# Patient Record
Sex: FEMALE | Race: BLACK OR AFRICAN AMERICAN | NOT HISPANIC OR LATINO | Employment: OTHER | URBAN - METROPOLITAN AREA
[De-identification: names, ages, dates, MRNs, and addresses within clinical notes are randomized per-mention and may not be internally consistent; named-entity substitution may affect disease eponyms.]

---

## 2017-01-03 ENCOUNTER — OFFICE VISIT (OUTPATIENT)
Dept: INTERNAL MEDICINE | Facility: CLINIC | Age: 52
End: 2017-01-03
Payer: MEDICARE

## 2017-01-03 VITALS
DIASTOLIC BLOOD PRESSURE: 80 MMHG | HEART RATE: 89 BPM | HEIGHT: 60 IN | SYSTOLIC BLOOD PRESSURE: 138 MMHG | BODY MASS INDEX: 50.21 KG/M2 | OXYGEN SATURATION: 100 % | WEIGHT: 255.75 LBS

## 2017-01-03 DIAGNOSIS — J44.89 CHRONIC OBSTRUCTIVE ASTHMA: ICD-10-CM

## 2017-01-03 DIAGNOSIS — E11.69 HYPERLIPIDEMIA ASSOCIATED WITH TYPE 2 DIABETES MELLITUS: ICD-10-CM

## 2017-01-03 DIAGNOSIS — E66.01 MORBID OBESITY DUE TO EXCESS CALORIES: ICD-10-CM

## 2017-01-03 DIAGNOSIS — I15.2 HYPERTENSION ASSOCIATED WITH DIABETES: ICD-10-CM

## 2017-01-03 DIAGNOSIS — E78.5 HYPERLIPIDEMIA ASSOCIATED WITH TYPE 2 DIABETES MELLITUS: ICD-10-CM

## 2017-01-03 DIAGNOSIS — E11.42 DIABETIC POLYNEUROPATHY ASSOCIATED WITH TYPE 2 DIABETES MELLITUS: ICD-10-CM

## 2017-01-03 DIAGNOSIS — E11.8 TYPE 2 DIABETES MELLITUS WITH COMPLICATION, WITHOUT LONG-TERM CURRENT USE OF INSULIN: ICD-10-CM

## 2017-01-03 DIAGNOSIS — E83.59 OTHER DISORDERS OF CALCIUM METABOLISM: ICD-10-CM

## 2017-01-03 DIAGNOSIS — E11.59 HYPERTENSION ASSOCIATED WITH DIABETES: ICD-10-CM

## 2017-01-03 PROBLEM — E11.9 TYPE 2 DIABETES MELLITUS, WITHOUT LONG-TERM CURRENT USE OF INSULIN: Status: ACTIVE | Noted: 2017-01-03

## 2017-01-03 PROCEDURE — 99499 UNLISTED E&M SERVICE: CPT | Mod: S$GLB,,, | Performed by: INTERNAL MEDICINE

## 2017-01-03 PROCEDURE — 1159F MED LIST DOCD IN RCRD: CPT | Mod: S$GLB,,, | Performed by: INTERNAL MEDICINE

## 2017-01-03 PROCEDURE — 2022F DILAT RTA XM EVC RTNOPTHY: CPT | Mod: S$GLB,,, | Performed by: INTERNAL MEDICINE

## 2017-01-03 PROCEDURE — 3079F DIAST BP 80-89 MM HG: CPT | Mod: S$GLB,,, | Performed by: INTERNAL MEDICINE

## 2017-01-03 PROCEDURE — 99999 PR PBB SHADOW E&M-EST. PATIENT-LVL III: CPT | Mod: PBBFAC,,, | Performed by: INTERNAL MEDICINE

## 2017-01-03 PROCEDURE — 4010F ACE/ARB THERAPY RXD/TAKEN: CPT | Mod: S$GLB,,, | Performed by: INTERNAL MEDICINE

## 2017-01-03 PROCEDURE — 99204 OFFICE O/P NEW MOD 45 MIN: CPT | Mod: S$GLB,,, | Performed by: INTERNAL MEDICINE

## 2017-01-03 PROCEDURE — 3046F HEMOGLOBIN A1C LEVEL >9.0%: CPT | Mod: S$GLB,,, | Performed by: INTERNAL MEDICINE

## 2017-01-03 PROCEDURE — 3075F SYST BP GE 130 - 139MM HG: CPT | Mod: S$GLB,,, | Performed by: INTERNAL MEDICINE

## 2017-01-03 NOTE — PROGRESS NOTES
Primary Care Provider Appointment    Subjective:      Patient ID: Dai Roper is a 51 y.o. female.    Chief Complaint: Annual Exam; Medication Refill; and Diabetes  Seen at [facility] for ED visits and podiatry appointments. Seen in [facility] ED for asthma. Also frequents [facility] and [facility] EDs. Usually evaluated for asthma exacerbations. Previously followed at [facility], but has not been seen in 1 year.  Wants weight loss surgery.    Asthma is intermittently controlled, patient unaware of PFTs. Diabetes is uncontrolled (per patient) due to asthma meds and steroids. She has been dieting for 1 week.      Social History  Components    Tobacco (-)     Alcohol (-) rare    Ililcit drugs (-)    Sex (-) , previous male partner    Employment (-) Disability for diabetes and asthma       Past Surgical History   Procedure Laterality Date    Hernia repair      Carpal tunnel release         Past Medical History   Diagnosis Date    Asthma     Diabetes mellitus     Hypertension        Family History  Diagnosis    Hypertension- first degree relatives    No diabetes       Review of Systems   Constitutional: Positive for fatigue. Negative for activity change and unexpected weight change.   HENT: Negative for ear discharge.    Eyes: Negative for photophobia and discharge.   Respiratory: Negative for cough, choking and shortness of breath.    Cardiovascular: Negative for chest pain, palpitations and leg swelling.   Gastrointestinal: Negative for constipation and diarrhea.   Endocrine: Negative for polydipsia and polyphagia.   Genitourinary: Negative for frequency and menstrual problem.   Musculoskeletal: Negative for back pain and gait problem.   Skin: Negative for color change.   Neurological: Negative for light-headedness.   Psychiatric/Behavioral: Positive for dysphoric mood and sleep disturbance. Negative for confusion.       Objective:     Visit Vitals    /80    Pulse 89    Ht 5' (1.524 m)    Wt 116 kg (255 lb 11.7  oz)    LMP 02/05/2013    SpO2 100%    BMI 49.94 kg/m2       Physical Exam   Constitutional: She is oriented to person, place, and time. She appears well-developed and well-nourished.   Morbidly obese   HENT:   Head: Normocephalic and atraumatic.   mulitple extracted teeth  Dental caries   Eyes: EOM are normal. Pupils are equal, round, and reactive to light. Right eye exhibits no discharge. Left eye exhibits no discharge.   Neck: Normal range of motion.   Cardiovascular: Normal rate and regular rhythm.  Exam reveals no friction rub.    No murmur heard.  Pulmonary/Chest: Effort normal and breath sounds normal. No respiratory distress. She has no wheezes.   Abdominal: Soft. She exhibits no distension. There is no tenderness.   Obese abdomen   Musculoskeletal: Normal range of motion. She exhibits no tenderness.   Lymphadenopathy:     She has no cervical adenopathy.   Neurological: She is alert and oriented to person, place, and time. She exhibits normal muscle tone. Coordination normal.   Skin: Skin is warm. No erythema.   Psychiatric: She has a normal mood and affect. Her behavior is normal.   Poor eye contact   Vitals reviewed.          Lab Results   Component Value Date    WBC 5.81 01/03/2017    HGB 12.2 01/03/2017    HCT 36.6 (L) 01/03/2017     01/03/2017    ALT 12 07/13/2016    AST 15 07/13/2016     07/13/2016    K 4.0 07/13/2016     07/13/2016    CREATININE 0.7 07/13/2016    BUN 8 07/13/2016    CO2 27 07/13/2016    INR 1.0 09/07/2011       Current Outpatient Prescriptions on File Prior to Visit   Medication Sig Dispense Refill    albuterol (ACCUNEB) 1.25 mg/3 mL Nebu Take 1.25 mg by nebulization every 6 (six) hours as needed.      chlorhexidine (PERIDEX) 0.12 % solution       fluticasone (FLOVENT HFA) 220 mcg/actuation inhaler Inhale 1 puff into the lungs 2 (two) times daily.      hydrocodone-acetaminophen 5-325mg (NORCO) 5-325 mg per tablet       lisinopril (PRINIVIL,ZESTRIL) 20 MG  tablet Take 20 mg by mouth once daily.      metformin (GLUCOPHAGE) 500 MG tablet Take 500 mg by mouth 2 (two) times daily with meals.      methylPREDNISolone (MEDROL DOSEPACK) 4 mg tablet Pack as directed 1 Package 0    ondansetron (ZOFRAN-ODT) 8 MG TbDL Take 1 tablet (8 mg total) by mouth every 8 (eight) hours as needed (for nausea or vomiting). 30 tablet 0    penicillin v potassium (VEETID) 500 MG tablet Take 500 mg by mouth 4 (four) times daily.      tramadol (ULTRAM) 50 mg tablet Take 50 mg by mouth every 6 (six) hours as needed for Pain.      econazole nitrate 1 % cream Apply topically 2 (two) times daily. 85 g 1     No current facility-administered medications on file prior to visit.          Assessment:   51 y.o. female with multiple co-morbid illnesses here to establish care with new PCP and continue work-up of chronic issues notably obesity, DM, asthma.     Plan:     Problem List Items Addressed This Visit        Neuro    Diabetic polyneuropathy associated with type 2 diabetes mellitus     Patient with uncontrolled DM, morbid obesity  · Labs  · DM foot exam at next appt         Relevant Orders    Vitamin D       Pulmonary    Chronic obstructive asthma     Patient with no smoking history, unaware of pulm work-up. Questionable compliance with PETERSON, inhaled steroids. Differential includes asthma vs OHS  · PFT ordered  · Will refer to Pulm (Dr Lopes) once PFT results  · Continue PETERSON, inhaled corticosteroids         Relevant Orders    Complete PFT w/ bronchodilator       Cardiac    Hypertension associated with diabetes     Uncontrolled DM, morbid obesity  · Advised to lose weight  · Health  consult         Relevant Orders    CBC auto differential (Completed)    Comprehensive metabolic panel    Hemoglobin A1c    TSH    Microalbumin/creatinine urine ratio       Endocrine    Type 2 diabetes mellitus with complication, without long-term current use of insulin     Taking oral anti-glycemics, poor  control  · Check A1c today  · Advised to cut back on high-glycemic foods  · Health  consult         Relevant Orders    Comprehensive metabolic panel    Hemoglobin A1c    TSH    Microalbumin/creatinine urine ratio       Fluids/Electrolytes/Nutrition/GI    Morbid obesity due to excess calories     BMI 49, patient with DM, HTN. Patient wanting   · advsied to lose weight  · Decrease trans fats, decrease caloric intake  · Referred to health   · Will consider weight surgery in the future         Relevant Orders    CBC auto differential (Completed)    Hepatitis C antibody    Hyperlipidemia associated with type 2 diabetes mellitus     Patient with uncontrolled DM, morbid obesity, HLD  · Check lipid panel, A1c  · Advised to decrease trans fats  · Continue statin         Relevant Orders    Comprehensive metabolic panel    Hemoglobin A1c    TSH    Microalbumin/creatinine urine ratio    Lipid panel      Other Visit Diagnoses     Other disorders of calcium metabolism         Relevant Orders    Vitamin D          Health Maintenance       Date Due Completion Date    Hepatitis C Screening 1965 ---NOW    Lipid Panel 1965 ---NOW    TETANUS VACCINE 6/30/1981 ---    Pap Smear 6/30/1984 ---    Mammogram 6/30/2003 ---    Colonoscopy 6/30/2013 ---    Influenza Vaccine 8/1/2016 --- TODAY          Return in about 2 days (around 1/5/2017) for review of labs, preventive screening follow-up.    Sumi Corrigan MD/MPH  Internal Medicine  [facility]  [phone]

## 2017-01-03 NOTE — ASSESSMENT & PLAN NOTE
BMI 49, patient with DM, HTN. Patient wanting   · advsied to lose weight  · Decrease trans fats, decrease caloric intake  · Referred to health   · Will consider weight surgery in the future

## 2017-01-03 NOTE — ASSESSMENT & PLAN NOTE
Patient with uncontrolled DM, morbid obesity, HLD  · Check lipid panel, A1c  · Advised to decrease trans fats  · Continue statin

## 2017-01-03 NOTE — ASSESSMENT & PLAN NOTE
Patient with no smoking history, unaware of pulm work-up. Questionable compliance with PETERSON, inhaled steroids. Differential includes asthma vs OHS  · PFT ordered  · Will refer to Pulm (Dr Lopes) once PFT results  · Continue PETERSON, inhaled corticosteroids

## 2017-01-03 NOTE — MR AVS SNAPSHOT
[facility] - Internal Medicine  [address]  [place] [address]  Phone: [phone]  Fax: [phone]                  Dai Roper   1/3/2017 9:00 AM   Office Visit    Description:  Female : 1965   Provider:  Sumi Corrigan MD   Department:  [facility] - Internal Medicine           Reason for Visit     Annual Exam     Medication Refill     Diabetes           Diagnoses this Visit        Comments    Morbid obesity due to excess calories         Chronic obstructive asthma         Type 2 diabetes mellitus with complication, without long-term current use of insulin         Hyperlipidemia associated with type 2 diabetes mellitus         Hypertension associated with diabetes         Diabetic polyneuropathy associated with type 2 diabetes mellitus         Other disorders of calcium metabolism                To Do List           Future Appointments        Provider Department Dept Phone    1/3/2017 10:30 AM LAB, APPOINTMENT [facility] [phone]    2017 10:30 AM Sumi Corrigan MD [facility] [phone]      Goals (5 Years of Data)     None      Follow-Up and Disposition     Return in about 2 days (around 2017) for review of labs, preventive screening follow-up.      [facility]     [facility] Care Line -  Assistance  Registered nurses in the [facility] provide clinical advisement, health education, appointment booking, and other advisory services.  Call for this free service at [phone].             Medications           Message regarding Medications     Verify the changes and/or additions to your medication regime listed below are the same as discussed with your clinician today.  If any of these changes or additions are incorrect, please notify your healthcare provider.             Verify that the below list of medications is an accurate representation of the medications you are currently  taking.  If none reported, the list may be blank. If incorrect, please contact your healthcare provider. Carry this list with you in case of emergency.           Current Medications     albuterol (ACCUNEB) 1.25 mg/3 mL Nebu Take 1.25 mg by nebulization every 6 (six) hours as needed.    chlorhexidine (PERIDEX) 0.12 % solution     fluticasone (FLOVENT HFA) 220 mcg/actuation inhaler Inhale 1 puff into the lungs 2 (two) times daily.    hydrocodone-acetaminophen 5-325mg (NORCO) 5-325 mg per tablet     lisinopril (PRINIVIL,ZESTRIL) 20 MG tablet Take 20 mg by mouth once daily.    metformin (GLUCOPHAGE) 500 MG tablet Take 500 mg by mouth 2 (two) times daily with meals.    methylPREDNISolone (MEDROL DOSEPACK) 4 mg tablet Pack as directed    ondansetron (ZOFRAN-ODT) 8 MG TbDL Take 1 tablet (8 mg total) by mouth every 8 (eight) hours as needed (for nausea or vomiting).    penicillin v potassium (VEETID) 500 MG tablet Take 500 mg by mouth 4 (four) times daily.    tramadol (ULTRAM) 50 mg tablet Take 50 mg by mouth every 6 (six) hours as needed for Pain.    econazole nitrate 1 % cream Apply topically 2 (two) times daily.           Clinical Reference Information           Vital Signs - Last Recorded  Most recent update: 1/3/2017  9:47 AM by Waleska Briones MA    BP Pulse Ht Wt LMP SpO2    138/80 89 5' (1.524 m) 116 kg (255 lb 11.7 oz) 02/05/2013 100%    BMI                49.94 kg/m2          Blood Pressure          Most Recent Value    BP  138/80      Allergies as of 1/3/2017     No Known Allergies      Immunizations Administered on Date of Encounter - 1/3/2017     None      Orders Placed During Today's Visit      Normal Orders This Visit    Microalbumin/creatinine urine ratio     Future Labs/Procedures Expected by Expires    CBC auto differential  1/3/2017 3/4/2018    Comprehensive metabolic panel  1/3/2017 3/4/2018    Hemoglobin A1c  1/3/2017 3/4/2018    Hepatitis C antibody  1/3/2017 3/4/2018    Lipid panel  1/3/2017 3/4/2018     TSH  1/3/2017 3/4/2018    Vitamin D  1/3/2017 3/4/2018    Complete PFT w/ bronchodilator  As directed 1/3/2018      [facility] Sign-Up     Activating your [facility] account is as easy as 1-2-3!     1) Visit [email or web address], select Sign Up Now, enter this activation code and your date of birth, then select Next.  [email or web address]  Expires: 2/17/2017 10:21 AM      2) Create a username and password to use when you visit [facility] in the future and select a security question in case you lose your password and select Next.    3) Enter your e-mail address and click Sign Up!    Additional Information  If you have questions, please e-mail [email or web address] or call [phone] to talk to our [facility] staff. Remember, [facility] is NOT to be used for urgent needs. For medical emergencies, dial 911.

## 2017-01-05 ENCOUNTER — OFFICE VISIT (OUTPATIENT)
Dept: INTERNAL MEDICINE | Facility: CLINIC | Age: 52
End: 2017-01-05
Payer: MEDICARE

## 2017-01-05 VITALS
TEMPERATURE: 98 F | SYSTOLIC BLOOD PRESSURE: 120 MMHG | HEIGHT: 62 IN | RESPIRATION RATE: 20 BRPM | DIASTOLIC BLOOD PRESSURE: 88 MMHG | WEIGHT: 252.63 LBS | OXYGEN SATURATION: 99 % | HEART RATE: 90 BPM | BODY MASS INDEX: 46.49 KG/M2

## 2017-01-05 DIAGNOSIS — E11.42 DIABETIC POLYNEUROPATHY ASSOCIATED WITH TYPE 2 DIABETES MELLITUS: ICD-10-CM

## 2017-01-05 DIAGNOSIS — J44.89 CHRONIC OBSTRUCTIVE ASTHMA: ICD-10-CM

## 2017-01-05 DIAGNOSIS — E11.8 TYPE 2 DIABETES MELLITUS WITH COMPLICATION, WITHOUT LONG-TERM CURRENT USE OF INSULIN: ICD-10-CM

## 2017-01-05 DIAGNOSIS — M79.89 PAIN AND SWELLING OF LEFT LOWER LEG: ICD-10-CM

## 2017-01-05 DIAGNOSIS — E66.01 MORBID OBESITY DUE TO EXCESS CALORIES: ICD-10-CM

## 2017-01-05 DIAGNOSIS — M79.662 PAIN AND SWELLING OF LEFT LOWER LEG: ICD-10-CM

## 2017-01-05 PROCEDURE — 3044F HG A1C LEVEL LT 7.0%: CPT | Mod: S$GLB,,, | Performed by: INTERNAL MEDICINE

## 2017-01-05 PROCEDURE — 99214 OFFICE O/P EST MOD 30 MIN: CPT | Mod: 25,S$GLB,, | Performed by: INTERNAL MEDICINE

## 2017-01-05 PROCEDURE — 99999 PR PBB SHADOW E&M-EST. PATIENT-LVL III: CPT | Mod: PBBFAC,,, | Performed by: INTERNAL MEDICINE

## 2017-01-05 PROCEDURE — 90688 IIV4 VACCINE SPLT 0.5 ML IM: CPT | Mod: S$GLB,,, | Performed by: INTERNAL MEDICINE

## 2017-01-05 PROCEDURE — 3079F DIAST BP 80-89 MM HG: CPT | Mod: S$GLB,,, | Performed by: INTERNAL MEDICINE

## 2017-01-05 PROCEDURE — 99499 UNLISTED E&M SERVICE: CPT | Mod: S$GLB,,, | Performed by: INTERNAL MEDICINE

## 2017-01-05 PROCEDURE — 4010F ACE/ARB THERAPY RXD/TAKEN: CPT | Mod: S$GLB,,, | Performed by: INTERNAL MEDICINE

## 2017-01-05 PROCEDURE — 3074F SYST BP LT 130 MM HG: CPT | Mod: S$GLB,,, | Performed by: INTERNAL MEDICINE

## 2017-01-05 PROCEDURE — G0008 ADMIN INFLUENZA VIRUS VAC: HCPCS | Mod: S$GLB,,, | Performed by: INTERNAL MEDICINE

## 2017-01-05 PROCEDURE — 1159F MED LIST DOCD IN RCRD: CPT | Mod: S$GLB,,, | Performed by: INTERNAL MEDICINE

## 2017-01-05 PROCEDURE — 2022F DILAT RTA XM EVC RTNOPTHY: CPT | Mod: S$GLB,,, | Performed by: INTERNAL MEDICINE

## 2017-01-05 RX ORDER — SIMVASTATIN 40 MG/1
40 TABLET, FILM COATED ORAL NIGHTLY
Qty: 90 TABLET | Refills: 3 | Status: SHIPPED | OUTPATIENT
Start: 2017-01-05 | End: 2017-02-22 | Stop reason: SDUPTHER

## 2017-01-05 RX ORDER — SIMVASTATIN 40 MG/1
40 TABLET, FILM COATED ORAL
COMMUNITY
Start: 2016-07-13 | End: 2017-01-05 | Stop reason: SDUPTHER

## 2017-01-05 RX ORDER — LISINOPRIL AND HYDROCHLOROTHIAZIDE 12.5; 2 MG/1; MG/1
1 TABLET ORAL DAILY
Qty: 90 TABLET | Refills: 3 | Status: SHIPPED | OUTPATIENT
Start: 2017-01-05 | End: 2017-02-22 | Stop reason: SDUPTHER

## 2017-01-05 RX ORDER — HYDROCHLOROTHIAZIDE 12.5 MG/1
12.5 CAPSULE ORAL
COMMUNITY
Start: 2016-07-13 | End: 2017-01-05 | Stop reason: SDUPTHER

## 2017-01-05 RX ORDER — ALBUTEROL SULFATE 1.25 MG/3ML
1.25 SOLUTION RESPIRATORY (INHALATION) EVERY 6 HOURS PRN
Qty: 100 VIAL | Refills: 3 | Status: SHIPPED | OUTPATIENT
Start: 2017-01-05 | End: 2018-04-28 | Stop reason: ALTCHOICE

## 2017-01-05 RX ORDER — GABAPENTIN 300 MG/1
300 CAPSULE ORAL
COMMUNITY
Start: 2016-07-13 | End: 2017-01-05 | Stop reason: SDUPTHER

## 2017-01-05 RX ORDER — GABAPENTIN 300 MG/1
300 CAPSULE ORAL 3 TIMES DAILY
Qty: 90 CAPSULE | Refills: 3 | Status: SHIPPED | OUTPATIENT
Start: 2017-01-05 | End: 2017-11-06 | Stop reason: SDUPTHER

## 2017-01-05 RX ORDER — METFORMIN HYDROCHLORIDE 500 MG/1
500 TABLET ORAL
Qty: 90 TABLET | Refills: 3 | Status: SHIPPED | OUTPATIENT
Start: 2017-01-05 | End: 2017-05-11 | Stop reason: DRUGHIGH

## 2017-01-05 NOTE — ASSESSMENT & PLAN NOTE
Taking oral anti-glycemics, poor control. A1c 6.0 on 1/2017  · Advised to cut back on high-glycemic foods  · Health  consult  · Restart metformin 500mg once daily

## 2017-01-05 NOTE — ASSESSMENT & PLAN NOTE
Patient with uncontrolled DM, morbid obesity. 5/10 abnormal sensation in R foot, 7/10 abnormal in L foot  · A1c 6  · DM foot exam today abnormal  · Continue gabapentin  · F/U podiatry  · Nail debridement

## 2017-01-05 NOTE — ASSESSMENT & PLAN NOTE
"Wells score of 0, chronic pain/swelling since 2011 (with neg DV US). Differential: DM neuropathy, chronic DVT, venous insufficiency, venous stasis  · US of LE to eval for DVT  · Start compression stockings (15.5" each calf) if neg US  · Wean off narcotics  · Tramadol, gabapentin sparingly  · Start PT if neg US  "

## 2017-01-05 NOTE — PATIENT INSTRUCTIONS
TODAY:  - PFT scheduled  - Health  consult  - US of legs  - echocardiogram  - can order compression stockings if LE US normal   - retina camera screening  - flu vaccine    NEXT APPT:  - Physical therapy  - mammogram

## 2017-01-05 NOTE — ASSESSMENT & PLAN NOTE
BMI 49, patient with DM, HTN. Patient wanting   · advsied to lose weight  · Decrease trans fats, decrease caloric intake  · Referred to health  today  · Will consider weight surgery in the future

## 2017-01-05 NOTE — PROGRESS NOTES
Primary Care Provider Appointment    Subjective:      Patient ID: Dai Roper is a 51 y.o. female.    Chief Complaint: Follow-up (2 day follow up )  Patient here today for labs follow-up. Previously took metformin 1g BID, but it made her nauseas. She takes it then spends all day in bed. For the past week she has not taken anything. She reports compliance with simvastatin, lisinopril.    Complains of leg pain from calfs to thighs, worse at night. She elevates the legs to relieve the pain, walking does not affect it. She experiences swelling with the L leg at the end of the day. It is debilitating 1-2 times weekly. She takes 1 norco daily, tramadol twice daily, takes aleve twice daily as needed. She has never done PT or worn compression stockings daily.    Patient continues to be morbidly obese with multiple health complications. Has some insight into her condition, but 7 pound weight gain over the last year.      Social History  Components      Tobacco (-)     Alcohol (-) rare    Ililcit drugs (-)    Sex (-) , previous male partner    Employment (-) Disability for diabetes and asthma         Past Surgical History   Procedure Laterality Date    Hernia repair      Carpal tunnel release         Past Medical History   Diagnosis Date    Asthma     Diabetes mellitus     Hypertension        Review of Systems   Constitutional: Positive for fatigue. Negative for activity change and unexpected weight change.   HENT: Negative for ear discharge.    Eyes: Negative for photophobia and discharge.   Respiratory: Negative for cough, choking and shortness of breath.    Cardiovascular: Positive for leg swelling. Negative for chest pain and palpitations.   Gastrointestinal: Negative for constipation and diarrhea.   Endocrine: Negative for polydipsia and polyphagia.   Genitourinary: Negative for frequency and menstrual problem.   Musculoskeletal: Positive for joint swelling. Negative for back pain and gait problem.  "       Leg pain   Skin: Negative for color change.   Neurological: Negative for light-headedness.   Psychiatric/Behavioral: Positive for dysphoric mood and sleep disturbance. Negative for confusion.       Objective:     Visit Vitals    /88 (BP Location: Right arm, Patient Position: Sitting, BP Method: Manual)    Pulse 90    Temp 98 °F (36.7 °C)    Resp 20    Ht 5' 2" (1.575 m)    Wt 114.6 kg (252 lb 10.4 oz)    LMP 02/05/2013    SpO2 99%    BMI 46.21 kg/m2       Physical Exam   Constitutional: She appears well-developed and well-nourished.   HENT:   Head: Normocephalic and atraumatic.   Eyes: Pupils are equal, round, and reactive to light. Right eye exhibits no discharge. Left eye exhibits no discharge.   Neck: Normal range of motion.   Cardiovascular: Normal rate and regular rhythm.    Pulses:       Dorsalis pedis pulses are 1+ on the right side, and 1+ on the left side.        Posterior tibial pulses are 1+ on the right side, and 1+ on the left side.   mulitple varicose veins in LE  1+ pulses in LE   Pulmonary/Chest: No respiratory distress. She has no wheezes.   Abdominal: Soft. She exhibits no distension. There is no tenderness.   Musculoskeletal: She exhibits edema. She exhibits no tenderness.        Right foot: There is decreased range of motion. There is no deformity.        Left foot: There is decreased range of motion.   Nonpitting edema in LE, notable swelling  TTP bilaterally of legs   Feet:   Right Foot:   Protective Sensation: 10 sites tested. 5 sites sensed.   Skin Integrity: Positive for callus. Negative for ulcer or dry skin.   Left Foot:   Protective Sensation: 10 sites tested. 7 sites sensed.   Skin Integrity: Positive for callus. Negative for ulcer or dry skin.   Lymphadenopathy:     She has no cervical adenopathy.   Neurological: No cranial nerve deficit.   Skin: No erythema.   Psychiatric: She has a normal mood and affect. Her behavior is normal.   Vitals reviewed.      Lab " "Results   Component Value Date    WBC 5.81 01/03/2017    HGB 12.2 01/03/2017    HCT 36.6 (L) 01/03/2017     01/03/2017    CHOL 274 (H) 01/03/2017    TRIG 62 01/03/2017    HDL 67 01/03/2017    ALT 13 01/03/2017    AST 19 01/03/2017     01/03/2017    K 4.4 01/03/2017     01/03/2017    CREATININE 0.8 01/03/2017    BUN 11 01/03/2017    CO2 26 01/03/2017    TSH 1.261 01/03/2017    INR 1.0 09/07/2011    HGBA1C 6.0 01/03/2017         Assessment:   51 y.o. female with multiple co-morbid illnesses here to establish care with new PCP and continue work-up of chronic issues notably DM, leg pain, depression.     Plan:     Problem List Items Addressed This Visit        Neuro    Diabetic polyneuropathy associated with type 2 diabetes mellitus     Patient with uncontrolled DM, morbid obesity. 5/10 abnormal sensation in R foot, 7/10 abnormal in L foot  · A1c 6  · DM foot exam today abnormal  · Continue gabapentin  · F/U podiatry  · Nail debridement         Relevant Orders    Diabetic Eye Screening Photo    Pain and swelling of left lower leg     Wells score of 0, chronic pain/swelling since 2011 (with neg DV US). Differential: DM neuropathy, chronic DVT, venous insufficiency, venous stasis  · US of LE to eval for DVT  · Start compression stockings (15.5" each calf) if neg US  · Wean off narcotics  · Tramadol, gabapentin sparingly  · Start PT if neg US         Relevant Orders    US Lower Extremity Veins Bilateral    2D Echo w/ Color Flow Doppler       Pulmonary    Chronic obstructive asthma     Patient with no smoking history, unaware of pulm work-up. Questionable compliance with PETERSON, inhaled steroids. Differential includes asthma vs OHS  · PFT ordered  · Will refer to Pulm (Dr Lopes) once PFT results  · Continue PETERSON, inhaled corticosteroids            Endocrine    Type 2 diabetes mellitus with complication, without long-term current use of insulin     Taking oral anti-glycemics, poor control. A1c 6.0 on " 1/2017  · Advised to cut back on high-glycemic foods  · Health  consult  · Restart metformin 500mg once daily         Relevant Medications    metformin (GLUCOPHAGE) 500 MG tablet    lisinopril-hydrochlorothiazide (PRINZIDE,ZESTORETIC) 20-12.5 mg per tablet    gabapentin (NEURONTIN) 300 MG capsule    simvastatin (ZOCOR) 40 MG tablet    Other Relevant Orders    Diabetic Eye Screening Photo       Fluids/Electrolytes/Nutrition/GI    Morbid obesity due to excess calories     BMI 49, patient with DM, HTN. Patient wanting   · advsied to lose weight  · Decrease trans fats, decrease caloric intake  · Referred to health  today  · Will consider weight surgery in the future               Health Maintenance       Date Due Completion Date    Foot Exam 6/30/1973 --- TODAY    Eye Exam 6/30/1973 ---TODAY    TETANUS VACCINE 6/30/1981 ---    Pneumococcal PPSV23 (Medium Risk) (1) 6/30/1981 ---    Pap Smear 6/30/1984 ---    Mammogram 6/30/2003 ---    Colonoscopy 6/30/2013 ---    Influenza Vaccine 8/1/2016 --- TODAY    Hemoglobin A1c 7/3/2017 1/3/2017    Lipid Panel 1/3/2018 1/3/2017          Return in about 4 weeks (around 2/2/2017) for established patient follow-up. One hour spent with this patient today, half of that in counseling.    Sumi Corrigan MD/MPH  Internal Medicine  [facility]  [phone]

## 2017-01-05 NOTE — MR AVS SNAPSHOT
[facility] - Internal Medicine  [address]  [place] [address]  Phone: [phone]  Fax: [phone]                  Dai Roper   2017 10:30 AM   Office Visit    Description:  Female : 1965   Provider:  Sumi Corrigan MD   Department:  [facility] - Internal Medicine           Reason for Visit     Follow-up           Diagnoses this Visit        Comments    Pain and swelling of left lower leg         Type 2 diabetes mellitus with complication, without long-term current use of insulin         Morbid obesity due to excess calories         Chronic obstructive asthma         Diabetic polyneuropathy associated with type 2 diabetes mellitus                To Do List           Future Appointments        Provider Department Dept Phone    2017 10:30 AM Sumi Corrigan MD [facility] Internal Medicine [phone]      Goals (5 Years of Data)     None      Follow-Up and Disposition     Return in about 4 weeks (around 2017) for established patient follow-up.       These Medications        Disp Refills Start End    albuterol (ACCUNEB) 1.25 mg/3 mL Nebu 100 vial 3 2017     Take 3 mLs (1.25 mg total) by nebulization every 6 (six) hours as needed. - Nebulization    Pharmacy: [place] [id number] Ph #: [phone]       metformin (GLUCOPHAGE) 500 MG tablet 90 tablet 3 2017     Take 1 tablet (500 mg total) by mouth daily with breakfast. - Oral    Pharmacy: [place] [address] Ph #: [phone]       lisinopril-hydrochlorothiazide (PRINZIDE,ZESTORETIC) 20-12.5 mg per tablet 90 tablet 3 2017    Take 1 tablet by mouth once daily. - Oral    Pharmacy: [place] [address] Ph #: [phone]       gabapentin (NEURONTIN) 300 MG capsule 90 capsule 3 2017     Take 1 capsule (300 mg total) by mouth 3 (three) times daily. - Oral    Pharmacy: [place] [address]  - [address] [place] [address] Ph #: [phone]       simvastatin (ZOCOR) 40 MG tablet 90 tablet 3 1/5/2017     Take 1 tablet (40 mg total) by mouth every evening. - Oral    Pharmacy: [facility] [place] [address] Ph #: [phone]         [facility]     [facility] On Call Nurse Care Line - 24/7 Assistance  Registered nurses in the [facility] provide clinical advisement, health education, appointment booking, and other advisory services.  Call for this free service at [phone].             Medications           Message regarding Medications     Verify the changes and/or additions to your medication regime listed below are the same as discussed with your clinician today.  If any of these changes or additions are incorrect, please notify your healthcare provider.        START taking these NEW medications        Refills    lisinopril-hydrochlorothiazide (PRINZIDE,ZESTORETIC) 20-12.5 mg per tablet 3    Sig: Take 1 tablet by mouth once daily.    Class: Normal    Route: Oral    gabapentin (NEURONTIN) 300 MG capsule 3    Sig: Take 1 capsule (300 mg total) by mouth 3 (three) times daily.    Class: Normal    Route: Oral    simvastatin (ZOCOR) 40 MG tablet 3    Sig: Take 1 tablet (40 mg total) by mouth every evening.    Class: Normal    Route: Oral      CHANGE how you are taking these medications     Start Taking Instead of    albuterol (ACCUNEB) 1.25 mg/3 mL Nebu albuterol (ACCUNEB) 1.25 mg/3 mL Nebu    Dosage:  Take 3 mLs (1.25 mg total) by nebulization every 6 (six) hours as needed. Dosage:  Take 1.25 mg by nebulization every 6 (six) hours as needed.    Reason for Change:  Reorder     metformin (GLUCOPHAGE) 500 MG tablet metformin (GLUCOPHAGE) 500 MG tablet    Dosage:  Take 1 tablet (500 mg total) by mouth daily with breakfast. Dosage:  Take 500 mg by mouth 2 (two) times daily with meals.    Reason for Change:  Reorder       STOP taking these medications     penicillin v  "potassium (VEETID) 500 MG tablet Take 500 mg by mouth 4 (four) times daily.    ondansetron (ZOFRAN-ODT) 8 MG TbDL Take 1 tablet (8 mg total) by mouth every 8 (eight) hours as needed (for nausea or vomiting).    methylPREDNISolone (MEDROL DOSEPACK) 4 mg tablet Pack as directed    lisinopril (PRINIVIL,ZESTRIL) 20 MG tablet Take 20 mg by mouth once daily.    hydrochlorothiazide (MICROZIDE) 12.5 mg capsule Take 12.5 mg by mouth.           Verify that the below list of medications is an accurate representation of the medications you are currently taking.  If none reported, the list may be blank. If incorrect, please contact your healthcare provider. Carry this list with you in case of emergency.           Current Medications     albuterol (ACCUNEB) 1.25 mg/3 mL Nebu Take 3 mLs (1.25 mg total) by nebulization every 6 (six) hours as needed.    chlorhexidine (PERIDEX) 0.12 % solution     econazole nitrate 1 % cream Apply topically 2 (two) times daily.    fluticasone (FLOVENT HFA) 220 mcg/actuation inhaler Inhale 1 puff into the lungs 2 (two) times daily.    gabapentin (NEURONTIN) 300 MG capsule Take 1 capsule (300 mg total) by mouth 3 (three) times daily.    hydrocodone-acetaminophen 5-325mg (NORCO) 5-325 mg per tablet     metformin (GLUCOPHAGE) 500 MG tablet Take 1 tablet (500 mg total) by mouth daily with breakfast.    simvastatin (ZOCOR) 40 MG tablet Take 1 tablet (40 mg total) by mouth every evening.    tramadol (ULTRAM) 50 mg tablet Take 50 mg by mouth every 6 (six) hours as needed for Pain.    lisinopril-hydrochlorothiazide (PRINZIDE,ZESTORETIC) 20-12.5 mg per tablet Take 1 tablet by mouth once daily.           Clinical Reference Information           Vital Signs - Last Recorded  Most recent update: 1/5/2017 10:33 AM by Faith Haskins MA    BP Pulse Temp Resp Ht Wt    120/88 (BP Location: Right arm, Patient Position: Sitting, BP Method: Manual) 90 98 °F (36.7 °C) 20 5' 2" (1.575 m) 114.6 kg (252 lb 10.4 oz)    LMP " SpO2 BMI          02/05/2013 99% 46.21 kg/m2        Blood Pressure          Most Recent Value    BP  120/88      Allergies as of 1/5/2017     No Known Allergies      Immunizations Administered on Date of Encounter - 1/5/2017     Name Date Dose VIS Date Route    influenza - Quadrivalent 1/5/2017 0.5 mL 8/7/2015 Intramuscular      Orders Placed During Today's Visit      Normal Orders This Visit    Influenza - Quadrivalent     Future Labs/Procedures Expected by Expires    US Lower Extremity Veins Bilateral  1/5/2017 1/5/2018    2D Echo w/ Color Flow Doppler  As directed 1/5/2018    Diabetic Eye Screening Photo  As directed 1/5/2018      [facility] Sign-Up     Activating your [facility] account is as easy as 1-2-3!     1) Visit [email or web address], select Sign Up Now, enter this activation code and your date of birth, then select Next.  [email or web address]  Expires: 2/17/2017 10:21 AM      2) Create a username and password to use when you visit [facility] in the future and select a security question in case you lose your password and select Next.    3) Enter your e-mail address and click Sign Up!    Additional Information  If you have questions, please e-mail [email or web address] or call [phone] to talk to our [facility] staff. Remember, [facility] is NOT to be used for urgent needs. For medical emergencies, dial 911.         Instructions    TODAY:  - PFT scheduled  - Health  consult  - US of legs  - echocardiogram  - can order compression stockings if LE US normal   - retina camera screening  - flu vaccine    NEXT APPT:  - Physical therapy  - mammogram

## 2017-01-09 ENCOUNTER — TELEPHONE (OUTPATIENT)
Dept: INTERNAL MEDICINE | Facility: CLINIC | Age: 52
End: 2017-01-09

## 2017-01-09 ENCOUNTER — HOSPITAL ENCOUNTER (OUTPATIENT)
Dept: CARDIOLOGY | Facility: CLINIC | Age: 52
Discharge: HOME | End: 2017-01-09
Payer: MEDICARE

## 2017-01-09 ENCOUNTER — CLINICAL SUPPORT (OUTPATIENT)
Dept: OPTOMETRY | Facility: CLINIC | Age: 52
End: 2017-01-09
Payer: MEDICARE

## 2017-01-09 DIAGNOSIS — E11.8 TYPE 2 DIABETES MELLITUS WITH COMPLICATION, WITHOUT LONG-TERM CURRENT USE OF INSULIN: ICD-10-CM

## 2017-01-09 DIAGNOSIS — M79.662 PAIN AND SWELLING OF LEFT LOWER LEG: ICD-10-CM

## 2017-01-09 DIAGNOSIS — M79.89 PAIN AND SWELLING OF LEFT LOWER LEG: ICD-10-CM

## 2017-01-09 DIAGNOSIS — E11.42 DIABETIC POLYNEUROPATHY ASSOCIATED WITH TYPE 2 DIABETES MELLITUS: ICD-10-CM

## 2017-01-09 LAB
DIASTOLIC DYSFUNCTION: NO
ESTIMATED PA SYSTOLIC PRESSURE: 28.09
RETIRED EF AND QEF - SEE NOTES: 60 (ref 55–65)
TRICUSPID VALVE REGURGITATION: NORMAL

## 2017-01-09 PROCEDURE — 92227 IMG RTA DETCJ/MNTR DS STAFF: CPT | Mod: S$GLB,,, | Performed by: OPHTHALMOLOGY

## 2017-01-09 PROCEDURE — 99999 PR PBB SHADOW E&M-EST. PATIENT-LVL II: CPT | Mod: PBBFAC,,,

## 2017-01-09 PROCEDURE — 93306 TTE W/DOPPLER COMPLETE: CPT | Mod: S$GLB,,, | Performed by: INTERNAL MEDICINE

## 2017-01-09 NOTE — PROGRESS NOTES
HPI     Diabetic Eye Exam    Additional comments: diabetic eye photo            Comments   51 y.o. y/o here for screening for Diabetic Renopathy with non-dilated   fundus photos per Sumi Corrigan MD         Last edited by Nieves Ferrer on 1/9/2017  3:37 PM. (History)            Assessment /Plan     For exam results, see Encounter Report.    Type 2 diabetes mellitus with complication, without long-term current use of insulin  -     Diabetic Eye Screening Photo    Diabetic polyneuropathy associated with type 2 diabetes mellitus  -     Diabetic Eye Screening Photo

## 2017-01-09 NOTE — TELEPHONE ENCOUNTER
----- Message from Sumi Corrigan MD sent at 1/5/2017 12:34 PM CST -----  Dionisio Jones,  Please reach out to this patient for health coaching for the following issues:  - morbid obesity- weight loss plan  - diabetes - med compliance and diet modifications for diabetics   - depression with chronic pain- lifestyle changes to improve her health and mood    ThanksELOINA

## 2017-01-13 ENCOUNTER — TELEPHONE (OUTPATIENT)
Dept: INTERNAL MEDICINE | Facility: CLINIC | Age: 52
End: 2017-01-13

## 2017-01-13 NOTE — TELEPHONE ENCOUNTER
Please let Ms Roper know that her heart ultrasound was normal, and she had no diabetic changes in her eyes.    Thanks,  ELOINA

## 2017-01-26 ENCOUNTER — TELEPHONE (OUTPATIENT)
Dept: OPTOMETRY | Facility: CLINIC | Age: 52
End: 2017-01-26

## 2017-01-26 NOTE — TELEPHONE ENCOUNTER
----- Message from Jason Candelaria MD sent at 1/9/2017  4:30 PM CST -----  No BDR  Thank you for using our diabetic eye screening service

## 2017-02-02 ENCOUNTER — TELEPHONE (OUTPATIENT)
Dept: INTERNAL MEDICINE | Facility: CLINIC | Age: 52
End: 2017-02-02

## 2017-02-02 NOTE — TELEPHONE ENCOUNTER
..Patient referred by Dr. Corrigan for Health coaching (weight loss, lifestyle changes).  Called patient and gave an explanation about Health Coaching program and invited participation.   Patient states she would like to participate.  Set appointment for 2/13/17 at 1000.   Gave direct contact number to call if he/ she has any questions [phone].   Karen Alexander RN  Health

## 2017-02-02 NOTE — TELEPHONE ENCOUNTER
Called left message with her sister.   She took HC number and will have Dai call back.   Karen Alexander RN HC

## 2017-02-03 DIAGNOSIS — Z12.31 OTHER SCREENING MAMMOGRAM: ICD-10-CM

## 2017-02-22 ENCOUNTER — TELEPHONE (OUTPATIENT)
Dept: INTERNAL MEDICINE | Facility: CLINIC | Age: 52
End: 2017-02-22

## 2017-02-22 DIAGNOSIS — E11.8 TYPE 2 DIABETES MELLITUS WITH COMPLICATION, WITHOUT LONG-TERM CURRENT USE OF INSULIN: ICD-10-CM

## 2017-02-22 RX ORDER — SIMVASTATIN 40 MG/1
40 TABLET, FILM COATED ORAL NIGHTLY
Qty: 90 TABLET | Refills: 3 | Status: SHIPPED | OUTPATIENT
Start: 2017-02-22 | End: 2017-05-11 | Stop reason: DRUGHIGH

## 2017-02-22 RX ORDER — LISINOPRIL AND HYDROCHLOROTHIAZIDE 12.5; 2 MG/1; MG/1
1 TABLET ORAL DAILY
Qty: 90 TABLET | Refills: 3 | Status: SHIPPED | OUTPATIENT
Start: 2017-02-22 | End: 2017-04-03 | Stop reason: DRUGHIGH

## 2017-02-22 NOTE — TELEPHONE ENCOUNTER
The lisinopril and zocor were both ordered on 1/5/17. I reordered again.     I will not refill the norco. As we discussed at the last appointment, it is not medically appropriate. If she would like to discuss further, please reschedule an appt.    Thanks,  ELOINA

## 2017-02-22 NOTE — TELEPHONE ENCOUNTER
----- Message from Anu Black sent at 2/22/2017  9:11 AM CST -----  Contact: Patient  Refill    lisinopril-hydrochlorothiazide (PRINZIDE,ZESTORETIC) 20-12.5 mg per tablet   Sig - Route: Take 1 tablet by mouth once daily. - Oral    simvastatin (ZOCOR) 40 MG tablet   Sig - Route: Take 1 tablet (40 mg total) by mouth every evening. - Oral    hydrocodone-acetaminophen 5-325mg (NORCO) 5-325 mg per tablet    90 day fills    [facility] [address] [place] [address] [phone] (Phone)  [phone] (Fax)    The patient says that the pharmacy is telling her they have not received these prescriptions.  You can reach the patient at [phone].  She only has two pills left.     Thanks!

## 2017-02-22 NOTE — TELEPHONE ENCOUNTER
Pt has been advised about medication , Pt has also been rescheduled for 02/24/2017 .      Thanks Dr. Corrigan

## 2017-03-28 ENCOUNTER — HOSPITAL ENCOUNTER (EMERGENCY)
Facility: HOSPITAL | Age: 52
Discharge: HOME | End: 2017-03-28
Attending: SURGERY
Payer: MEDICARE

## 2017-03-28 VITALS
WEIGHT: 225 LBS | OXYGEN SATURATION: 100 % | TEMPERATURE: 97 F | DIASTOLIC BLOOD PRESSURE: 85 MMHG | BODY MASS INDEX: 44.17 KG/M2 | HEART RATE: 89 BPM | RESPIRATION RATE: 20 BRPM | HEIGHT: 60 IN | SYSTOLIC BLOOD PRESSURE: 145 MMHG

## 2017-03-28 DIAGNOSIS — J00 ACUTE NASOPHARYNGITIS: Primary | ICD-10-CM

## 2017-03-28 DIAGNOSIS — R04.0 EPISTAXIS: ICD-10-CM

## 2017-03-28 DIAGNOSIS — R51.9 NONINTRACTABLE HEADACHE, UNSPECIFIED CHRONICITY PATTERN, UNSPECIFIED HEADACHE TYPE: ICD-10-CM

## 2017-03-28 LAB
ALBUMIN SERPL BCP-MCNC: 3.7 G/DL
ALP SERPL-CCNC: 60 U/L
ALT SERPL W/O P-5'-P-CCNC: 13 U/L
ANION GAP SERPL CALC-SCNC: 11 MMOL/L
APTT BLDCRRT: 23.4 SEC
AST SERPL-CCNC: 16 U/L
BASOPHILS # BLD AUTO: 0.02 K/UL
BASOPHILS NFR BLD: 0.3 %
BILIRUB SERPL-MCNC: 0.2 MG/DL
BUN SERPL-MCNC: 6 MG/DL
CALCIUM SERPL-MCNC: 10.3 MG/DL
CHLORIDE SERPL-SCNC: 105 MMOL/L
CK MB SERPL-MCNC: 0.7 NG/ML
CK MB SERPL-RTO: 0.7 %
CK SERPL-CCNC: 98 U/L
CK SERPL-CCNC: 98 U/L
CO2 SERPL-SCNC: 26 MMOL/L
CREAT SERPL-MCNC: 0.8 MG/DL
DIFFERENTIAL METHOD: ABNORMAL
EOSINOPHIL # BLD AUTO: 0.1 K/UL
EOSINOPHIL NFR BLD: 1.5 %
ERYTHROCYTE [DISTWIDTH] IN BLOOD BY AUTOMATED COUNT: 14 %
EST. GFR  (AFRICAN AMERICAN): >60 ML/MIN/1.73 M^2
EST. GFR  (NON AFRICAN AMERICAN): >60 ML/MIN/1.73 M^2
GLUCOSE SERPL-MCNC: 79 MG/DL
HCT VFR BLD AUTO: 37.7 %
HGB BLD-MCNC: 12.3 G/DL
INR PPP: 1
LYMPHOCYTES # BLD AUTO: 3.8 K/UL
LYMPHOCYTES NFR BLD: 50.5 %
MCH RBC QN AUTO: 29.3 PG
MCHC RBC AUTO-ENTMCNC: 32.6 %
MCV RBC AUTO: 90 FL
MONOCYTES # BLD AUTO: 0.4 K/UL
MONOCYTES NFR BLD: 5.4 %
NEUTROPHILS # BLD AUTO: 3.2 K/UL
NEUTROPHILS NFR BLD: 42.3 %
PLATELET # BLD AUTO: 220 K/UL
PMV BLD AUTO: 11.9 FL
POTASSIUM SERPL-SCNC: 4 MMOL/L
PROT SERPL-MCNC: 7.5 G/DL
PROTHROMBIN TIME: 9.9 SEC
RBC # BLD AUTO: 4.2 M/UL
SODIUM SERPL-SCNC: 142 MMOL/L
TROPONIN I SERPL DL<=0.01 NG/ML-MCNC: 0.01 NG/ML
WBC # BLD AUTO: 7.58 K/UL

## 2017-03-28 PROCEDURE — 36415 COLL VENOUS BLD VENIPUNCTURE: CPT

## 2017-03-28 PROCEDURE — 99284 EMERGENCY DEPT VISIT MOD MDM: CPT

## 2017-03-28 PROCEDURE — 93005 ELECTROCARDIOGRAM TRACING: CPT

## 2017-03-28 PROCEDURE — 25000003 PHARM REV CODE 250: Performed by: SURGERY

## 2017-03-28 PROCEDURE — 82553 CREATINE MB FRACTION: CPT

## 2017-03-28 PROCEDURE — 84484 ASSAY OF TROPONIN QUANT: CPT

## 2017-03-28 PROCEDURE — 85730 THROMBOPLASTIN TIME PARTIAL: CPT

## 2017-03-28 PROCEDURE — 85025 COMPLETE CBC W/AUTO DIFF WBC: CPT

## 2017-03-28 PROCEDURE — 80053 COMPREHEN METABOLIC PANEL: CPT

## 2017-03-28 PROCEDURE — 93010 ELECTROCARDIOGRAM REPORT: CPT | Mod: ,,, | Performed by: INTERNAL MEDICINE

## 2017-03-28 PROCEDURE — 85610 PROTHROMBIN TIME: CPT

## 2017-03-28 RX ORDER — AZITHROMYCIN 250 MG/1
500 TABLET, FILM COATED ORAL
Status: COMPLETED | OUTPATIENT
Start: 2017-03-28 | End: 2017-03-28

## 2017-03-28 RX ORDER — TRAMADOL HYDROCHLORIDE 50 MG/1
50 TABLET ORAL EVERY 6 HOURS PRN
Qty: 6 TABLET | Refills: 0 | Status: SHIPPED | OUTPATIENT
Start: 2017-03-28 | End: 2017-04-03

## 2017-03-28 RX ORDER — AZITHROMYCIN 250 MG/1
TABLET, FILM COATED ORAL
Qty: 6 TABLET | Refills: 0 | Status: SHIPPED | OUTPATIENT
Start: 2017-03-28 | End: 2017-04-03 | Stop reason: ALTCHOICE

## 2017-03-28 RX ORDER — HYDROCODONE BITARTRATE AND ACETAMINOPHEN 10; 325 MG/1; MG/1
1 TABLET ORAL
Status: COMPLETED | OUTPATIENT
Start: 2017-03-28 | End: 2017-03-28

## 2017-03-28 RX ADMIN — HYDROCODONE BITARTRATE AND ACETAMINOPHEN 1 TABLET: 10; 325 TABLET ORAL at 08:03

## 2017-03-28 RX ADMIN — AZITHROMYCIN 500 MG: 250 TABLET, FILM COATED ORAL at 08:03

## 2017-03-28 NOTE — ED AVS SNAPSHOT
[facility]  [address] [facility] [address]               Dai Roper   3/28/2017  7:34 PM   ED    Description:  Female : 1965   Department:  [facility]           Your Care was Coordinated By:     Provider Role From To    Kirk Morales MD Attending Provider 17 1928 --      Reason for Visit     Headache     Epistaxis           Diagnoses this Visit        Comments    Acute nasopharyngitis    -  Primary     Nonintractable headache, unspecified chronicity pattern, unspecified headache type         Epistaxis           ED Disposition     ED Disposition Condition Comment    Discharge             To Do List            These Medications        Disp Refills Start End    azithromycin (Z-CANELO) 250 MG tablet 6 tablet 0 3/28/2017     Z-PACK AS DIRECTED    Pharmacy: [facility] [address] [place] [address] Ph #: [phone]       tramadol (ULTRAM) 50 mg tablet 6 tablet 0 3/28/2017 2017    Take 1 tablet (50 mg total) by mouth every 6 (six) hours as needed for Pain. - Oral    Pharmacy: [facility] [address] [place] [address] Ph #: [phone]         [facility] On [facility]     [facility] Nurse Care Line -  Assistance  Registered nurses in the [facility] provide clinical advisement, health education, appointment booking, and other advisory services.  Call for this free service at [phone].             Medications           Message regarding Medications     Verify the changes and/or additions to your medication regime listed below are the same as discussed with your clinician today.  If any of these changes or additions are incorrect, please notify your healthcare provider.        START taking these NEW medications        Refills    azithromycin (Z-CANELO) 250 MG tablet 0    Sig: Z-PACK AS DIRECTED    Class: Normal    tramadol (ULTRAM) 50 mg tablet 0    Sig: Take 1 tablet (50 mg total) by mouth every 6  (six) hours as needed for Pain.    Class: Normal    Route: Oral      These medications were administered today        Dose Freq    hydrocodone-acetaminophen 10-325mg per tablet 1 tablet 1 tablet ED 1 Time    Sig: Take 1 tablet by mouth ED 1 Time.    Class: Normal    Route: Oral    azithromycin tablet 500 mg 500 mg ED 1 Time    Sig: Take 2 tablets (500 mg total) by mouth ED 1 Time.    Class: Normal    Route: Oral           Verify that the below list of medications is an accurate representation of the medications you are currently taking.  If none reported, the list may be blank. If incorrect, please contact your healthcare provider. Carry this list with you in case of emergency.           Current Medications     albuterol (ACCUNEB) 1.25 mg/3 mL Nebu Take 3 mLs (1.25 mg total) by nebulization every 6 (six) hours as needed.    azithromycin (Z-CANELO) 250 MG tablet Z-PACK AS DIRECTED    chlorhexidine (PERIDEX) 0.12 % solution     econazole nitrate 1 % cream Apply topically 2 (two) times daily.    fluticasone (FLOVENT HFA) 220 mcg/actuation inhaler Inhale 1 puff into the lungs 2 (two) times daily.    gabapentin (NEURONTIN) 300 MG capsule Take 1 capsule (300 mg total) by mouth 3 (three) times daily.    hydrocodone-acetaminophen 5-325mg (NORCO) 5-325 mg per tablet     lisinopril-hydrochlorothiazide (PRINZIDE,ZESTORETIC) 20-12.5 mg per tablet Take 1 tablet by mouth once daily.    metformin (GLUCOPHAGE) 500 MG tablet Take 1 tablet (500 mg total) by mouth daily with breakfast.    simvastatin (ZOCOR) 40 MG tablet Take 1 tablet (40 mg total) by mouth every evening.    tramadol (ULTRAM) 50 mg tablet Take 1 tablet (50 mg total) by mouth every 6 (six) hours as needed for Pain.           Clinical Reference Information           Your Vitals Were     BP Pulse Temp Resp Height Weight    145/85 (BP Location: Left arm, Patient Position: Sitting) 89 96.9 °F (36.1 °C) (Oral) 20 5' (1.524 m) 102.1 kg (225 lb)    Last Period SpO2 BMI           02/05/2013 100% 43.94 kg/m2        Allergies as of 3/28/2017     No Known Allergies      Immunizations Administered on Date of Encounter - 3/28/2017     None      ED Micro, Lab, POCT     Start Ordered       Status Ordering Provider    03/28/17 1936 03/28/17 1936  CBC auto differential  STAT      Final result     03/28/17 1936 03/28/17 1936  Comprehensive metabolic panel  STAT      Final result     03/28/17 1936 03/28/17 1936  Protime-INR  STAT      Final result     03/28/17 1936 03/28/17 1936  APTT  STAT      Final result     03/28/17 1936 03/28/17 1936  Troponin I  Now then every 6 hours     Start Status   03/28/17 1936 Final result   03/29/17 0136 Scheduled   03/29/17 0736 Scheduled   03/29/17 1336 Scheduled   03/29/17 1936 Scheduled   03/30/17 0136 Scheduled   03/30/17 0736 Scheduled   03/30/17 1336 Scheduled   03/30/17 1936 Scheduled   03/31/17 0136 Scheduled   03/31/17 0736 Scheduled   03/31/17 1336 Scheduled   03/31/17 1936 Scheduled       Acknowledged     03/28/17 1936 03/28/17 1936  CK  STAT      Final result     03/28/17 1936 03/28/17 1936  CK-MB  STAT      Final result       ED Imaging Orders     Start Ordered       Status Ordering Provider    03/28/17 1936 03/28/17 1936  CT Head Without Contrast  1 time imaging      In process         Discharge Instructions         Nosebleed (Adult)    Bleeding from the nose most commonly occurs because of injury or drying and cracking of the inner lining of the nose. Most nosebleeds are because of dry air or nose-picking. They can occur during a common cold or an allergy attack. They can also occur on a very hot day, or from dry air in the winter.  If the bleeding site is found, it may be cauterized. This means it is treated to cause a blood clot to form. This may be done with a chemical, heat, or electricity. If the bleeding continues after the site is cauterized, or if the site cannot be found, packing may be put in your nose. This is to apply pressure and stop the  bleeding. The packing may be made of gauze or sponge. A small balloon catheter is sometimes used. These must be removed by your doctor. Some types of packing dissolve on their own.  Home care  · If packing was put in your nose, unless told otherwise, do not pull on it or try to remove it yourself. You will be given an appointment to have it removed. You may also have been given antibiotics to prevent a sinus infection. If so, finish all of the medicine.  · Do not blow your nose for 12 hours after the bleeding stops. This will allow a strong blood clot to form. Do not pick your nose. This may restart bleeding.  · Avoid drinking alcohol and hot liquids for the next 2 days. Alcohol or hot liquids in your mouth can dilate blood vessels in your nose. This can cause bleeding to start again.  · Do not take ibuprofen, naproxen, or medicines that contain aspirin. These thin the blood and may cause your nose to bleed. You may take acetaminophen for pain, unless another pain medicine was prescribed.  · If the bleeding starts again, sit up and lean forward to prevent swallowing blood. Pinch your nose tightly on both sides, as shown above, for 10 to 15 minutes. Time yourself. Dont release the pressure on your nose until 10 minutes is up. If bleeding does not stop, continue to pinch your nose and call your healthcare provider or return to this facility.  · If you have a cold, allergies, or dry nasal membranes, lubricate the nasal passages. Apply a small amount of petroleum jelly inside the nose with a cotton swab twice a day (morning and night).  · Avoid overheating your home. This can dry the air and make your condition worse.  · Put a humidifier in the room where you sleep. This will add moisture to the air.  · Use a saline nasal spray to keep nasal passages moist.  · Do not pick your nose. Keep fingernails trimmed to decrease risk of bleeds.  · Do not smoke.  Follow-up care  Follow up with your healthcare provider, or as  advised. Nasal packing should be rechecked or removed within 2 to 3 days.  When to seek medical advice  Call your healthcare provider right away if any of these occur.  · You have another nosebleed that you cannot control  · Dizziness, weakness, or fainting  · You become tired or confused  · Fever of 100.4ºF (38ºC) or higher, or as directed by your healthcare provider  · Headache  · Sinus or facial pain  · Shortness of breath or trouble breathing  Date Last Reviewed: 3/22/2015  © 0549-4854 [employer]. [address] [place] [address]. All rights reserved. This information is not intended as a substitute for professional medical care. Always follow your healthcare professional's instructions.          [facility] Sign-Up     Activating your [facility] account is as easy as 1-2-3!     1) Visit [email or web address], select Sign Up Now, enter this activation code and your date of birth, then select Next.  [email or web address]  Expires: 5/12/2017  8:59 PM      2) Create a username and password to use when you visit [facility] in the future and select a security question in case you lose your password and select Next.    3) Enter your e-mail address and click Sign Up!    Additional Information  If you have questions, please e-mail [email or web address] or call [phone] to talk to our [facility] staff. Remember, [facility] is NOT to be used for urgent needs. For medical emergencies, dial 911.          [facility] complies with applicable Federal civil rights laws and does not discriminate on the basis of race, color, national origin, age, disability, or sex.        Language Assistance Services     ATTENTION: Language assistance services are available, free of charge. Please call [phone].      ATENCIÓN: Si habla toby, tiene a kelly disposición servicios gratuitos de asistencia lingüística. Llame al [phone].     CHÚ Ý: N?u b?n nói Ti?ng Vi?t, có các d?ch v? h? tr? ngôn ng? mi?n  phí dành cho b?christin. G?i s? [phone].

## 2017-03-29 ENCOUNTER — TELEPHONE (OUTPATIENT)
Dept: INTERNAL MEDICINE | Facility: CLINIC | Age: 52
End: 2017-03-29

## 2017-03-29 NOTE — DISCHARGE INSTRUCTIONS
Nosebleed (Adult)    Bleeding from the nose most commonly occurs because of injury or drying and cracking of the inner lining of the nose. Most nosebleeds are because of dry air or nose-picking. They can occur during a common cold or an allergy attack. They can also occur on a very hot day, or from dry air in the winter.  If the bleeding site is found, it may be cauterized. This means it is treated to cause a blood clot to form. This may be done with a chemical, heat, or electricity. If the bleeding continues after the site is cauterized, or if the site cannot be found, packing may be put in your nose. This is to apply pressure and stop the bleeding. The packing may be made of gauze or sponge. A small balloon catheter is sometimes used. These must be removed by your doctor. Some types of packing dissolve on their own.  Home care  · If packing was put in your nose, unless told otherwise, do not pull on it or try to remove it yourself. You will be given an appointment to have it removed. You may also have been given antibiotics to prevent a sinus infection. If so, finish all of the medicine.  · Do not blow your nose for 12 hours after the bleeding stops. This will allow a strong blood clot to form. Do not pick your nose. This may restart bleeding.  · Avoid drinking alcohol and hot liquids for the next 2 days. Alcohol or hot liquids in your mouth can dilate blood vessels in your nose. This can cause bleeding to start again.  · Do not take ibuprofen, naproxen, or medicines that contain aspirin. These thin the blood and may cause your nose to bleed. You may take acetaminophen for pain, unless another pain medicine was prescribed.  · If the bleeding starts again, sit up and lean forward to prevent swallowing blood. Pinch your nose tightly on both sides, as shown above, for 10 to 15 minutes. Time yourself. Dont release the pressure on your nose until 10 minutes is up. If bleeding does not stop, continue to pinch your  nose and call your healthcare provider or return to this facility.  · If you have a cold, allergies, or dry nasal membranes, lubricate the nasal passages. Apply a small amount of petroleum jelly inside the nose with a cotton swab twice a day (morning and night).  · Avoid overheating your home. This can dry the air and make your condition worse.  · Put a humidifier in the room where you sleep. This will add moisture to the air.  · Use a saline nasal spray to keep nasal passages moist.  · Do not pick your nose. Keep fingernails trimmed to decrease risk of bleeds.  · Do not smoke.  Follow-up care  Follow up with your healthcare provider, or as advised. Nasal packing should be rechecked or removed within 2 to 3 days.  When to seek medical advice  Call your healthcare provider right away if any of these occur.  · You have another nosebleed that you cannot control  · Dizziness, weakness, or fainting  · You become tired or confused  · Fever of 100.4ºF (38ºC) or higher, or as directed by your healthcare provider  · Headache  · Sinus or facial pain  · Shortness of breath or trouble breathing  Date Last Reviewed: 3/22/2015  © 3212-5007 [employer]. [address] [place] [address], [place] [address]. All rights reserved. This information is not intended as a substitute for professional medical care. Always follow your healthcare professional's instructions.

## 2017-03-29 NOTE — ED TRIAGE NOTES
Patient presents to the ER with c/o headache and nose bleed.  Patient reports that she suffers with high blood pressure and is worried this is related.  Patient reports symptoms started 2 hours ago.

## 2017-03-29 NOTE — TELEPHONE ENCOUNTER
----- Message from Marina Chase MA sent at 3/29/2017  1:19 PM CDT -----  Contact: self - [phone]  Patient would like to reschedule her appt. Please call. Thanks!

## 2017-03-29 NOTE — ED PROVIDER NOTES
[facility]                                        March 28, 2017                   Chief Complaint  51 y.o. female with Headache and Epistaxis    History of Present Illness  Dai Roper presents to the emergency room with a nosebleed today  Patient states she had a headache with onset of mild bleeding this p.m.  Patient states that she has had a sinus infection with sneezing this week  Patient on exam has clear nasal drainage with nasal mucosa erythema  Patient has no active nose bleeding, denies any nasal trauma on ROS  Pt is alert and oriented ×3 with a GCS 15 and normal motor neuro exam  Patient states the headache started after the nosebleed, normotensive     The history is provided by the patient  Medical history: Asthma, HTN, DM  Surgical history: Carpal tunnel release and hernia repair  No Known Allergies     Review of Systems and Physical Exam     Review of Systems  -- Constitution - no fever, denies fatigue, no weakness, no chills  -- Eyes - no tearing or redness, no visual disturbance  -- Ear, Nose - sneezing, nasal congestion and nosebleed  -- Mouth,Throat - no sore throat, no toothache, normal voice, normal swallowing  -- Respiratory - denies cough and congestion, no shortness of breath, no CURRAN  -- Cardiovascular - denies chest pain, no palpitations, denies claudication  -- Gastrointestinal - denies abdominal pain, nausea, vomiting, or diarrhea  -- Musculoskeletal - denies back pain, negative for myalgias and arthralgias   -- Neurological - headache, denies weakness or seizure; no LOC  -- Skin - denies pallor, rash, or changes in skin. no hives or welts noted    Vital Signs  -- Her blood pressure is 145/85 (abnormal) and her pulse is 89.   -- Her respiration is 20 and oxygen saturation is 100%.      Physical Exam  -- Nursing note and vitals reviewed  -- Constitutional: Appears well-developed and well-nourished  -- Head: Atraumatic. Normocephalic. No obvious abnormality  -- Eyes:  Pupils are equal and reactive to light. Normal conjunctiva and lids  -- Nose: nasal mucosa erythema and edema; clear nasal discharge noted   -- Throat: Mucous membranes moist, pharynx normal, normal tonsils. No lesions   -- Ears: External ears and TM normal bilaterally. Normal hearing and no drainage  -- Neck: Normal range of motion. Neck supple. No masses, trachea midline  -- Cardiac: Normal rate, regular rhythm and normal heart sounds  -- Pulmonary: Normal respiratory effort, breath sounds clear to auscultation  -- Abdominal: Soft, no tenderness. Normal bowel sounds. Normal liver edge  -- Musculoskeletal: Normal range of motion, no effusions. Joints stable   -- Neurological: No focal deficits. Showed good interaction with staff  -- Vascular: Posterior tibial, dorsalis pedis and radial pulses 2+ bilaterally      Emergency Room Course     Treatment and Evaluation  -- The CT of the head performed in the ER today was negative for acute pathology   -- The EKG findings today were without concerning findings from baseline   -- The electrolytes drawn in the ER today were within normal limits   -- The CBC drawn in the ER today was within normal limits   -- The PT, PTT, and INR were within normal limits.    -- The cardiac enzymes were within normal limits   -- PO Zithromax given in today in the ER  -- PO 10 mg Norco given in today in the ER    Diagnosis  -- Acute nasopharyngitis  -- Nonintractable headache  -- Epistaxis     Disposition and Plan  -- Disposition: home  -- Condition: stable  -- Follow-up: Patient to follow up with Sumi Corrigan MD in 1-2 days.  -- I advised the patient that we have found no life threatening condition today  -- At this time, I believe the patient is clinically stable for discharge.   -- The patient acknowledges that close follow up with a MD is required   -- Patient agrees to comply with all instruction and direction given in the ER    This note is dictated on Dragon Natural Speaking  word recognition program.  There are word recognition mistakes that are occasionally missed on review.    ]       Kirk Morales MD  03/28/17 2108

## 2017-04-03 ENCOUNTER — OFFICE VISIT (OUTPATIENT)
Dept: INTERNAL MEDICINE | Facility: CLINIC | Age: 52
End: 2017-04-03
Payer: MEDICARE

## 2017-04-03 VITALS
DIASTOLIC BLOOD PRESSURE: 68 MMHG | HEART RATE: 85 BPM | OXYGEN SATURATION: 99 % | SYSTOLIC BLOOD PRESSURE: 112 MMHG | HEIGHT: 61 IN | WEIGHT: 235 LBS | BODY MASS INDEX: 44.37 KG/M2

## 2017-04-03 DIAGNOSIS — I15.2 HYPERTENSION ASSOCIATED WITH DIABETES: ICD-10-CM

## 2017-04-03 DIAGNOSIS — Z12.31 ENCOUNTER FOR SCREENING MAMMOGRAM FOR MALIGNANT NEOPLASM OF BREAST: ICD-10-CM

## 2017-04-03 DIAGNOSIS — E11.8 TYPE 2 DIABETES MELLITUS WITH COMPLICATION, WITHOUT LONG-TERM CURRENT USE OF INSULIN: ICD-10-CM

## 2017-04-03 DIAGNOSIS — E66.01 MORBID OBESITY DUE TO EXCESS CALORIES: ICD-10-CM

## 2017-04-03 DIAGNOSIS — E11.42 DIABETIC POLYNEUROPATHY ASSOCIATED WITH TYPE 2 DIABETES MELLITUS: ICD-10-CM

## 2017-04-03 DIAGNOSIS — E11.69 HYPERLIPIDEMIA ASSOCIATED WITH TYPE 2 DIABETES MELLITUS: ICD-10-CM

## 2017-04-03 DIAGNOSIS — J44.89 CHRONIC OBSTRUCTIVE ASTHMA: ICD-10-CM

## 2017-04-03 DIAGNOSIS — E78.5 HYPERLIPIDEMIA ASSOCIATED WITH TYPE 2 DIABETES MELLITUS: ICD-10-CM

## 2017-04-03 DIAGNOSIS — E11.59 HYPERTENSION ASSOCIATED WITH DIABETES: ICD-10-CM

## 2017-04-03 DIAGNOSIS — E55.9 VITAMIN D DEFICIENCY: ICD-10-CM

## 2017-04-03 PROCEDURE — 99999 PR PBB SHADOW E&M-EST. PATIENT-LVL IV: CPT | Mod: PBBFAC,,, | Performed by: INTERNAL MEDICINE

## 2017-04-03 PROCEDURE — 1160F RVW MEDS BY RX/DR IN RCRD: CPT | Mod: S$GLB,,, | Performed by: INTERNAL MEDICINE

## 2017-04-03 PROCEDURE — 3044F HG A1C LEVEL LT 7.0%: CPT | Mod: S$GLB,,, | Performed by: INTERNAL MEDICINE

## 2017-04-03 PROCEDURE — 4010F ACE/ARB THERAPY RXD/TAKEN: CPT | Mod: S$GLB,,, | Performed by: INTERNAL MEDICINE

## 2017-04-03 PROCEDURE — 3078F DIAST BP <80 MM HG: CPT | Mod: S$GLB,,, | Performed by: INTERNAL MEDICINE

## 2017-04-03 PROCEDURE — 99215 OFFICE O/P EST HI 40 MIN: CPT | Mod: 25,S$GLB,, | Performed by: INTERNAL MEDICINE

## 2017-04-03 PROCEDURE — 3074F SYST BP LT 130 MM HG: CPT | Mod: S$GLB,,, | Performed by: INTERNAL MEDICINE

## 2017-04-03 PROCEDURE — G0009 ADMIN PNEUMOCOCCAL VACCINE: HCPCS | Mod: S$GLB,,, | Performed by: INTERNAL MEDICINE

## 2017-04-03 PROCEDURE — 90732 PPSV23 VACC 2 YRS+ SUBQ/IM: CPT | Mod: S$GLB,,, | Performed by: INTERNAL MEDICINE

## 2017-04-03 PROCEDURE — 99499 UNLISTED E&M SERVICE: CPT | Mod: S$PBB,,, | Performed by: INTERNAL MEDICINE

## 2017-04-03 RX ORDER — ERGOCALCIFEROL 1.25 MG/1
50000 CAPSULE ORAL
Qty: 12 CAPSULE | Refills: 0 | Status: SHIPPED | OUTPATIENT
Start: 2017-04-03 | End: 2017-05-11

## 2017-04-03 RX ORDER — LISINOPRIL AND HYDROCHLOROTHIAZIDE 12.5; 2 MG/1; MG/1
1 TABLET ORAL DAILY
Qty: 90 TABLET | Refills: 3
Start: 2017-04-03 | End: 2017-05-11 | Stop reason: DRUGHIGH

## 2017-04-03 NOTE — PROGRESS NOTES
"Primary Care Provider Appointment    Subjective:      Patient ID: Dai Roper is a 51 y.o. female.    Chief Complaint: Follow-up; Obesity; and Diabetes  Patient self-presented to [facility] in [place] for headache and nose bleed on 3/28/17. It was 148/95. She does not report any dietary changes at that time. She was given norco and zithromax (z-pack) for acute nasopharyngitis and possible sinusitis. No evidence of epistaxis on PE. She did not tolerate the norco from ED, nor the tramadol given by PCP. She completed the zithromax 6-day course.    She is feeling better today. Here for pre-op clearance for dental work.     She has been walking 1 hour daily in her neighborhood, usually twice daily and by herself.    Patient is confused about whether she is taking her ACE or HCTZ (or both). She was started on a combo pill in 1/2017.      Past Surgical History:   Procedure Laterality Date    CARPAL TUNNEL RELEASE      HERNIA REPAIR         Past Medical History:   Diagnosis Date    Asthma     Diabetes mellitus     Hypertension        Review of Systems   Constitutional: Positive for appetite change. Negative for chills, fever and unexpected weight change.        Intentional weight loss   HENT: Positive for congestion, dental problem, nosebleeds, postnasal drip, rhinorrhea and sinus pressure.    Gastrointestinal: Negative for constipation, diarrhea and nausea.   Musculoskeletal: Negative for back pain, gait problem and myalgias.   Neurological: Positive for dizziness, light-headedness and headaches. Negative for speech difficulty.   Psychiatric/Behavioral: Negative for behavioral problems, confusion and decreased concentration. The patient is not nervous/anxious.        Objective:   /68 (BP Location: Left arm, Patient Position: Sitting, BP Method: Manual)  Pulse 85  Ht 5' 1" (1.549 m)  Wt 106.6 kg (235 lb 0.2 oz)  LMP 02/05/2013  SpO2 99%  BMI 44.4 kg/m2    Physical Exam   Constitutional: She is oriented " to person, place, and time. She appears well-developed and well-nourished.   HENT:   Head: Normocephalic and atraumatic.   Neck: Normal range of motion.   Neurological: She is alert and oriented to person, place, and time.   Psychiatric: She has a normal mood and affect. Her behavior is normal. Thought content normal.   Vitals reviewed.      Lab Results   Component Value Date    WBC 7.58 03/28/2017    HGB 12.3 03/28/2017    HCT 37.7 03/28/2017     03/28/2017    CHOL 274 (H) 01/03/2017    TRIG 62 01/03/2017    HDL 67 01/03/2017    ALT 13 03/28/2017    AST 16 03/28/2017     03/28/2017    K 4.0 03/28/2017     03/28/2017    CREATININE 0.8 03/28/2017    BUN 6 03/28/2017    CO2 26 03/28/2017    TSH 1.261 01/03/2017    INR 1.0 03/28/2017    HGBA1C 6.0 01/03/2017         Assessment:   51 y.o. female with multiple co-morbid illnesses here to continue work-up of chronic issues notably dental issues, headache/sinusitis, obesity.     Plan:     Problem List Items Addressed This Visit        Other    Morbid obesity due to excess calories     BMI 44 in 4/2017 (was 49 in 1/2017), patient with DM, HTN. Losing weight through exercise and diet changes through health   · Decrease trans fats, decrease caloric intake  · Continue health  referral  · Start medical fitness program   · Silver sneakers membership at [place]         Relevant Medications    ergocalciferol (ERGOCALCIFEROL) 50,000 unit Cap    Other Relevant Orders    Ambulatory Referral to Medical Fitness    [facility] ASSIGN QUESTIONNAIRE SERIES (Completed)    [facility] Patient Entered [facility]    Mammo Digital Screening Bilateral With CAD    Chronic obstructive asthma     Patient with no smoking history, unaware of pulm work-up. Questionable compliance with PETERSON, inhaled steroids. Differential includes asthma vs [facility]. Symptoms improved with weight loss  · No-show to previous PFT  · Continue PETERSON, inhaled corticosteroids  · Taking as  needed  · Advised to continue losing weight         Type 2 diabetes mellitus with complication, without long-term current use of insulin     A1c 6 in 1/2017, compliant metformin 500mg daily  · Advised to cut back on high-glycemic foods  · Health  consult  · Continue metformin 500mg once daily  · Exercise  · Medical fitness consult, silver sneakers membership  · Diabetic eye screening photo 1/2017  · No DR         Relevant Medications    lisinopril-hydrochlorothiazide (PRINZIDE,ZESTORETIC) 20-12.5 mg per tablet    Other Relevant Orders    Hemoglobin A1c    Lipid panel    Basic metabolic panel    Pneumococcal Polysaccharide Vaccine (23 Valent) (SQ/IM)    Hyperlipidemia associated with type 2 diabetes mellitus     A1c 6 in 1/2017, compliant metformin 500mg daily,  in 1/2017  · Advised to decrease trans fats  · Continue statin         Relevant Orders    Hemoglobin A1c    Lipid panel    Basic metabolic panel    Hypertension associated with diabetes     A1c 6 in 1/2017, /68 today, but elevated at 145/85 on 3/28Uncontrolled DM, morbid obesity  · Continue weight loss  · F/U Health   · Start medical fitness program with silver sneakers  · Monitor home BP  · No medication change today due to recent variation in pressures         Relevant Orders    Hemoglobin A1c    Basic metabolic panel    Pneumococcal Polysaccharide Vaccine (23 Valent) (SQ/IM)    Diabetic polyneuropathy associated with type 2 diabetes mellitus     A1c 6 in 1/2017, compliant with metformin 500mg daily, taking gabapentin 900mg TID  · DM foot exam abnormal  · F/U podiatry  · Continue gabapentin 900mg TID         Relevant Orders    Hemoglobin A1c    Pneumococcal Polysaccharide Vaccine (23 Valent) (SQ/IM)    Uncontrolled secondary diabetes mellitus with stage 2 CKD (GFR 60-89)     GFR >60, A1c 6 in 1/2017  · Avoid nephrotoxic meds  · Advised to switch from ibuprofen to tylenol  · Continue DM and HTN control         Vitamin D deficiency      Vit D level of 10 in 1/2017  · Start vit D 50,000U weekly X 12 weeks  · Monitor clinically         Relevant Medications    ergocalciferol (ERGOCALCIFEROL) 50,000 unit Cap      Other Visit Diagnoses     Encounter for screening mammogram for malignant neoplasm of breast         Relevant Orders    Mammo Digital Screening Bilateral With CAD          Health Maintenance       Date Due Completion Date    Pneumococcal PPSV23 (Medium Risk) (1) 6/30/1981 ---TODAY    Pap Smear 6/30/1984 ---NEXT APPT    Mammogram 6/30/2003 ---TODAY    Colonoscopy 6/30/2013 ---NONE    TETANUS VACCINE 1/6/2016 1/6/2006    Hemoglobin A1c 7/3/2017 1/3/2017    Lipid Panel 1/3/2018 1/3/2017    Foot Exam 1/5/2018 1/5/2017    Eye Exam 1/9/2018 1/9/2017          Return in about 3 months (around 7/3/2017). . One hour spent with this patient today, half of that in counseling.    Sumi Corrigan MD/MPH  Internal Medicine  [facility]  [phone]

## 2017-04-03 NOTE — ASSESSMENT & PLAN NOTE
A1c 6 in 1/2017, /68 today, but elevated at 145/85 on 3/28Uncontrolled DM, morbid obesity  · Continue weight loss  · F/U Health   · Start medical fitness program with silver sneakers  · Monitor home BP  · No medication change today due to recent variation in pressures

## 2017-04-03 NOTE — ASSESSMENT & PLAN NOTE
A1c 6 in 1/2017, compliant with metformin 500mg daily, taking gabapentin 900mg TID  · DM foot exam abnormal  · F/U podiatry  · Continue gabapentin 900mg TID

## 2017-04-03 NOTE — ASSESSMENT & PLAN NOTE
BMI 44 in 4/2017 (was 49 in 1/2017), patient with DM, HTN. Losing weight through exercise and diet changes through health   · Decrease trans fats, decrease caloric intake  · Continue health  referral  · Start medical fitness program   · Silver sneakers membership at [place]

## 2017-04-03 NOTE — MR AVS SNAPSHOT
[facility]  [address]  [place] [address]  Phone: [phone]  Fax: [phone]                  Dai Roper   4/3/2017 8:00 AM   Office Visit    Description:  Female : 1965   Provider:  Sumi Corrigan MD   Department:  [facility]           Reason for Visit     Follow-up     Obesity     Diabetes           Diagnoses this Visit        Comments    Morbid obesity due to excess calories         Type 2 diabetes mellitus with complication, without long-term current use of insulin         Hyperlipidemia associated with type 2 diabetes mellitus         Hypertension associated with diabetes         Diabetic polyneuropathy associated with type 2 diabetes mellitus         Chronic obstructive asthma         Encounter for screening mammogram for malignant neoplasm of breast         Uncontrolled secondary diabetes mellitus with stage 2 CKD (GFR 60-89)         Vitamin D deficiency                To Do List           Future Appointments        Provider Department Dept Phone    2017 8:00 AM [facility] Internal Medicine [phone]      Goals (5 Years of Data)     None      Follow-Up and Disposition     Return in about 3 months (around 7/3/2017).       These Medications        Disp Refills Start End    lisinopril-hydrochlorothiazide (PRINZIDE,ZESTORETIC) 20-12.5 mg per tablet 90 tablet 3 4/3/2017 4/3/2018    Take 1 tablet by mouth once daily. - Oral    Pharmacy: [facility] [address] [place] [address] Ph #: [phone]       ergocalciferol (ERGOCALCIFEROL) 50,000 unit Cap 12 capsule 0 4/3/2017     Take 1 capsule (50,000 Units total) by mouth every 7 days. - Oral    Pharmacy: [facility] [address] [place] [address] Ph #: [phone]         [facility]     [facility] Nurse Care Line -  Assistance  Unless otherwise directed by your provider, please contact [facility], our  nurse care line that is available for 24/7 assistance.     Registered nurses in the [facility] provide: appointment scheduling, clinical advisement, health education, and other advisory services.  Call: [phone] (toll free)               Medications           Message regarding Medications     Verify the changes and/or additions to your medication regime listed below are the same as discussed with your clinician today.  If any of these changes or additions are incorrect, please notify your healthcare provider.        START taking these NEW medications        Refills    ergocalciferol (ERGOCALCIFEROL) 50,000 unit Cap 0    Sig: Take 1 capsule (50,000 Units total) by mouth every 7 days.    Class: Normal    Route: Oral      STOP taking these medications     hydrocodone-acetaminophen 5-325mg (NORCO) 5-325 mg per tablet     azithromycin (Z-CANELO) 250 MG tablet Z-PACK AS DIRECTED    tramadol (ULTRAM) 50 mg tablet Take 1 tablet (50 mg total) by mouth every 6 (six) hours as needed for Pain.           Verify that the below list of medications is an accurate representation of the medications you are currently taking.  If none reported, the list may be blank. If incorrect, please contact your healthcare provider. Carry this list with you in case of emergency.           Current Medications     albuterol (ACCUNEB) 1.25 mg/3 mL Nebu Take 3 mLs (1.25 mg total) by nebulization every 6 (six) hours as needed.    chlorhexidine (PERIDEX) 0.12 % solution     fluticasone (FLOVENT HFA) 220 mcg/actuation inhaler Inhale 1 puff into the lungs 2 (two) times daily.    gabapentin (NEURONTIN) 300 MG capsule Take 1 capsule (300 mg total) by mouth 3 (three) times daily.    lisinopril-hydrochlorothiazide (PRINZIDE,ZESTORETIC) 20-12.5 mg per tablet Take 1 tablet by mouth once daily.    metformin (GLUCOPHAGE) 500 MG tablet Take 1 tablet (500 mg total) by mouth daily with breakfast.    simvastatin (ZOCOR) 40 MG tablet Take 1 tablet (40  "mg total) by mouth every evening.    econazole nitrate 1 % cream Apply topically 2 (two) times daily.    ergocalciferol (ERGOCALCIFEROL) 50,000 unit Cap Take 1 capsule (50,000 Units total) by mouth every 7 days.           Clinical Reference Information           Your Vitals Were     BP Pulse Height Weight Last Period SpO2    112/68 (BP Location: Left arm, Patient Position: Sitting, BP Method: Manual) 85 5' 1" (1.549 m) 106.6 kg (235 lb 0.2 oz) 02/05/2013 99%    BMI                44.4 kg/m2          Blood Pressure          Most Recent Value    BP  112/68      Allergies as of 4/3/2017     No Known Allergies      Immunizations Administered on Date of Encounter - 4/3/2017     Name Date Dose VIS Date Route    Pneumococcal Polysaccharide - 23 Valent 4/3/2017 0.5 mL 4/24/2015 Intramuscular      Orders Placed During Today's Visit      Normal Orders This Visit    Ambulatory Referral to Medical Fitness     [facility] Patient Entered [facility]     [facility] ASSIGN QUESTIONNAIRE SERIES     Pneumococcal Polysaccharide Vaccine (23 Valent) (SQ/IM)     Future Labs/Procedures Expected by Expires    Basic metabolic panel  4/3/2017 6/2/2018    Hemoglobin A1c  4/3/2017 6/2/2018    Lipid panel  4/3/2017 6/2/2018    Mammo Digital Screening Bilateral With CAD  4/3/2017 6/3/2018      [facility] Sign-Up     Activating your [facility] account is as easy as 1-2-3!     1) Visit [email or web address], select Sign Up Now, enter this activation code and your date of birth, then select Next.  [email or web address]  Expires: 5/12/2017  8:59 PM      2) Create a username and password to use when you visit [facility] in the future and select a security question in case you lose your password and select Next.    3) Enter your e-mail address and click Sign Up!    Additional Information  If you have questions, please e-mail [email or web address] or call [phone] to talk to our [facility] staff. Remember, [facility] is NOT to be used for urgent needs. For " medical emergencies, dial 911.         Instructions    TODAY:  - order loratadine from [employer]  - order blood pressure cuff from [employer] (call us with readings)  - pre-op clearance for dental surgery   + meds and medical conditions printed in AVS  - PNAvax 23 valent  - mammogram ordered (perform in 2 weeks)  - labs today  - medical fitness consult at [place] (someone will call you)  - silver sneakers gym membership at [place]       Language Assistance Services     ATTENTION: Language assistance services are available, free of charge. Please call [phone].      ATENCIÓN: Si habla español, tiene a kelly disposición servicios gratuitos de asistencia lingüística. Llame al [phone].     CHÚ Ý: N?u b?n nói Ti?ng Vi?t, có các d?ch v? h? tr? ngôn ng? mi?n phí dành cho b?n. G?i s? [phone].         [facility] complies with applicable Federal civil rights laws and does not discriminate on the basis of race, color, national origin, age, disability, or sex.

## 2017-04-03 NOTE — PATIENT INSTRUCTIONS
TODAY:  - order loratadine from [employer]  - order blood pressure cuff from [employer] (call us with readings)  - pre-op clearance for dental surgery   + meds and medical conditions printed in AVS  - PNAvax 23 valent  - mammogram ordered (perform in 2 weeks)  - labs today  - medical fitness consult at [place] (someone will call you)  - silver sneakers gym membership at [place]

## 2017-04-03 NOTE — ASSESSMENT & PLAN NOTE
Patient with no smoking history, unaware of pulm work-up. Questionable compliance with PETERSON, inhaled steroids. Differential includes asthma vs [facility]. Symptoms improved with weight loss  · No-show to previous PFT  · Continue PETERSON, inhaled corticosteroids  · Taking as needed  · Advised to continue losing weight

## 2017-04-03 NOTE — ASSESSMENT & PLAN NOTE
A1c 6 in 1/2017, compliant metformin 500mg daily,  in 1/2017  · Advised to decrease trans fats  · Continue statin

## 2017-04-03 NOTE — ASSESSMENT & PLAN NOTE
A1c 6 in 1/2017, compliant metformin 500mg daily  · Advised to cut back on high-glycemic foods  · Health  consult  · Continue metformin 500mg once daily  · Exercise  · Medical fitness consult, silver sneakers membership  · Diabetic eye screening photo 1/2017  · No DR

## 2017-04-03 NOTE — ASSESSMENT & PLAN NOTE
GFR >60, A1c 6 in 1/2017  · Avoid nephrotoxic meds  · Advised to switch from ibuprofen to tylenol  · Continue DM and HTN control

## 2017-04-12 ENCOUNTER — OFFICE VISIT (OUTPATIENT)
Dept: INTERNAL MEDICINE | Facility: CLINIC | Age: 52
End: 2017-04-12
Payer: MEDICARE

## 2017-04-12 ENCOUNTER — HOSPITAL ENCOUNTER (OUTPATIENT)
Dept: RADIOLOGY | Facility: HOSPITAL | Age: 52
Discharge: HOME | End: 2017-04-12
Attending: INTERNAL MEDICINE
Payer: MEDICARE

## 2017-04-12 ENCOUNTER — LAB VISIT (OUTPATIENT)
Dept: LAB | Facility: HOSPITAL | Age: 52
End: 2017-04-12
Attending: INTERNAL MEDICINE
Payer: MEDICARE

## 2017-04-12 VITALS
WEIGHT: 241.38 LBS | TEMPERATURE: 98 F | HEART RATE: 76 BPM | BODY MASS INDEX: 45.57 KG/M2 | SYSTOLIC BLOOD PRESSURE: 133 MMHG | DIASTOLIC BLOOD PRESSURE: 82 MMHG | HEIGHT: 61 IN

## 2017-04-12 DIAGNOSIS — I15.2 HYPERTENSION ASSOCIATED WITH DIABETES: ICD-10-CM

## 2017-04-12 DIAGNOSIS — M79.662 PAIN AND SWELLING OF LEFT LOWER LEG: ICD-10-CM

## 2017-04-12 DIAGNOSIS — E66.01 MORBID OBESITY DUE TO EXCESS CALORIES: ICD-10-CM

## 2017-04-12 DIAGNOSIS — Z12.31 ENCOUNTER FOR SCREENING MAMMOGRAM FOR MALIGNANT NEOPLASM OF BREAST: ICD-10-CM

## 2017-04-12 DIAGNOSIS — E11.59 HYPERTENSION ASSOCIATED WITH DIABETES: ICD-10-CM

## 2017-04-12 DIAGNOSIS — E11.69 HYPERLIPIDEMIA ASSOCIATED WITH TYPE 2 DIABETES MELLITUS: ICD-10-CM

## 2017-04-12 DIAGNOSIS — M79.89 PAIN AND SWELLING OF LEFT LOWER LEG: ICD-10-CM

## 2017-04-12 DIAGNOSIS — E55.9 VITAMIN D DEFICIENCY: ICD-10-CM

## 2017-04-12 DIAGNOSIS — E11.42 DIABETIC POLYNEUROPATHY ASSOCIATED WITH TYPE 2 DIABETES MELLITUS: ICD-10-CM

## 2017-04-12 DIAGNOSIS — E78.5 HYPERLIPIDEMIA ASSOCIATED WITH TYPE 2 DIABETES MELLITUS: ICD-10-CM

## 2017-04-12 DIAGNOSIS — E11.42 DIABETIC POLYNEUROPATHY ASSOCIATED WITH TYPE 2 DIABETES MELLITUS: Primary | ICD-10-CM

## 2017-04-12 DIAGNOSIS — E11.8 TYPE 2 DIABETES MELLITUS WITH COMPLICATION, WITHOUT LONG-TERM CURRENT USE OF INSULIN: ICD-10-CM

## 2017-04-12 LAB
ANION GAP SERPL CALC-SCNC: 7 MMOL/L
BUN SERPL-MCNC: 9 MG/DL
CALCIUM SERPL-MCNC: 9.7 MG/DL
CHLORIDE SERPL-SCNC: 103 MMOL/L
CHOLEST/HDLC SERPL: 3.2 {RATIO}
CO2 SERPL-SCNC: 30 MMOL/L
CREAT SERPL-MCNC: 0.8 MG/DL
EST. GFR  (AFRICAN AMERICAN): >60 ML/MIN/1.73 M^2
EST. GFR  (NON AFRICAN AMERICAN): >60 ML/MIN/1.73 M^2
GLUCOSE SERPL-MCNC: 91 MG/DL
HDL/CHOLESTEROL RATIO: 31.5 %
HDLC SERPL-MCNC: 254 MG/DL
HDLC SERPL-MCNC: 80 MG/DL
LDLC SERPL CALC-MCNC: 157.2 MG/DL
NONHDLC SERPL-MCNC: 174 MG/DL
POTASSIUM SERPL-SCNC: 4.5 MMOL/L
SODIUM SERPL-SCNC: 140 MMOL/L
TRIGL SERPL-MCNC: 84 MG/DL

## 2017-04-12 PROCEDURE — 77067 SCR MAMMO BI INCL CAD: CPT | Mod: 26,,, | Performed by: RADIOLOGY

## 2017-04-12 PROCEDURE — 99499 UNLISTED E&M SERVICE: CPT | Mod: S$PBB,,, | Performed by: INTERNAL MEDICINE

## 2017-04-12 PROCEDURE — 4010F ACE/ARB THERAPY RXD/TAKEN: CPT | Mod: S$GLB,,, | Performed by: INTERNAL MEDICINE

## 2017-04-12 PROCEDURE — 99999 PR PBB SHADOW E&M-EST. PATIENT-LVL III: CPT | Mod: PBBFAC,,, | Performed by: INTERNAL MEDICINE

## 2017-04-12 PROCEDURE — 1160F RVW MEDS BY RX/DR IN RCRD: CPT | Mod: S$GLB,,, | Performed by: INTERNAL MEDICINE

## 2017-04-12 PROCEDURE — 3075F SYST BP GE 130 - 139MM HG: CPT | Mod: S$GLB,,, | Performed by: INTERNAL MEDICINE

## 2017-04-12 PROCEDURE — 99213 OFFICE O/P EST LOW 20 MIN: CPT | Mod: S$GLB,,, | Performed by: INTERNAL MEDICINE

## 2017-04-12 PROCEDURE — 3079F DIAST BP 80-89 MM HG: CPT | Mod: S$GLB,,, | Performed by: INTERNAL MEDICINE

## 2017-04-12 PROCEDURE — 3044F HG A1C LEVEL LT 7.0%: CPT | Mod: S$GLB,,, | Performed by: INTERNAL MEDICINE

## 2017-04-12 NOTE — PROGRESS NOTES
Subjective:       Patient ID: Dai Roper is a 51 y.o. female.    Chief Complaint: Anxiety and Leg Pain (left)    HPI the patient is a 51-year-old female who comes in due to complaints of side effects from high-dose vitamin D, 50,000 units per week.  The patient was found to be vitamin D deficient at her last visit and has taken 2 of the vitamin D doses.  She describes numbness in her left arm, anxiety, discoloration in her legs, and swelling since beginning the medication.  She is not wish to continue medication.  Review of Systems    Objective:      Physical Exam   Musculoskeletal: She exhibits edema.   Trace.  Edema bilaterally.  Negative Homans.  No calf tenderness bilaterally.  No abnormalities noted in the upper extremities.   Skin:   No discoloration in the lower extremities.  There are slight venous varicosities bilaterally cutaneously.       Assessment:       1. Diabetic polyneuropathy associated with type 2 diabetes mellitus    2. Pain and swelling of left lower leg    3. Hypertension associated with diabetes    4. Vitamin D deficiency        Plan:       1.  The patient feels she is intolerant of the vitamin D 50,000 unit dose.  We will begin over-the-counter vitamin D 2000 units per day.  She will follow-up with her primary care physician again in one month.

## 2017-04-12 NOTE — MR AVS SNAPSHOT
[facility]  [address]  [place] [address]  Phone: [phone]  Fax: [phone]                  Dai Roper   2017 10:20 AM   Office Visit    Description:  Female : 1965   Provider:  David Velasquez Jr., MD   Department:  [facility]           Reason for Visit     Anxiety     Leg Pain                To Do List           Future Appointments        Provider Department Dept Phone    2017 8:00 AM [facility] [phone]    2017 8:00 AM Sumi Corrigan MD [facility] [phone]      Goals (5 Years of Data)     None      Follow-Up and Disposition     Return in about 4 weeks (around 5/10/2017) for [place].      [facility] On Call     [facility] On Call Nurse Care Line -  Assistance  Unless otherwise directed by your provider, please contact [facility], our nurse care line that is available for  assistance.     Registered nurses in the [facility] On Call Center provide: appointment scheduling, clinical advisement, health education, and other advisory services.  Call: [phone] (toll free)               Medications           Message regarding Medications     Verify the changes and/or additions to your medication regime listed below are the same as discussed with your clinician today.  If any of these changes or additions are incorrect, please notify your healthcare provider.             Verify that the below list of medications is an accurate representation of the medications you are currently taking.  If none reported, the list may be blank. If incorrect, please contact your healthcare provider. Carry this list with you in case of emergency.           Current Medications     albuterol (ACCUNEB) 1.25 mg/3 mL Nebu Take 3 mLs (1.25 mg total) by nebulization every 6 (six) hours as needed.    chlorhexidine (PERIDEX) 0.12 % solution     ergocalciferol  "(ERGOCALCIFEROL) 50,000 unit Cap Take 1 capsule (50,000 Units total) by mouth every 7 days.    fluticasone (FLOVENT HFA) 220 mcg/actuation inhaler Inhale 1 puff into the lungs 2 (two) times daily.    gabapentin (NEURONTIN) 300 MG capsule Take 1 capsule (300 mg total) by mouth 3 (three) times daily.    lisinopril-hydrochlorothiazide (PRINZIDE,ZESTORETIC) 20-12.5 mg per tablet Take 1 tablet by mouth once daily.    metformin (GLUCOPHAGE) 500 MG tablet Take 1 tablet (500 mg total) by mouth daily with breakfast.    simvastatin (ZOCOR) 40 MG tablet Take 1 tablet (40 mg total) by mouth every evening.    econazole nitrate 1 % cream Apply topically 2 (two) times daily.           Clinical Reference Information           Your Vitals Were     BP Pulse Temp Height Weight Last Period    133/82 (BP Location: Right arm, Patient Position: Sitting, BP Method: Automatic) 76 98.3 °F (36.8 °C) 5' 1" (1.549 m) 109.5 kg (241 lb 6.5 oz) 02/05/2013    BMI                45.61 kg/m2          Blood Pressure          Most Recent Value    BP  133/82      Allergies as of 4/12/2017     No Known Allergies      Immunizations Administered on Date of Encounter - 4/12/2017     None      [facility] Sign-Up     Activating your [facility] account is as easy as 1-2-3!     1) Visit [email or web address], select Sign Up Now, enter this activation code and your date of birth, then select Next.  [email or web address]  Expires: 5/12/2017  8:59 PM      2) Create a username and password to use when you visit [facility] in the future and select a security question in case you lose your password and select Next.    3) Enter your e-mail address and click Sign Up!    Additional Information  If you have questions, please e-mail [email or web address] or call [phone] to talk to our [facility] staff. Remember, [facility] is NOT to be used for urgent needs. For medical emergencies, dial 911.         Language Assistance Services     ATTENTION: Language assistance services are " available, free of charge. Please call [phone].      ATENCIÓN: Si habla español, tiene a kelly disposición servicios gratuitos de asistencia lingüística. Llame al [phone].     CHÚ Ý: N?u b?n nói Ti?ng Vi?t, có các d?ch v? h? tr? ngôn ng? mi?n phí dành cho b?n. G?i s? [phone].         [facility] complies with applicable Federal civil rights laws and does not discriminate on the basis of race, color, national origin, age, disability, or sex.

## 2017-04-13 LAB
ESTIMATED AVG GLUCOSE: 123 MG/DL
HBA1C MFR BLD HPLC: 5.9 %

## 2017-05-08 ENCOUNTER — TELEPHONE (OUTPATIENT)
Dept: INTERNAL MEDICINE | Facility: CLINIC | Age: 52
End: 2017-05-08

## 2017-05-08 NOTE — TELEPHONE ENCOUNTER
----- Message from Anu Black sent at 5/8/2017 10:55 AM CDT -----  Contact: patient, [phone]  The patient will not be able to make her appointment on Wednesday. She would like Dr. Corrigan to see her tomorrow.  Please call the patient with this appointment.     Thanks!

## 2017-05-08 NOTE — TELEPHONE ENCOUNTER
----- Message from Deja Walters sent at 5/8/2017  3:16 PM CDT -----  Contact: Self/ [phone]   Type: Returning a call    Who left a message? Faith    When did the practice call? Today     Comments: pt stated she miss a call from the office. Please call and advise     Thank you

## 2017-05-11 ENCOUNTER — OFFICE VISIT (OUTPATIENT)
Dept: INTERNAL MEDICINE | Facility: CLINIC | Age: 52
End: 2017-05-11
Payer: MEDICARE

## 2017-05-11 VITALS
OXYGEN SATURATION: 99 % | WEIGHT: 238.56 LBS | SYSTOLIC BLOOD PRESSURE: 111 MMHG | DIASTOLIC BLOOD PRESSURE: 80 MMHG | HEIGHT: 61 IN | BODY MASS INDEX: 45.04 KG/M2 | HEART RATE: 75 BPM

## 2017-05-11 DIAGNOSIS — I15.2 HYPERTENSION ASSOCIATED WITH DIABETES: ICD-10-CM

## 2017-05-11 DIAGNOSIS — Z12.11 COLON CANCER SCREENING: Primary | ICD-10-CM

## 2017-05-11 DIAGNOSIS — E78.5 HYPERLIPIDEMIA ASSOCIATED WITH TYPE 2 DIABETES MELLITUS: ICD-10-CM

## 2017-05-11 DIAGNOSIS — E11.8 TYPE 2 DIABETES MELLITUS WITH COMPLICATION, WITHOUT LONG-TERM CURRENT USE OF INSULIN: ICD-10-CM

## 2017-05-11 DIAGNOSIS — E11.59 HYPERTENSION ASSOCIATED WITH DIABETES: ICD-10-CM

## 2017-05-11 DIAGNOSIS — E11.69 HYPERLIPIDEMIA ASSOCIATED WITH TYPE 2 DIABETES MELLITUS: ICD-10-CM

## 2017-05-11 PROCEDURE — 1160F RVW MEDS BY RX/DR IN RCRD: CPT | Mod: S$GLB,,, | Performed by: INTERNAL MEDICINE

## 2017-05-11 PROCEDURE — 3079F DIAST BP 80-89 MM HG: CPT | Mod: S$GLB,,, | Performed by: INTERNAL MEDICINE

## 2017-05-11 PROCEDURE — 3074F SYST BP LT 130 MM HG: CPT | Mod: S$GLB,,, | Performed by: INTERNAL MEDICINE

## 2017-05-11 PROCEDURE — 4010F ACE/ARB THERAPY RXD/TAKEN: CPT | Mod: S$GLB,,, | Performed by: INTERNAL MEDICINE

## 2017-05-11 PROCEDURE — 99999 PR PBB SHADOW E&M-EST. PATIENT-LVL III: CPT | Mod: PBBFAC,,, | Performed by: INTERNAL MEDICINE

## 2017-05-11 PROCEDURE — 3044F HG A1C LEVEL LT 7.0%: CPT | Mod: S$GLB,,, | Performed by: INTERNAL MEDICINE

## 2017-05-11 PROCEDURE — 99499 UNLISTED E&M SERVICE: CPT | Mod: S$GLB,,, | Performed by: INTERNAL MEDICINE

## 2017-05-11 PROCEDURE — 99215 OFFICE O/P EST HI 40 MIN: CPT | Mod: S$GLB,,, | Performed by: INTERNAL MEDICINE

## 2017-05-11 RX ORDER — METFORMIN HYDROCHLORIDE 500 MG/1
500 TABLET, EXTENDED RELEASE ORAL
Qty: 90 TABLET | Refills: 3 | Status: SHIPPED | OUTPATIENT
Start: 2017-05-11 | End: 2017-08-28 | Stop reason: DRUGHIGH

## 2017-05-11 RX ORDER — NAPROXEN SODIUM 220 MG/1
81 TABLET, FILM COATED ORAL DAILY
Refills: 0 | COMMUNITY
Start: 2017-05-11 | End: 2017-11-06 | Stop reason: SDUPTHER

## 2017-05-11 RX ORDER — LISINOPRIL AND HYDROCHLOROTHIAZIDE 10; 12.5 MG/1; MG/1
1 TABLET ORAL DAILY
Qty: 90 TABLET | Refills: 3 | Status: SHIPPED | OUTPATIENT
Start: 2017-05-11 | End: 2017-11-06 | Stop reason: DRUGHIGH

## 2017-05-11 RX ORDER — ACETAMINOPHEN 500 MG
5000 TABLET ORAL DAILY
COMMUNITY
Start: 2017-05-11 | End: 2017-07-11

## 2017-05-11 RX ORDER — ATORVASTATIN CALCIUM 40 MG/1
40 TABLET, FILM COATED ORAL DAILY
Qty: 90 TABLET | Refills: 3 | Status: SHIPPED | OUTPATIENT
Start: 2017-05-11 | End: 2017-11-06 | Stop reason: SDUPTHER

## 2017-05-11 NOTE — PROGRESS NOTES
PharmD [facility] Clinic Note        HPI:   Medication List:        albuterol 1.25 mg/3 mL Nebu   Commonly known as:  ACCUNEB   Take 3 mLs (1.25 mg total) by nebulization every 6 (six) hours as needed.       econazole nitrate 1 % cream   Apply topically 2 (two) times daily.       fluticasone 220 mcg/actuation inhaler   Commonly known as:  FLOVENT HFA       gabapentin 300 MG capsule   Commonly known as:  NEURONTIN   Take 1 capsule (300 mg total) by mouth 3 (three) times daily.       lisinopril-hydrochlorothiazide 20-12.5 mg per tablet   Commonly known as:  PRINZIDE,ZESTORETIC   Take 1 tablet by mouth once daily.       metformin 500 MG tablet   Commonly known as:  GLUCOPHAGE   Take 1 tablet (500 mg total) by mouth daily with breakfast.       simvastatin 40 MG tablet   Commonly known as:  ZOCOR   Take 1 tablet (40 mg total) by mouth every evening.             Medications have been reviewed and reconciled.    In home Medication list is consistent with medication regimen.     Medication Therapy Plan for Medication related problems since discharge and Associated Resolutions:    1.  DM: Controlled with HbA1C 5.9, goal HbA1C<7. Patient reports continued GI intolerance with metformin despite recent dose reduction. Suggested trying metformin 500mg ER once daily trial. As patient is >49 y/o with more than one CV risk factor, she is a candidate for primary ASA prophylaxis. If patient still unable to tolerate metformin, may consider alternative oral agents including TZDs or DPP-4 agent, in setting of young age and low A1C would not consider TAVAREZ for alternative at this time.       Neuropathy: Patient adherent with gabapentin 300mg TID. Reporting good pain relief and denies any CNS side effects.        Nephropathy: Annal Urine Albumin/Cr ordered but not obtained. Patient on lisinopril for HTN.     2.  Asthma: Patient utilizing albuterol twice weekly with no complaints. Utilizing Flovent controller PRN however and unlikely receiving  full benefit.     3.  Hyperlipidemia: ASCVD risk 3.2% on moderate statin therapy, however, 01/2017 lab work of LDL >190 would make patient candidate for high intensity statin. Recommend atorvastatin 40mg once daily.     4 . Vitamin D: VitD-25~10. Patient was uncontrolled and prescribed ergocalciferol 50,000 units but reported skin rash/edema. Patient was instructed to  an OTC supplement but reported she has not done so.       Medication Therapy Plan:  There are no diagnoses linked to this encounter.      Kay Aguirre, PharmD.   [id number]

## 2017-05-11 NOTE — ASSESSMENT & PLAN NOTE
A1c 5.9 in 4/2017, /80 today, patient reports home reading of 90/74. Controlled DM, morbid obesity  · Continue weight loss  · F/U Health   · Patient no-showed   · Start medical fitness program with silver sneakers  · Meets with Bethanie next week  · Monitor home BP  · Decrease BP med to lisinopril 10- HCTZ 12.5mg

## 2017-05-11 NOTE — PATIENT INSTRUCTIONS
TODAY:  - order colonoscopy  - change metformin to extended release   + take AFTER lunch  - start aspirin    + order from [facility] [facility]  - change simvastatin to atorvastatin 40mg  - start vitamin D 5000 U   - schedule podiatry appt  - decreased BP med to lisinopril 10mg- HCTZ 12.5mg  - transferring all meds to [facility]

## 2017-05-11 NOTE — ASSESSMENT & PLAN NOTE
A1c 5.9 in 4/2017, compliant metformin 500mg daily (causing diarrhea),  in 1/2017  · Advised to decrease trans fats  · Change statin to high-intensity  · Start atorvastatin 40mg  · Start ASA 81mg  · Continue exercise and lifestyle changes

## 2017-05-11 NOTE — MR AVS SNAPSHOT
[facility]  [address]  [place] [address]  Phone: [phone]  Fax: [phone]                  Dai Roper   2017 10:00 AM   Office Visit    Description:  Female : 1965   Provider:  Sumi Corrigan MD   Department:  [facility]           Reason for Visit     Follow-up     Mammogram Results     Colonoscopy (Ref)           Diagnoses this Visit        Comments    Colon cancer screening    -  Primary     Hypertension associated with diabetes         Type 2 diabetes mellitus with complication, without long-term current use of insulin         Hyperlipidemia associated with type 2 diabetes mellitus                To Do List           Future Appointments        Provider Department Dept Phone    5/15/2017 9:45 AM Shyanne Reeder DPM [facility] [phone]    2017 8:00 AM Sumi Corrigan MD [facility] [phone]    2017 10:30 AM Sumi Corrigan MD [facility] [phone]      To Schedule:     Please call the Endoscopy Department at [phone] to schedule your appointment.          Goals (5 Years of Data)     None      Follow-Up and Disposition     Return in about 2 months (around 2017) for established patient follow-up.       These Medications        Disp Refills Start End    metformin (GLUCOPHAGE-XR) 500 MG 24 hr tablet 90 tablet 3 2017    Take 1 tablet (500 mg total) by mouth daily with breakfast. - Oral    Pharmacy: [facility] [address] [place] [address] Ph #: [phone]       atorvastatin (LIPITOR) 40 MG tablet 90 tablet 3 2017    Take 1 tablet (40 mg total) by mouth once daily. - Oral    Pharmacy: [facility] [address] [facility] Ph #: [phone]       lisinopril-hydrochlorothiazide (PRINZIDE,ZESTORETIC) 10-12.5 mg per tablet 90 tablet 3 2017  5/11/2018    Take 1 tablet by mouth once daily. - Oral    Pharmacy: [facility] - [place], [place] - [facility] [address] Ph #: [phone]         PURCHASE these Medications (No prescription required)        Start End    aspirin 81 MG Chew 5/11/2017 5/11/2018    Sig - Route: Take 1 tablet (81 mg total) by mouth once daily. - Oral    Class: OTC    cholecalciferol, vitamin D3, (VITAMIN D3) 5,000 unit Tab 5/11/2017     Sig - Route: Take 5,000 Units by mouth once daily. - Oral    Class: OTC      [facility] On Call     [facility] Nurse Care Line - 24/7 Assistance  Unless otherwise directed by your provider, please contact [facility], our nurse care line that is available for 24/7 assistance.     Registered nurses in the [facility] provide: appointment scheduling, clinical advisement, health education, and other advisory services.  Call: [phone] (toll free)               Medications           Message regarding Medications     Verify the changes and/or additions to your medication regime listed below are the same as discussed with your clinician today.  If any of these changes or additions are incorrect, please notify your healthcare provider.        START taking these NEW medications        Refills    metformin (GLUCOPHAGE-XR) 500 MG 24 hr tablet 3    Sig: Take 1 tablet (500 mg total) by mouth daily with breakfast.    Class: Print    Route: Oral    aspirin 81 MG Chew 0    Sig: Take 1 tablet (81 mg total) by mouth once daily.    Class: OTC    Route: Oral    atorvastatin (LIPITOR) 40 MG tablet 3    Sig: Take 1 tablet (40 mg total) by mouth once daily.    Class: Normal    Route: Oral    cholecalciferol, vitamin D3, (VITAMIN D3) 5,000 unit Tab     Sig: Take 5,000 Units by mouth once daily.    Class: OTC    Route: Oral    lisinopril-hydrochlorothiazide (PRINZIDE,ZESTORETIC) 10-12.5 mg per tablet 3    Sig: Take 1 tablet by mouth once daily.    Class: Normal    Route: Oral      STOP  "taking these medications     chlorhexidine (PERIDEX) 0.12 % solution     ergocalciferol (ERGOCALCIFEROL) 50,000 unit Cap Take 1 capsule (50,000 Units total) by mouth every 7 days.    metformin (GLUCOPHAGE) 500 MG tablet Take 1 tablet (500 mg total) by mouth daily with breakfast.    simvastatin (ZOCOR) 40 MG tablet Take 1 tablet (40 mg total) by mouth every evening.    lisinopril-hydrochlorothiazide (PRINZIDE,ZESTORETIC) 20-12.5 mg per tablet Take 1 tablet by mouth once daily.           Verify that the below list of medications is an accurate representation of the medications you are currently taking.  If none reported, the list may be blank. If incorrect, please contact your healthcare provider. Carry this list with you in case of emergency.           Current Medications     albuterol (ACCUNEB) 1.25 mg/3 mL Nebu Take 3 mLs (1.25 mg total) by nebulization every 6 (six) hours as needed.    fluticasone (FLOVENT HFA) 220 mcg/actuation inhaler Inhale 1 puff into the lungs 2 (two) times daily.    gabapentin (NEURONTIN) 300 MG capsule Take 1 capsule (300 mg total) by mouth 3 (three) times daily.    aspirin 81 MG Chew Take 1 tablet (81 mg total) by mouth once daily.    atorvastatin (LIPITOR) 40 MG tablet Take 1 tablet (40 mg total) by mouth once daily.    cholecalciferol, vitamin D3, (VITAMIN D3) 5,000 unit Tab Take 5,000 Units by mouth once daily.    econazole nitrate 1 % cream Apply topically 2 (two) times daily.    lisinopril-hydrochlorothiazide (PRINZIDE,ZESTORETIC) 10-12.5 mg per tablet Take 1 tablet by mouth once daily.    metformin (GLUCOPHAGE-XR) 500 MG 24 hr tablet Take 1 tablet (500 mg total) by mouth daily with breakfast.           Clinical Reference Information           Your Vitals Were     BP Pulse Height Weight Last Period SpO2    111/80 (BP Location: Left arm, Patient Position: Sitting, BP Method: Manual) 75 5' 1" (1.549 m) 108.2 kg (238 lb 8.6 oz) 02/05/2013 99%    BMI                45.07 kg/m2        "   Blood Pressure          Most Recent Value    BP  111/80      Allergies as of 5/11/2017     Vitamin D Analogue      Immunizations Administered on Date of Encounter - 5/11/2017     None      Orders Placed During Today's Visit      Normal Orders This Visit    Case request GI: COLONOSCOPY       [facility] Sign-Up     Activating your [facility] account is as easy as 1-2-3!     1) Visit [email or web address], select Sign Up Now, enter this activation code and your date of birth, then select Next.  [email or web address]  Expires: 5/12/2017  8:59 PM      2) Create a username and password to use when you visit [facility] in the future and select a security question in case you lose your password and select Next.    3) Enter your e-mail address and click Sign Up!    Additional Information  If you have questions, please e-mail [email or web address] or call [phone] to talk to our [facility] staff. Remember, [facility] is NOT to be used for urgent needs. For medical emergencies, dial 911.         Instructions    TODAY:  - order colonoscopy  - change metformin to extended release   + take AFTER lunch  - start aspirin    + order from [facility]  - change simvastatin to atorvastatin 40mg  - start vitamin D 5000 U   - schedule podiatry appt  - decreased BP med to lisinopril 10mg- HCTZ 12.5mg  - [facility]       Language Assistance Services     ATTENTION: Language assistance services are available, free of charge. Please call [phone].      ATENCIÓN: Si habla español, tiene a kelly disposición servicios gratuitos de asistencia lingüística. Llame al [phone].     CHÚ Ý: N?u b?n nói Ti?ng Vi?t, có các d?ch v? h? tr? ngôn ng? mi?n phí dành cho b?n. G?i s? [phone].         [facility] complies with applicable Federal civil rights laws and does not discriminate on the basis of race, color, national origin, age, disability, or sex.

## 2017-05-11 NOTE — PROGRESS NOTES
Primary Care Provider Appointment    Subjective:      Patient ID: Dai Roper is a 51 y.o. female with obesity, HTN, DM    Chief Complaint: Follow-up (Pt is here for an over all check up ); Mammogram Results (PT would like to go over results ); and Colonoscopy (Ref) (Pt would like to get test done)  Patient here to review mammogram and order colonoscopy. Mammogram showed abnormal calcifications in LUQ in outer region. Radiology note states that prior mammo is requested for review. Patient wanting explanation of this process.    She reports some intentional weight loss, although weight in EPIC is variable. She does state that her clothes are not as tight anymore. She walks 5X weekly, about 5miles daily. This takes her about 1.5-2 hours walking in the park in [place]. She has cut back on her caloric intake.    Patient experienced edema and pain of LLE on 4/12/17, and was seen by  PCP who discontinued high-dose vit D3. Is tolerating OTC low-dose vit D. Last Vit D level was 11 in 4/2017. Her BP was elevated at this appointment.    Prior to PCP eval today in exam room, she was seen by pharmacy team who recommended ASA initation and extended release metformin due to s/e of diarrhea.    Past Surgical History:   Procedure Laterality Date    CARPAL TUNNEL RELEASE      HERNIA REPAIR         Past Medical History:   Diagnosis Date    Asthma     Diabetes mellitus     Hypertension        Review of Systems   Constitutional: Positive for activity change and unexpected weight change. Negative for fatigue.        Intentional weight loss   Cardiovascular: Negative for leg swelling.   Gastrointestinal: Positive for abdominal pain and diarrhea. Negative for constipation.   Genitourinary: Negative for decreased urine volume and urgency.   Musculoskeletal: Negative for gait problem and joint swelling.   Neurological: Negative for tremors.   Hematological: Does not bruise/bleed easily.       Objective:   /80 (BP Location:  "Left arm, Patient Position: Sitting, BP Method: Manual)  Pulse 75  Ht 5' 1" (1.549 m)  Wt 108.2 kg (238 lb 8.6 oz)  LMP 02/05/2013  SpO2 99%  BMI 45.07 kg/m2    Physical Exam   Constitutional: She is oriented to person, place, and time. She appears well-developed and well-nourished.   obese   HENT:   Head: Normocephalic and atraumatic.   Neck: Normal range of motion.   Cardiovascular: Normal rate.    Pulmonary/Chest: Effort normal.   Abdominal:   Obese abdomen   Neurological: She is alert and oriented to person, place, and time.   Psychiatric: She has a normal mood and affect. Her behavior is normal. Thought content normal.   Vitals reviewed.      Lab Results   Component Value Date    WBC 7.58 03/28/2017    HGB 12.3 03/28/2017    HCT 37.7 03/28/2017     03/28/2017    CHOL 254 (H) 04/12/2017    TRIG 84 04/12/2017    HDL 80 (H) 04/12/2017    ALT 13 03/28/2017    AST 16 03/28/2017     04/12/2017    K 4.5 04/12/2017     04/12/2017    CREATININE 0.8 04/12/2017    BUN 9 04/12/2017    CO2 30 (H) 04/12/2017    TSH 1.261 01/03/2017    INR 1.0 03/28/2017    HGBA1C 5.9 04/12/2017         Assessment:   51 y.o. female with multiple co-morbid illnesses here to continue work-up of chronic issues notably DM, HTN, HLD, obesity.     Plan:     Problem List Items Addressed This Visit        Cardiac    Hypertension associated with diabetes     A1c 5.9 in 4/2017, /80 today, patient reports home reading of 90/74. Controlled DM, morbid obesity  · Continue weight loss  · F/U Health   · Patient no-showed   · Start medical fitness program with silver sneakers  · Meets with Bethanie next week  · Monitor home BP  · Decrease BP med to lisinopril 10- HCTZ 12.5mg         Relevant Medications    metformin (GLUCOPHAGE-XR) 500 MG 24 hr tablet    aspirin 81 MG Chew    lisinopril-hydrochlorothiazide (PRINZIDE,ZESTORETIC) 10-12.5 mg per tablet       Endocrine    Type 2 diabetes mellitus with complication, without " long-term current use of insulin     A1c 5.9 in 4/2017, compliant metformin 500mg daily but causing diarrhea  · Advised to cut back on high-glycemic foods  · Medical fitness consult, silver sneakers membership  · Change metformin to XR 500mg once daily AFTER largest meal of the day   · Metformin 500mg XR after lunch  · Continue exercise  · Diabetic eye screening photo 1/2017  · No DR         Relevant Medications    metformin (GLUCOPHAGE-XR) 500 MG 24 hr tablet    aspirin 81 MG Chew       Fluids/Electrolytes/Nutrition/GI    Hyperlipidemia associated with type 2 diabetes mellitus     A1c 5.9 in 4/2017, compliant metformin 500mg daily (causing diarrhea),  in 1/2017  · Advised to decrease trans fats  · Change statin to high-intensity  · Start atorvastatin 40mg  · Start ASA 81mg  · Continue exercise and lifestyle changes         Relevant Medications    metformin (GLUCOPHAGE-XR) 500 MG 24 hr tablet    aspirin 81 MG Chew    atorvastatin (LIPITOR) 40 MG tablet      Other Visit Diagnoses     Colon cancer screening    -  Primary    Relevant Orders    Case request GI: COLONOSCOPY (Completed)          Health Maintenance       Date Due Completion Date    Pap Smear 6/30/1984 ---DONE    Colonoscopy 6/30/2013 ---TODAY    TETANUS VACCINE 1/6/2016 1/6/2006    Influenza Vaccine 8/1/2017 1/5/2017    Hemoglobin A1c 10/12/2017 4/12/2017    Foot Exam 1/5/2018 1/5/2017    Eye Exam 1/9/2018 1/9/2017    Lipid Panel 4/12/2018 4/12/2017    Mammogram 4/12/2019 4/12/2017    Pneumococcal PPSV23 (Medium Risk) (2) 6/30/2028 4/3/2017          Return in about 2 months (around 7/11/2017) for established patient follow-up. . One hour spent with this patient today, half of that in counseling.    Sumi Corrigan MD/MPH  Internal Medicine  [facility]  [phone]

## 2017-05-11 NOTE — ASSESSMENT & PLAN NOTE
A1c 5.9 in 4/2017, compliant metformin 500mg daily but causing diarrhea  · Advised to cut back on high-glycemic foods  · Medical fitness consult, silver sneakers membership  · Change metformin to XR 500mg once daily AFTER largest meal of the day   · Metformin 500mg XR after lunch  · Continue exercise  · Diabetic eye screening photo 1/2017  · No

## 2017-05-16 ENCOUNTER — TELEPHONE (OUTPATIENT)
Dept: INTERNAL MEDICINE | Facility: CLINIC | Age: 52
End: 2017-05-16

## 2017-05-16 DIAGNOSIS — R92.8 ABNORMAL MAMMOGRAM: Primary | ICD-10-CM

## 2017-05-16 NOTE — TELEPHONE ENCOUNTER
Pt has been advised that radiologist would like to do further imaging of her breast , and that as soon as we get everything in order that I will touch basis with her in getting her scheduled .       Thanks Dr. Corrigan

## 2017-05-16 NOTE — TELEPHONE ENCOUNTER
Please let patient know that the radiologists want to do more imaging of her breast. I am in touch with them about how to get this ordered.    Thanks,  KJ

## 2017-05-17 NOTE — TELEPHONE ENCOUNTER
A diagnostic mammogram of L breast with tomosynthesis and CAD was ordered, per Radiology.    Faith- could you please schedule patient ASAP?    Thanks,  KJ

## 2017-05-18 ENCOUNTER — TELEPHONE (OUTPATIENT)
Dept: INTERNAL MEDICINE | Facility: CLINIC | Age: 52
End: 2017-05-18

## 2017-05-18 ENCOUNTER — HOSPITAL ENCOUNTER (OUTPATIENT)
Dept: RADIOLOGY | Facility: HOSPITAL | Age: 52
Discharge: HOME | End: 2017-05-18
Attending: INTERNAL MEDICINE
Payer: MEDICARE

## 2017-05-18 DIAGNOSIS — R92.8 ABNORMAL MAMMOGRAM: ICD-10-CM

## 2017-05-18 DIAGNOSIS — R92.8 ABNORMAL MAMMOGRAM: Primary | ICD-10-CM

## 2017-05-18 PROCEDURE — 77065 DX MAMMO INCL CAD UNI: CPT | Mod: 26,LT,, | Performed by: RADIOLOGY

## 2017-05-18 PROCEDURE — 77061 BREAST TOMOSYNTHESIS UNI: CPT | Mod: TC,LT

## 2017-05-18 PROCEDURE — 77061 BREAST TOMOSYNTHESIS UNI: CPT | Mod: 26,LT,, | Performed by: RADIOLOGY

## 2017-05-18 NOTE — TELEPHONE ENCOUNTER
Pt has been advised about results and stressed great understanding , Pt has also been scheduled for 6 month f/u mammo .      Thanks Dr. Corrigan

## 2017-05-18 NOTE — TELEPHONE ENCOUNTER
Please let patient know that the abnormalities on previous mammogram were determined to be likely benign. She needs another mammo in 6 months. The order is placed, could you help her with scheduling?    Thanks,  ELOINA

## 2017-06-01 ENCOUNTER — TELEPHONE (OUTPATIENT)
Dept: INTERNAL MEDICINE | Facility: CLINIC | Age: 52
End: 2017-06-01

## 2017-06-01 NOTE — TELEPHONE ENCOUNTER
----- Message from Elyssa Vega sent at 6/1/2017  1:54 PM CDT -----  Contact: Self/[phone] cell   Pt said that she is calling in regards to needing to speak with the nurse about a problem she is having( pt did not specify what the problem is). Please call and advise              Thank you

## 2017-06-01 NOTE — TELEPHONE ENCOUNTER
Pt stated that she has been swelling and she is unable to fit her clothes  , Pt stated that her weight is 240 and she is linwood concerned about this , Pt stated that she noticed this a 4  days ago  And thought that it would go away , Pt stated that she is taking her medication but is concerned that it may be the medication that she is taking.    Pt has been informed to come in and be seen on 06/01/2017

## 2017-06-02 ENCOUNTER — CLINICAL SUPPORT (OUTPATIENT)
Dept: CARDIOLOGY | Facility: CLINIC | Age: 52
End: 2017-06-02
Payer: MEDICARE

## 2017-06-02 ENCOUNTER — OFFICE VISIT (OUTPATIENT)
Dept: INTERNAL MEDICINE | Facility: CLINIC | Age: 52
End: 2017-06-02
Payer: MEDICARE

## 2017-06-02 VITALS
WEIGHT: 239 LBS | SYSTOLIC BLOOD PRESSURE: 98 MMHG | BODY MASS INDEX: 43.98 KG/M2 | HEIGHT: 62 IN | OXYGEN SATURATION: 97 % | HEART RATE: 83 BPM | DIASTOLIC BLOOD PRESSURE: 60 MMHG

## 2017-06-02 DIAGNOSIS — E11.69 HYPERLIPIDEMIA ASSOCIATED WITH TYPE 2 DIABETES MELLITUS: ICD-10-CM

## 2017-06-02 DIAGNOSIS — E78.5 HYPERLIPIDEMIA ASSOCIATED WITH TYPE 2 DIABETES MELLITUS: ICD-10-CM

## 2017-06-02 DIAGNOSIS — R60.9 SWELLING: ICD-10-CM

## 2017-06-02 DIAGNOSIS — I15.2 HYPERTENSION ASSOCIATED WITH DIABETES: ICD-10-CM

## 2017-06-02 DIAGNOSIS — M79.662 PAIN AND SWELLING OF LEFT LOWER LEG: ICD-10-CM

## 2017-06-02 DIAGNOSIS — M79.89 PAIN AND SWELLING OF LEFT LOWER LEG: ICD-10-CM

## 2017-06-02 DIAGNOSIS — E55.9 VITAMIN D DEFICIENCY: ICD-10-CM

## 2017-06-02 DIAGNOSIS — E11.59 HYPERTENSION ASSOCIATED WITH DIABETES: ICD-10-CM

## 2017-06-02 LAB
B-HCG UR QL: NEGATIVE
BILIRUB UR QL STRIP: NEGATIVE
CLARITY UR REFRACT.AUTO: CLEAR
COLOR UR AUTO: YELLOW
CREAT UR-MCNC: 109 MG/DL
GLUCOSE SERPL-MCNC: 81 MG/DL (ref 70–110)
GLUCOSE UR QL STRIP: NEGATIVE
HGB UR QL STRIP: NEGATIVE
KETONES UR QL STRIP: NEGATIVE
LEUKOCYTE ESTERASE UR QL STRIP: NEGATIVE
MICROALBUMIN UR DL<=1MG/L-MCNC: 4 UG/ML
MICROALBUMIN/CREATININE RATIO: 3.7 UG/MG
NITRITE UR QL STRIP: NEGATIVE
PH UR STRIP: 6 [PH] (ref 5–8)
PROT UR QL STRIP: NEGATIVE
SP GR UR STRIP: 1.01 (ref 1–1.03)
URN SPEC COLLECT METH UR: NORMAL
UROBILINOGEN UR STRIP-ACNC: NEGATIVE EU/DL

## 2017-06-02 PROCEDURE — 93970 EXTREMITY STUDY: CPT | Mod: S$GLB,,, | Performed by: INTERNAL MEDICINE

## 2017-06-02 PROCEDURE — 99499 UNLISTED E&M SERVICE: CPT | Mod: S$GLB,,, | Performed by: INTERNAL MEDICINE

## 2017-06-02 PROCEDURE — 82948 REAGENT STRIP/BLOOD GLUCOSE: CPT | Mod: S$GLB,,, | Performed by: INTERNAL MEDICINE

## 2017-06-02 PROCEDURE — 3044F HG A1C LEVEL LT 7.0%: CPT | Mod: S$GLB,,, | Performed by: INTERNAL MEDICINE

## 2017-06-02 PROCEDURE — 99999 PR PBB SHADOW E&M-EST. PATIENT-LVL III: CPT | Mod: PBBFAC,,, | Performed by: INTERNAL MEDICINE

## 2017-06-02 PROCEDURE — 4010F ACE/ARB THERAPY RXD/TAKEN: CPT | Mod: S$GLB,,, | Performed by: INTERNAL MEDICINE

## 2017-06-02 PROCEDURE — 99215 OFFICE O/P EST HI 40 MIN: CPT | Mod: 25,S$GLB,, | Performed by: INTERNAL MEDICINE

## 2017-06-02 NOTE — ASSESSMENT & PLAN NOTE
Wells score of 3, chronic pain/swelling since 2011 (with neg DV US at that time). Differential: DM neuropathy, chronic DVT, venous insufficiency, venous stasis  · US of LE to eval for DVT scheduled in 2/2017  · Patient no-show  · Reschedule today  · D-dimer to asses in event patient is too obese for effective study

## 2017-06-02 NOTE — ASSESSMENT & PLAN NOTE
A1c 5.9 in 4/2017, compliant metformin 500mg daily (causing diarrhea),  in 1/2017  · Advised to decrease trans fats  · Continue atorvastatin 40mg  · Start ASA 81mg  · Continue exercise and lifestyle changes

## 2017-06-02 NOTE — ASSESSMENT & PLAN NOTE
Vit D level of 10 in 1/2017  · Did not tolerate high-dose vit D 50,000U due to swelling  · Compliant with 5000U daily for past month

## 2017-06-02 NOTE — PROGRESS NOTES
"Primary Care Provider Appointment    Subjective:      Patient ID: Dai Roper is a 51 y.o. female with acute swelling, recent fall    Chief Complaint: Leg Swelling (2+ days ago ,  denies warmth to area ); Swelling (2+ days ago , denies warmth to area); Medication Reaction (PT thinks that the lisinopril has her hair falling out(1+ month) , and is giving her bad body aches(1 + week), ); and Dizziness (1+ week , PT has not taken medication today at all )    Patient with recent fall at a family softball tournament. She endorses diffuse swelling now bilateral and both upper/lower extremities. She reports her hair is also falling out, and she is fatigued. She stopped taking her BP meds yesterday.     She has not been taking any metformin, because she was waiting for the extended release formulation. She has not started her ASA therapy.    Her last echo in 1/2017 was normal. Last LE US for DVT was 2011 and negative. Last CXR in 1/2017 was normal. No SOB, no CP.    She had an abnormal mammo in 5/2017, due for next in 11/2017.    Past Surgical History:   Procedure Laterality Date    CARPAL TUNNEL RELEASE      HERNIA REPAIR         Past Medical History:   Diagnosis Date    Asthma     Diabetes mellitus     Hypertension        Review of Systems   Constitutional: Negative for activity change and appetite change.        Intentional weight change   Respiratory: Negative for cough and shortness of breath.    Cardiovascular: Positive for leg swelling. Negative for chest pain.        Diffuse swelling   Musculoskeletal: Negative for back pain, gait problem and joint swelling.   Psychiatric/Behavioral: Negative for decreased concentration, dysphoric mood and hallucinations.       Objective:   BP 98/60 (BP Location: Right arm, Patient Position: Sitting, BP Method: Manual)   Pulse 83   Ht 5' 2" (1.575 m)   Wt 108.4 kg (238 lb 15.7 oz)   LMP 02/05/2013   SpO2 97%   BMI 43.71 kg/m²     Physical Exam   Constitutional: She is " "oriented to person, place, and time. She appears well-developed and well-nourished.   obese   HENT:   Head: Normocephalic and atraumatic.   Neck: Normal range of motion.   Cardiovascular: Normal rate.    Pulmonary/Chest: Effort normal.   Abdominal:   Obese abdomen   Musculoskeletal: She exhibits edema and tenderness.   Nonpitting trace edema LE  Subjective swelling of breast  Circumference of L calf 17.5", R calf 16.5"   Neurological: She is alert and oriented to person, place, and time.   Psychiatric: She has a normal mood and affect. Her behavior is normal. Thought content normal.   Vitals reviewed.      Lab Results   Component Value Date    WBC 7.58 03/28/2017    HGB 12.3 03/28/2017    HCT 37.7 03/28/2017     03/28/2017    CHOL 254 (H) 04/12/2017    TRIG 84 04/12/2017    HDL 80 (H) 04/12/2017    ALT 13 03/28/2017    AST 16 03/28/2017     04/12/2017    K 4.5 04/12/2017     04/12/2017    CREATININE 0.8 04/12/2017    BUN 9 04/12/2017    CO2 30 (H) 04/12/2017    TSH 1.261 01/03/2017    INR 1.0 03/28/2017    HGBA1C 5.9 04/12/2017         Assessment:   51 y.o. female with multiple co-morbid illnesses here to continue work-up of chronic issues notably leg swelling, diffuse swelling, DM, HTN.     Plan:     Problem List Items Addressed This Visit        Neuro    Pain and swelling of left lower leg     Wells score of 3, chronic pain/swelling since 2011 (with neg DV US at that time). Differential: DM neuropathy, chronic DVT, venous insufficiency, venous stasis  · US of LE to eval for DVT scheduled in 2/2017  · Patient no-show  · Reschedule today  · D-dimer to asses in event patient is too obese for effective study         Relevant Orders    Comprehensive metabolic panel    CBC auto differential    Brain natriuretic peptide    Magnesium    T4, free    Testosterone, free    TSH    DICK    URINALYSIS    Microalbumin/creatinine urine ratio    Cardiology Lab US Lower Extremity Veins Bilateral    Protime-INR    " PREGNANCY TEST, URINE RAPID    D dimer, quantitative       Cardiac    Hypertension associated with diabetes     A1c 5.9 in 4/2017, BP 98/60 today, but elevated previously  · Hold all BP meds  · Check electrolytes         Relevant Orders    Comprehensive metabolic panel    Magnesium    POCT Glucose       Fluids/Electrolytes/Nutrition/GI    Hyperlipidemia associated with type 2 diabetes mellitus     A1c 5.9 in 4/2017, compliant metformin 500mg daily (causing diarrhea),  in 1/2017  · Advised to decrease trans fats  · Continue atorvastatin 40mg  · Start ASA 81mg  · Continue exercise and lifestyle changes         Relevant Orders    POCT Glucose    Vitamin D deficiency     Vit D level of 10 in 1/2017  · Did not tolerate high-dose vit D 50,000U due to swelling  · Compliant with 5000U daily for past month            Other    Swelling     Diffuse swelling on 6/2/17, no SOB, CP. Differential includes: nephrotic syndrome, pregnancy, liver disease, thyroid abnormalities, malnutrition   · Check CMP, BNP, pre-albumin, thyroid studies, UA, micro/cr ratio, INR  · Check DICK  · LE DVT US  · D-dimer (in event patient is too obese for effective US study)         Relevant Orders    Comprehensive metabolic panel    CBC auto differential    Brain natriuretic peptide    Magnesium    T4, free    Testosterone, free    TSH    DICK    URINALYSIS    Microalbumin/creatinine urine ratio    Cardiology Lab US Lower Extremity Veins Bilateral    Protime-INR    PREGNANCY TEST, URINE RAPID    D dimer, quantitative      Other Visit Diagnoses    None.         Health Maintenance       Date Due Completion Date    Pap Smear with HPV Cotest 06/30/1984 ---    Colonoscopy 06/30/2013 ---    TETANUS VACCINE 01/06/2016 1/6/2006    Influenza Vaccine 08/01/2017 1/5/2017    Hemoglobin A1c 10/12/2017 4/12/2017    Foot Exam 01/05/2018 1/5/2017    Eye Exam 01/09/2018 1/9/2017    Lipid Panel 04/12/2018 4/12/2017    Mammogram 05/18/2019 5/18/2017    Pneumococcal  PPSV23 (Medium Risk) (2) 06/30/2028 4/3/2017          Return if symptoms worsen or fail to improve, for established patient follow-up. . One hour spent with this patient today, half of that in counseling.    Sumi Corrigan MD/MPH  Internal Medicine  [facility]  [phone]

## 2017-06-02 NOTE — ASSESSMENT & PLAN NOTE
Diffuse swelling on 6/2/17, no SOB, CP. Differential includes: nephrotic syndrome, pregnancy, liver disease, thyroid abnormalities, malnutrition   · Check CMP, BNP, pre-albumin, thyroid studies, UA, micro/cr ratio, INR  · Check DICK  · LE DVT US  · D-dimer (in event patient is too obese for effective US study)

## 2017-06-02 NOTE — ASSESSMENT & PLAN NOTE
A1c 5.9 in 4/2017, BP 98/60 today, but elevated previously  · Hold all BP meds  · Check electrolytes

## 2017-06-05 ENCOUNTER — TELEPHONE (OUTPATIENT)
Dept: INTERNAL MEDICINE | Facility: CLINIC | Age: 52
End: 2017-06-05

## 2017-06-05 NOTE — TELEPHONE ENCOUNTER
Pt has been informed that all of her test have come back normal and that she does not have a clot in her leg  , nor protein in urine , and Pt is not pregnant . nor thyroid abnormalities. Pt has also been advised to avoid vitamin D supplements , due to Pt reporting that while taking the vitamin D supplement she gets swelling , Pt has been advised that in a rare event that her swelling is related to some ingredient in this vitamin formulation.  Pt stated that she is doing well , and stressed great understanding .      Thanks Dr. Corrigan

## 2017-06-05 NOTE — TELEPHONE ENCOUNTER
Please let patient know that I attempted to call her over the weekend with her results, but her voicemail was not set up.    Everything has come back normal. She does not have a leg clot, nor protein in her urine. She is not pregnant, no thyroid abnormalities.    I do advise her to avoid vitamin D supplementation altogether. She did report swelling while taking the high-dose vitamin D. In the rare event that her swelling is related to some ingredient in this vitamin formulation, let's try avoiding it to see if it improves.    Please ask her how she is doing today.    Thanks,  ELOINA

## 2017-06-22 ENCOUNTER — OFFICE VISIT (OUTPATIENT)
Dept: PODIATRY | Facility: CLINIC | Age: 52
End: 2017-06-22
Payer: MEDICARE

## 2017-06-22 VITALS
WEIGHT: 242 LBS | SYSTOLIC BLOOD PRESSURE: 132 MMHG | HEART RATE: 78 BPM | BODY MASS INDEX: 44.53 KG/M2 | DIASTOLIC BLOOD PRESSURE: 85 MMHG | HEIGHT: 62 IN | RESPIRATION RATE: 18 BRPM

## 2017-06-22 DIAGNOSIS — E11.49 TYPE II DIABETES MELLITUS WITH NEUROLOGICAL MANIFESTATIONS: Primary | ICD-10-CM

## 2017-06-22 DIAGNOSIS — L85.3 DRY SKIN: ICD-10-CM

## 2017-06-22 PROCEDURE — 3044F HG A1C LEVEL LT 7.0%: CPT | Mod: S$GLB,,, | Performed by: PODIATRIST

## 2017-06-22 PROCEDURE — 99213 OFFICE O/P EST LOW 20 MIN: CPT | Mod: S$GLB,,, | Performed by: PODIATRIST

## 2017-06-22 PROCEDURE — 99999 PR PBB SHADOW E&M-EST. PATIENT-LVL II: CPT | Mod: PBBFAC,,, | Performed by: PODIATRIST

## 2017-06-22 PROCEDURE — 4010F ACE/ARB THERAPY RXD/TAKEN: CPT | Mod: S$GLB,,, | Performed by: PODIATRIST

## 2017-06-22 PROCEDURE — 99499 UNLISTED E&M SERVICE: CPT | Mod: S$GLB,,, | Performed by: PODIATRIST

## 2017-06-22 NOTE — PROGRESS NOTES
Subjective:      Patient ID: Dai Roper is a 51 y.o. female.    Chief Complaint: PCP (Sumi Corrigan MD  6/02/17); Diabetic Foot Exam (Corn); Foot Pain; Numbness; and Nail Care    Dai is a 51 y.o. female who presents to the clinic upon referral from Dr. Chasidy lopez. provider found  for evaluation and treatment of diabetic feet. Dai has a past medical history of Asthma; Diabetes mellitus; and Hypertension. Patient relates no major problem with feet. Only complaints today consist of yearly DM foot examination  .    PCP: Sumi Corrigan MD    Date Last Seen by PCP:   Chief Complaint   Patient presents with    PCP     Sumi Corrigan MD  6/02/17    Diabetic Foot Exam     [place]    Foot Pain    Numbness    Nail Care         Current shoe gear: Casual shoes    Hemoglobin A1C   Date Value Ref Range Status   04/12/2017 5.9 4.5 - 6.2 % Final     Comment:     According to ADA guidelines, hemoglobin A1C <7.0% represents  optimal control in non-pregnant diabetic patients.  Different  metrics may apply to specific populations.   Standards of Medical Care in Diabetes - 2016.  For the purpose of screening for the presence of diabetes:  <5.7%     Consistent with the absence of diabetes  5.7-6.4%  Consistent with increasing risk for diabetes   (prediabetes)  >or=6.5%  Consistent with diabetes  Currently no consensus exists for use of hemoglobin A1C  for diagnosis of diabetes for children.     01/03/2017 6.0 4.5 - 6.2 % Final     Comment:     According to ADA guidelines, hemoglobin A1C <7.0% represents  optimal control in non-pregnant diabetic patients.  Different  metrics may apply to specific populations.   Standards of Medical Care in Diabetes - 2016.  For the purpose of screening for the presence of diabetes:  <5.7%     Consistent with the absence of diabetes  5.7-6.4%  Consistent with increasing risk for diabetes   (prediabetes)  >or=6.5%  Consistent with diabetes  Currently no consensus exists for  use of hemoglobin A1C  for diagnosis of diabetes for children.             Review of Systems   Constitution: Negative for chills, decreased appetite and fever.   Cardiovascular: Negative for leg swelling.   Musculoskeletal: Negative for arthritis, joint pain, joint swelling and myalgias.   Gastrointestinal: Negative for nausea and vomiting.   Neurological: Negative for loss of balance, numbness and paresthesias.           Objective:      Physical Exam   Constitutional: She is oriented to person, place, and time. She appears well-developed and well-nourished.   Cardiovascular:   Pulses:       Dorsalis pedis pulses are 2+ on the right side, and 2+ on the left side.        Posterior tibial pulses are 2+ on the right side, and 2+ on the left side.   Musculoskeletal: She exhibits no edema or tenderness.        Right ankle: Normal.        Left ankle: Normal.        Right foot: There is no swelling, no crepitus and no deformity.        Left foot: There is no swelling, no crepitus and no deformity.   Adequate joint range of motion without pain, limitation, nor crepitation Bilateral feet and ankle joints. Muscle strength is 5/5 in all groups bilaterally.         Feet:   Right Foot:   Protective Sensation: 10 sites tested. 10 sites sensed.   Left Foot:   Protective Sensation: 10 sites tested. 10 sites sensed.   Lymphadenopathy:   No palpable lymph nodes   Neurological: She is alert and oriented to person, place, and time. She has normal strength.   Skin: Skin is warm, dry and intact. No rash noted. No erythema. Nails show no clubbing.   Psychiatric: She has a normal mood and affect. Her behavior is normal.             Assessment:       Encounter Diagnoses   Name Primary?    Type II diabetes mellitus with neurological manifestations Yes    Dry skin          Plan:       Dai was seen today for pcp, diabetic foot exam, foot pain, numbness and nail care.    Diagnoses and all orders for this visit:    Type II diabetes  mellitus with neurological manifestations    Dry skin      I counseled the patient on her conditions, their implications and medical management.      - Shoe inspection. Diabetic Foot Education. Patient reminded of the importance of good nutrition and blood sugar control to help prevent podiatric complications of diabetes. Patient instructed on proper foot hygeine. We discussed wearing proper shoe gear, daily foot inspections, never walking without protective shoe gear, caution putting sharp instruments to feet     - Discussed DM foot care:  Wear comfortable, proper fitting shoes. Wash feet daily. Dry well. After drying, apply moisturizer to feet (no lotion to webspaces). Inspect feet daily for skin breaks, blisters, swelling, or redness. Wear cotton socks (preferably white)  Change socks every day. Do NOT walk barefoot. Do NOT use heating pads or warm/hot water soaks     - Discussed importance of daily moisturizer to the feet such as Gold bonds diabetic foot cream    - Patient is low risk for developing lower extremity issues secondary to diabetes    - RTC in  1 year for a diabetic foot exam  or sooner if problems    .

## 2017-06-28 DIAGNOSIS — Z12.11 COLON CANCER SCREENING: ICD-10-CM

## 2017-07-11 ENCOUNTER — TELEPHONE (OUTPATIENT)
Dept: BARIATRICS | Facility: CLINIC | Age: 52
End: 2017-07-11

## 2017-07-11 ENCOUNTER — DOCUMENTATION ONLY (OUTPATIENT)
Dept: INTERNAL MEDICINE | Facility: CLINIC | Age: 52
End: 2017-07-11

## 2017-07-11 ENCOUNTER — LAB VISIT (OUTPATIENT)
Dept: LAB | Facility: HOSPITAL | Age: 52
End: 2017-07-11
Attending: INTERNAL MEDICINE
Payer: MEDICARE

## 2017-07-11 ENCOUNTER — OFFICE VISIT (OUTPATIENT)
Dept: INTERNAL MEDICINE | Facility: CLINIC | Age: 52
End: 2017-07-11
Payer: MEDICARE

## 2017-07-11 VITALS
OXYGEN SATURATION: 99 % | DIASTOLIC BLOOD PRESSURE: 78 MMHG | WEIGHT: 242.31 LBS | HEIGHT: 62 IN | BODY MASS INDEX: 44.59 KG/M2 | SYSTOLIC BLOOD PRESSURE: 100 MMHG | HEART RATE: 73 BPM

## 2017-07-11 DIAGNOSIS — E66.01 MORBID OBESITY DUE TO EXCESS CALORIES: Primary | ICD-10-CM

## 2017-07-11 DIAGNOSIS — K02.9 DENTAL CARIES: ICD-10-CM

## 2017-07-11 DIAGNOSIS — E11.69 HYPERLIPIDEMIA ASSOCIATED WITH TYPE 2 DIABETES MELLITUS: ICD-10-CM

## 2017-07-11 DIAGNOSIS — E78.5 HYPERLIPIDEMIA ASSOCIATED WITH TYPE 2 DIABETES MELLITUS: ICD-10-CM

## 2017-07-11 DIAGNOSIS — I15.2 HYPERTENSION ASSOCIATED WITH DIABETES: ICD-10-CM

## 2017-07-11 DIAGNOSIS — E11.59 HYPERTENSION ASSOCIATED WITH DIABETES: ICD-10-CM

## 2017-07-11 DIAGNOSIS — E11.42 DIABETIC POLYNEUROPATHY ASSOCIATED WITH TYPE 2 DIABETES MELLITUS: ICD-10-CM

## 2017-07-11 DIAGNOSIS — Z12.11 COLON CANCER SCREENING: ICD-10-CM

## 2017-07-11 DIAGNOSIS — E11.8 TYPE 2 DIABETES MELLITUS WITH COMPLICATION, WITHOUT LONG-TERM CURRENT USE OF INSULIN: ICD-10-CM

## 2017-07-11 DIAGNOSIS — E55.9 VITAMIN D DEFICIENCY: ICD-10-CM

## 2017-07-11 DIAGNOSIS — E66.01 MORBID OBESITY DUE TO EXCESS CALORIES: ICD-10-CM

## 2017-07-11 LAB
ESTIMATED AVG GLUCOSE: 117 MG/DL
HBA1C MFR BLD HPLC: 5.7 %

## 2017-07-11 PROCEDURE — 99499 UNLISTED E&M SERVICE: CPT | Mod: S$GLB,,, | Performed by: INTERNAL MEDICINE

## 2017-07-11 PROCEDURE — 4010F ACE/ARB THERAPY RXD/TAKEN: CPT | Mod: S$GLB,,, | Performed by: INTERNAL MEDICINE

## 2017-07-11 PROCEDURE — 99215 OFFICE O/P EST HI 40 MIN: CPT | Mod: S$GLB,,, | Performed by: INTERNAL MEDICINE

## 2017-07-11 PROCEDURE — 83036 HEMOGLOBIN GLYCOSYLATED A1C: CPT

## 2017-07-11 PROCEDURE — 3044F HG A1C LEVEL LT 7.0%: CPT | Mod: S$GLB,,, | Performed by: INTERNAL MEDICINE

## 2017-07-11 PROCEDURE — 36415 COLL VENOUS BLD VENIPUNCTURE: CPT

## 2017-07-11 PROCEDURE — 99999 PR PBB SHADOW E&M-EST. PATIENT-LVL III: CPT | Mod: PBBFAC,,, | Performed by: INTERNAL MEDICINE

## 2017-07-11 NOTE — ASSESSMENT & PLAN NOTE
A1c 5.9 in 1/2017, compliant with metformin 500mg daily, taking gabapentin 900mg TID  · DM foot exam abnormal  · F/U podiatry  · Continue gabapentin 300mg TID

## 2017-07-11 NOTE — ASSESSMENT & PLAN NOTE
Multiple dental caries, DM, HLD  · Refer to dentist  · Fax referral to dentist office  · [facility] in [place]

## 2017-07-11 NOTE — ASSESSMENT & PLAN NOTE
A1c 5.9 in 4/2017, BP 98/60 today, but elevated previously  · Cotniue HCTZ-lisinopril  · Electrolytes normal

## 2017-07-11 NOTE — PROGRESS NOTES
"Primary Care Provider Appointment    Subjective:      Patient ID: Dai Roper is a 52 y.o. female with HTN, DM, obesity    Chief Complaint: Follow-up (PT is here for an over all check up pt stated that "everything is fine")    Patient is unable to lose weight, has attempted for past 6 months but is unable to keep the weight off. She is now walking 5 miles daily. She eats less food, fewer carbs, more vegetables. She uses regular sugar, and not artificial sweeteners. She is open to seeing a weight-loss specialist.     Patient requesting a referral to have tooth work performed.    She reports her glucose being well-controlled, with AM readings in 160s. She is taking metformin 500mg XL daily with no diarrhea.      Past Surgical History:   Procedure Laterality Date    CARPAL TUNNEL RELEASE      HERNIA REPAIR         Past Medical History:   Diagnosis Date    Asthma     Diabetes mellitus     Hypertension        Review of Systems   Constitutional: Negative for activity change, appetite change and fever.   Cardiovascular: Negative for leg swelling.   Gastrointestinal: Negative for constipation and diarrhea.   Musculoskeletal: Negative for gait problem and joint swelling.   Hematological: Does not bruise/bleed easily.   Psychiatric/Behavioral: Negative for behavioral problems, confusion and decreased concentration.       Objective:   /78 (BP Location: Right arm, Patient Position: Sitting, BP Method: Manual)   Pulse 73   Ht 5' 2" (1.575 m)   Wt 109.9 kg (242 lb 4.6 oz)   LMP 02/05/2013 (LMP Unknown)   SpO2 99%   BMI 44.31 kg/m²     Physical Exam   Constitutional: She is oriented to person, place, and time. She appears well-developed and well-nourished.   obese   HENT:   Head: Normocephalic and atraumatic.   Dental caries   Neck: Normal range of motion.   Cardiovascular: Normal rate.    Pulmonary/Chest: Effort normal.   Abdominal:   Obese abdomen   Musculoskeletal: She exhibits no edema.   Neurological: She " is alert and oriented to person, place, and time.   Psychiatric: She has a normal mood and affect. Her behavior is normal. Thought content normal.   Vitals reviewed.      Lab Results   Component Value Date    WBC 6.39 06/02/2017    HGB 12.3 06/02/2017    HCT 37.0 06/02/2017     06/02/2017    CHOL 254 (H) 04/12/2017    TRIG 84 04/12/2017    HDL 80 (H) 04/12/2017    ALT 13 06/02/2017    AST 18 06/02/2017     06/02/2017    K 4.0 06/02/2017     06/02/2017    CREATININE 0.9 06/02/2017    BUN 9 06/02/2017    CO2 29 06/02/2017    TSH 0.687 06/02/2017    INR 1.0 06/02/2017    HGBA1C 5.9 04/12/2017         Assessment:   52 y.o. female with multiple co-morbid illnesses here to continue work-up of chronic issues notably DM, HTN, HLD.     Plan:     Problem List Items Addressed This Visit        Neuro    Diabetic polyneuropathy associated with type 2 diabetes mellitus     A1c 5.9 in 1/2017, compliant with metformin 500mg daily, taking gabapentin 900mg TID  · DM foot exam abnormal  · F/U podiatry  · Continue gabapentin 300mg TID            Cardiac    Hypertension associated with diabetes     A1c 5.9 in 4/2017, BP 98/60 today, but elevated previously  · Cotniue HCTZ-lisinopril  · Electrolytes normal         Relevant Orders    Hemoglobin A1c       Renal    Uncontrolled secondary diabetes mellitus with stage 2 CKD (GFR 60-89)       ID    Dental caries     Multiple dental caries, DM, HLD  · Refer to dentist  · Fax referral to dentist office  · [facility] [place]             Endocrine    Type 2 diabetes mellitus with complication, without long-term current use of insulin     A1c 5.9 in 4/2017, compliant metformin 500mg daily, diarrhea resolved.  · Advised to cut back on high-glycemic foods  · Medical fitness consult, silver sneakers membership  · Continue metformin to XR 500mg once daily AFTER largest meal of the day   · Metformin 500mg XR after lunch  · Continue exercise  · Diabetic eye screening photo  1/2017  · No DR         Hyperlipidemia associated with type 2 diabetes mellitus     A1c 5.9 in 4/2017, compliant metformin 500mg daily (causing diarrhea),  in 1/2017  · Advised to decrease trans fats  · Continue atorvastatin 40mg  · Continue ASA 81mg  · Continue exercise and lifestyle changes  · Referral to Dr Mario for weight loss recommendations         Relevant Orders    Hemoglobin A1c       Fluids/Electrolytes/Nutrition/GI    Morbid obesity due to excess calories - Primary     BMI 44 in 4/2017 (was 49 in 1/2017), patient with DM, HTN. Losing weight through exercise and diet changes through health   · Decrease trans fats, decrease caloric intake  · No-show to health  referral  · Did not attend medical fitness program   · Not using [employer] at [place]  · Given 5:2 IER diet info  · Referral to Dr Mario for weight loss suggestions         Relevant Orders    Hemoglobin A1c    Vitamin D deficiency     Vit D level of 10 in 1/2017  · Did not tolerate high-dose vit D 50,000U due to swelling  · Intolerant of 5000U daily due to swelling  · Advised to eat more tuna, salmon, eggs, cheese, vit D- fortified foods            Other    Colon cancer screening     No history of colonoscopy  [employer] was given to patient on 7/11/2017 11:21 AM            Relevant Orders    Fecal Immunochemical Test (iFOBT)      Other Visit Diagnoses    None.         Health Maintenance       Date Due Completion Date    Pap Smear with HPV Cotest 06/30/1984 ---NEXT    Colonoscopy 06/30/2013 ---FIT KIT    TETANUS VACCINE 01/06/2016 1/6/2006    Influenza Vaccine 08/01/2017 1/5/2017    Hemoglobin A1c 10/12/2017 4/12/2017    Eye Exam 01/09/2018 1/9/2017    Lipid Panel 04/12/2018 4/12/2017    Foot Exam 06/22/2018 6/22/2017 (Done)    Override on 6/22/2017: Done    Mammogram 05/18/2019 5/18/2017    Pneumococcal PPSV23 (Medium Risk) (2) 06/30/2028 4/3/2017          Return in about 3 months (around 10/11/2017). . One hour  spent with this patient today, half of that in counseling.    Sumi Corrigan MD/MPH  Internal Medicine  [facility]  [phone]

## 2017-07-11 NOTE — PATIENT INSTRUCTIONS
TODAY:  - Attempt intermittent energy restriction diet (5:2 diet)  - check A1c today  - stool card for colon screening  - fax dentist referral   - request for appt with Dr Mario made (they will call you)    NEXT:  - talk about PAP  - flu vaccine

## 2017-07-11 NOTE — ASSESSMENT & PLAN NOTE
Vit D level of 10 in 1/2017  · Did not tolerate high-dose vit D 50,000U due to swelling  · Intolerant of 5000U daily due to swelling  · Advised to eat more tuna, salmon, eggs, cheese, vit D- fortified foods

## 2017-07-11 NOTE — ASSESSMENT & PLAN NOTE
A1c 5.9 in 4/2017, compliant metformin 500mg daily, diarrhea resolved.  · Advised to cut back on high-glycemic foods  · Medical fitness consult, silver sneakers membership  · Continue metformin to XR 500mg once daily AFTER largest meal of the day   · Metformin 500mg XR after lunch  · Continue exercise  · Diabetic eye screening photo 1/2017  · No

## 2017-07-11 NOTE — ASSESSMENT & PLAN NOTE
A1c 5.9 in 4/2017, compliant metformin 500mg daily (causing diarrhea),  in 1/2017  · Advised to decrease trans fats  · Continue atorvastatin 40mg  · Continue ASA 81mg  · Continue exercise and lifestyle changes  · Referral to Dr Mario for weight loss recommendations

## 2017-07-11 NOTE — ASSESSMENT & PLAN NOTE
BMI 44 in 4/2017 (was 49 in 1/2017), patient with DM, HTN. Losing weight through exercise and diet changes through health   · Decrease trans fats, decrease caloric intake  · No-show to health  referral  · Did not attend medical fitness program   · Not using [employer] membership at [place]  · Given 5:2 IER diet info  · Referral to Dr Mario for weight loss suggestions

## 2017-07-12 ENCOUNTER — TELEPHONE (OUTPATIENT)
Dept: INTERNAL MEDICINE | Facility: CLINIC | Age: 52
End: 2017-07-12

## 2017-07-12 NOTE — TELEPHONE ENCOUNTER
Please let patient know that her A1c continues to drop! She is 5.7 now. Please encourage her to keep up the excellent work. Slow and steady weight loss is the way to go!    Thanks,  KJ

## 2017-07-17 NOTE — TELEPHONE ENCOUNTER
Patient has been advised about her lab results , and encouraged to keep up the great work and that slow and steady  weight loss is the way to go .      Thanks Dr. Corrigan

## 2017-07-26 ENCOUNTER — TELEPHONE (OUTPATIENT)
Dept: INTERNAL MEDICINE | Facility: CLINIC | Age: 52
End: 2017-07-26

## 2017-07-26 DIAGNOSIS — E66.9 OBESITY, UNSPECIFIED OBESITY SEVERITY, UNSPECIFIED OBESITY TYPE: Primary | ICD-10-CM

## 2017-07-26 NOTE — TELEPHONE ENCOUNTER
Called patient to find out if something was wrong that she needed to see Dr. Corrigan , but patient informed me that he was calling about getting  weight-loss specialist appointment to get pills to aid in weight loss , patient is very antsy and would like to get this as son as possible.    Please advise

## 2017-07-26 NOTE — TELEPHONE ENCOUNTER
Referral for bariatric medicine placed. Please schedule her with Dr Wright in the weight loss clinic. Please advise me if there is a different referral that I need to place.    Thanks,  KJ

## 2017-07-26 NOTE — TELEPHONE ENCOUNTER
----- Message from Todd Jones sent at 7/25/2017  9:18 AM CDT -----  Contact: Self   Requesting a call back to schedule an apt, please advice    Thanks

## 2017-08-25 ENCOUNTER — TELEPHONE (OUTPATIENT)
Dept: INTERNAL MEDICINE | Facility: CLINIC | Age: 52
End: 2017-08-25

## 2017-08-25 NOTE — TELEPHONE ENCOUNTER
Patient called in and stated that she could not find a ride for today , but did request to come in on Monday morning , Patient has been scheduled and informed about compression stockings and which size to get at [facility].

## 2017-08-25 NOTE — TELEPHONE ENCOUNTER
Patient called in stating that last night she started having leg swelling in both her rt & lt legs , along with burning pain from her knees down to her toes. Patient also stated that her glucose has been in 60 , 70 range for a while .    Please advise

## 2017-08-25 NOTE — TELEPHONE ENCOUNTER
Please let her know that the compression stockings are more expensive through [facility], and she might as well go to [facility]. she can get a size that fits her leg calf circumference which is around 17 inches (I measured her at a previous appointment). She does not need to be seen for this today, unless she really wants to come.    Thanks,  ELOINA

## 2017-08-27 ENCOUNTER — HOSPITAL ENCOUNTER (EMERGENCY)
Facility: HOSPITAL | Age: 52
Discharge: HOME | End: 2017-08-27
Attending: SURGERY
Payer: MEDICARE

## 2017-08-27 DIAGNOSIS — M79.606 LEG PAIN: ICD-10-CM

## 2017-08-27 LAB
ALBUMIN SERPL BCP-MCNC: 3.7 G/DL
ALP SERPL-CCNC: 73 U/L
ALT SERPL W/O P-5'-P-CCNC: 13 U/L
ANION GAP SERPL CALC-SCNC: 11 MMOL/L
AST SERPL-CCNC: 16 U/L
BASOPHILS # BLD AUTO: 0.02 K/UL
BASOPHILS NFR BLD: 0.3 %
BILIRUB SERPL-MCNC: 0.3 MG/DL
BUN SERPL-MCNC: 18 MG/DL
CALCIUM SERPL-MCNC: 9.4 MG/DL
CHLORIDE SERPL-SCNC: 105 MMOL/L
CK SERPL-CCNC: 162 U/L
CO2 SERPL-SCNC: 25 MMOL/L
CREAT SERPL-MCNC: 0.8 MG/DL
CRP SERPL-MCNC: 5.1 MG/L
DIFFERENTIAL METHOD: ABNORMAL
EOSINOPHIL # BLD AUTO: 0.2 K/UL
EOSINOPHIL NFR BLD: 2.3 %
ERYTHROCYTE [DISTWIDTH] IN BLOOD BY AUTOMATED COUNT: 13.4 %
ERYTHROCYTE [SEDIMENTATION RATE] IN BLOOD BY WESTERGREN METHOD: 40 MM/HR
EST. GFR  (AFRICAN AMERICAN): >60 ML/MIN/1.73 M^2
EST. GFR  (NON AFRICAN AMERICAN): >60 ML/MIN/1.73 M^2
GLUCOSE SERPL-MCNC: 113 MG/DL
HCT VFR BLD AUTO: 36.9 %
HGB BLD-MCNC: 11.9 G/DL
LYMPHOCYTES # BLD AUTO: 3.9 K/UL
LYMPHOCYTES NFR BLD: 50.4 %
MCH RBC QN AUTO: 29 PG
MCHC RBC AUTO-ENTMCNC: 32.2 G/DL
MCV RBC AUTO: 90 FL
MONOCYTES # BLD AUTO: 0.3 K/UL
MONOCYTES NFR BLD: 4.2 %
NEUTROPHILS # BLD AUTO: 3.3 K/UL
NEUTROPHILS NFR BLD: 42.8 %
PLATELET # BLD AUTO: 231 K/UL
PMV BLD AUTO: 11.8 FL
POTASSIUM SERPL-SCNC: 3.9 MMOL/L
PROT SERPL-MCNC: 7.8 G/DL
RBC # BLD AUTO: 4.1 M/UL
RHEUMATOID FACT SERPL-ACNC: <10 IU/ML
SODIUM SERPL-SCNC: 141 MMOL/L
WBC # BLD AUTO: 7.7 K/UL

## 2017-08-27 PROCEDURE — 80053 COMPREHEN METABOLIC PANEL: CPT

## 2017-08-27 PROCEDURE — 86038 ANTINUCLEAR ANTIBODIES: CPT

## 2017-08-27 PROCEDURE — 63600175 PHARM REV CODE 636 W HCPCS: Performed by: SURGERY

## 2017-08-27 PROCEDURE — 99284 EMERGENCY DEPT VISIT MOD MDM: CPT | Mod: 25

## 2017-08-27 PROCEDURE — 86140 C-REACTIVE PROTEIN: CPT

## 2017-08-27 PROCEDURE — 82550 ASSAY OF CK (CPK): CPT

## 2017-08-27 PROCEDURE — 85025 COMPLETE CBC W/AUTO DIFF WBC: CPT

## 2017-08-27 PROCEDURE — 36415 COLL VENOUS BLD VENIPUNCTURE: CPT

## 2017-08-27 PROCEDURE — 96372 THER/PROPH/DIAG INJ SC/IM: CPT

## 2017-08-27 PROCEDURE — 86431 RHEUMATOID FACTOR QUANT: CPT

## 2017-08-27 PROCEDURE — 85651 RBC SED RATE NONAUTOMATED: CPT

## 2017-08-27 RX ORDER — CYCLOBENZAPRINE HCL 10 MG
10 TABLET ORAL 3 TIMES DAILY PRN
Qty: 10 TABLET | Refills: 0 | Status: SHIPPED | OUTPATIENT
Start: 2017-08-27 | End: 2017-09-01

## 2017-08-27 RX ORDER — HYDROMORPHONE HYDROCHLORIDE 1 MG/ML
1 INJECTION, SOLUTION INTRAMUSCULAR; INTRAVENOUS; SUBCUTANEOUS
Status: COMPLETED | OUTPATIENT
Start: 2017-08-27 | End: 2017-08-27

## 2017-08-27 RX ORDER — KETOROLAC TROMETHAMINE 10 MG/1
10 TABLET, FILM COATED ORAL EVERY 6 HOURS PRN
Qty: 15 TABLET | Refills: 0 | Status: SHIPPED | OUTPATIENT
Start: 2017-08-27 | End: 2018-05-03

## 2017-08-27 RX ORDER — ONDANSETRON 2 MG/ML
4 INJECTION INTRAMUSCULAR; INTRAVENOUS
Status: COMPLETED | OUTPATIENT
Start: 2017-08-27 | End: 2017-08-27

## 2017-08-27 RX ORDER — HYDROMORPHONE HYDROCHLORIDE 1 MG/ML
0.5 INJECTION, SOLUTION INTRAMUSCULAR; INTRAVENOUS; SUBCUTANEOUS
Status: DISCONTINUED | OUTPATIENT
Start: 2017-08-27 | End: 2017-08-27 | Stop reason: HOSPADM

## 2017-08-27 RX ADMIN — ONDANSETRON 4 MG: 2 INJECTION INTRAMUSCULAR; INTRAVENOUS at 01:08

## 2017-08-27 RX ADMIN — HYDROMORPHONE HYDROCHLORIDE 1 MG: 1 INJECTION, SOLUTION INTRAMUSCULAR; INTRAVENOUS; SUBCUTANEOUS at 01:08

## 2017-08-27 NOTE — ED TRIAGE NOTES
"C/o  Left leg pain x few days. Reports "pounding" pain starting in knee traveling down leg.  Hx DVT greater than 10yrs ago. Pt reports US done 4 moths ago, negative for DVT. No redness, warmth, or swelling noted.  "

## 2017-08-27 NOTE — ED PROVIDER NOTES
[facility]                                        August 27, 2017                   Chief Complaint  52 y.o. female with Leg Pain    History of Present Illness  Dai Roper presents to the emergency room with chronic left leg pain  Patient states she's had left leg pain from the knee to the ankle for several years  Patient and her passes had an ultrasound of left leg and 2011 that was negative  Patient states that she has had a DVT in the left leg in the past, workup pending  Supposed to have a ultrasound in February 2017, she missed the appointment  Patient denies any trauma or fall, has no bruising or swelling on exam this p.m.  Patient has good distal pulses and capillary refill, is neurovascular intact on exam  Patient has been told in the past the pain is likely from her diabetic neuropathy    The history is provided by the patient  Medical history: Asthma, HTN, DM, DVT  Surgical history: Carpal tunnel release and hernia repair  No Known Allergies     Review of Systems and Physical Exam     Review of Systems  -- Constitution - no fever, denies fatigue, no weakness, no chills  -- Eyes - no tearing or redness, no visual disturbance  -- Ear, Nose - no tinnitus or earache, no nasal congestion or discharge  -- Mouth,Throat - no sore throat, no toothache, normal voice, normal swallowing  -- Respiratory - denies cough and congestion, no shortness of breath, no CURRAN  -- Cardiovascular - denies chest pain, no palpitations, denies claudication  -- Gastrointestinal - denies abdominal pain, nausea, vomiting, or diarrhea  -- Musculoskeletal - pain from the knee to the foot on the left  -- Neurological - no headache, denies weakness or seizure; no LOC  -- Skin - denies pallor, rash, or changes in skin. no hives or welts noted    Vital Signs   vitals were not taken for this visit.     Physical Exam  -- Nursing note and vitals reviewed  -- Head: Atraumatic. Normocephalic. No obvious abnormality  -- Eyes:  Pupils are equal and reactive to light. Normal conjunctiva and lids  -- Cardiac: Normal rate, regular rhythm and normal heart sounds  -- Pulmonary: Normal respiratory effort, breath sounds clear to auscultation  -- Abdominal: Soft, no tenderness. Normal bowel sounds. Normal liver edge  -- Musculoskeletal: Normal range of motion, no effusions. Joints stable   -- Neurological: No focal deficits. Showed good interaction with staff  -- Vascular: Posterior tibial, dorsalis pedis and radial pulses 2+ bilaterally      Emergency Room Course     Treatment and Evaluation  -- The US of the lower extremity was negative for DVT    -- The electrolytes drawn in the ER today were within normal limits   -- The CBC drawn in the ER today was within normal limits   -- CK was normal  -- ESR and CRP are pending  -- IM 1 mg Dilaudid given today in the ER  -- IM 4 mg Zofran given today in the ER     Diagnosis  -- The encounter diagnosis was Leg pain.    Disposition and Plan  -- Disposition: home  -- Condition: stable  -- Follow-up: Patient to follow up with Sumi Corrigan MD in 1-2 days.  -- I advised the patient that we have found no life threatening condition today  -- At this time, I believe the patient is clinically stable for discharge.   -- The patient acknowledges that close follow up with a MD is required   -- Patient agrees to comply with all instruction and direction given in the ER    This note is dictated on Dragon Natural Speaking word recognition program.  There are word recognition mistakes that are occasionally missed on review.           Kirk Morales MD  08/27/17 0354

## 2017-08-27 NOTE — ED NOTES
Discharged to home/self care.    - Condition at discharge: Good  - Mode of Discharge: Wheelchair  - The patient left the ED accompanied by a family member.  - The discharge instructions were discussed with the patient.  - They state an understanding of the discharge instructions.  - Walked pt to the discharge station.

## 2017-08-28 ENCOUNTER — TELEPHONE (OUTPATIENT)
Dept: INTERNAL MEDICINE | Facility: CLINIC | Age: 52
End: 2017-08-28

## 2017-08-28 ENCOUNTER — OFFICE VISIT (OUTPATIENT)
Dept: INTERNAL MEDICINE | Facility: CLINIC | Age: 52
End: 2017-08-28
Payer: MEDICARE

## 2017-08-28 ENCOUNTER — OUTPATIENT CASE MANAGEMENT (OUTPATIENT)
Dept: ADMINISTRATIVE | Facility: OTHER | Age: 52
End: 2017-08-28

## 2017-08-28 ENCOUNTER — LAB VISIT (OUTPATIENT)
Dept: LAB | Facility: HOSPITAL | Age: 52
End: 2017-08-28
Attending: INTERNAL MEDICINE
Payer: MEDICARE

## 2017-08-28 VITALS
DIASTOLIC BLOOD PRESSURE: 60 MMHG | HEIGHT: 62 IN | HEART RATE: 81 BPM | OXYGEN SATURATION: 99 % | SYSTOLIC BLOOD PRESSURE: 109 MMHG | WEIGHT: 239 LBS | BODY MASS INDEX: 43.98 KG/M2

## 2017-08-28 DIAGNOSIS — E11.69 HYPERLIPIDEMIA ASSOCIATED WITH TYPE 2 DIABETES MELLITUS: ICD-10-CM

## 2017-08-28 DIAGNOSIS — M79.89 PAIN AND SWELLING OF LEFT LOWER LEG: ICD-10-CM

## 2017-08-28 DIAGNOSIS — E11.42 DIABETIC POLYNEUROPATHY ASSOCIATED WITH TYPE 2 DIABETES MELLITUS: ICD-10-CM

## 2017-08-28 DIAGNOSIS — M79.662 PAIN AND SWELLING OF LEFT LOWER LEG: Primary | ICD-10-CM

## 2017-08-28 DIAGNOSIS — M79.662 PAIN AND SWELLING OF LEFT LOWER LEG: ICD-10-CM

## 2017-08-28 DIAGNOSIS — F33.1 MODERATE EPISODE OF RECURRENT MAJOR DEPRESSIVE DISORDER: ICD-10-CM

## 2017-08-28 DIAGNOSIS — E78.5 HYPERLIPIDEMIA ASSOCIATED WITH TYPE 2 DIABETES MELLITUS: ICD-10-CM

## 2017-08-28 DIAGNOSIS — M79.89 PAIN AND SWELLING OF LEFT LOWER LEG: Primary | ICD-10-CM

## 2017-08-28 DIAGNOSIS — F51.04 PSYCHOPHYSIOLOGICAL INSOMNIA: ICD-10-CM

## 2017-08-28 DIAGNOSIS — E66.01 MORBID OBESITY DUE TO EXCESS CALORIES: ICD-10-CM

## 2017-08-28 DIAGNOSIS — E11.8 TYPE 2 DIABETES MELLITUS WITH COMPLICATION, WITHOUT LONG-TERM CURRENT USE OF INSULIN: ICD-10-CM

## 2017-08-28 LAB
ANA SER QL IF: NORMAL
BILIRUB UR QL STRIP: NEGATIVE
CLARITY UR REFRACT.AUTO: CLEAR
COLOR UR AUTO: YELLOW
CREAT UR-MCNC: 120 MG/DL
ERYTHROCYTE [SEDIMENTATION RATE] IN BLOOD BY WESTERGREN METHOD: 39 MM/HR
GLUCOSE UR QL STRIP: NEGATIVE
HGB UR QL STRIP: NEGATIVE
INR PPP: 0.9
KETONES UR QL STRIP: NEGATIVE
LACTATE SERPL-SCNC: 0.8 MMOL/L
LEUKOCYTE ESTERASE UR QL STRIP: NEGATIVE
MICROALBUMIN UR DL<=1MG/L-MCNC: 4 UG/ML
MICROALBUMIN/CREATININE RATIO: 3.3 UG/MG
NITRITE UR QL STRIP: NEGATIVE
PH UR STRIP: 6 [PH] (ref 5–8)
PROT UR QL STRIP: NEGATIVE
PROTHROMBIN TIME: 9.9 SEC
SP GR UR STRIP: 1.01 (ref 1–1.03)
URATE SERPL-MCNC: 5.5 MG/DL
URN SPEC COLLECT METH UR: NORMAL
UROBILINOGEN UR STRIP-ACNC: NEGATIVE EU/DL

## 2017-08-28 PROCEDURE — 96372 THER/PROPH/DIAG INJ SC/IM: CPT | Mod: S$GLB,,, | Performed by: INTERNAL MEDICINE

## 2017-08-28 PROCEDURE — 84550 ASSAY OF BLOOD/URIC ACID: CPT

## 2017-08-28 PROCEDURE — 4010F ACE/ARB THERAPY RXD/TAKEN: CPT | Mod: S$GLB,,, | Performed by: INTERNAL MEDICINE

## 2017-08-28 PROCEDURE — 3044F HG A1C LEVEL LT 7.0%: CPT | Mod: S$GLB,,, | Performed by: INTERNAL MEDICINE

## 2017-08-28 PROCEDURE — 85651 RBC SED RATE NONAUTOMATED: CPT

## 2017-08-28 PROCEDURE — 36415 COLL VENOUS BLD VENIPUNCTURE: CPT

## 2017-08-28 PROCEDURE — 83605 ASSAY OF LACTIC ACID: CPT

## 2017-08-28 PROCEDURE — 3074F SYST BP LT 130 MM HG: CPT | Mod: S$GLB,,, | Performed by: INTERNAL MEDICINE

## 2017-08-28 PROCEDURE — 3078F DIAST BP <80 MM HG: CPT | Mod: S$GLB,,, | Performed by: INTERNAL MEDICINE

## 2017-08-28 PROCEDURE — 85610 PROTHROMBIN TIME: CPT

## 2017-08-28 PROCEDURE — 99215 OFFICE O/P EST HI 40 MIN: CPT | Mod: 25,S$GLB,, | Performed by: INTERNAL MEDICINE

## 2017-08-28 PROCEDURE — 3008F BODY MASS INDEX DOCD: CPT | Mod: S$GLB,,, | Performed by: INTERNAL MEDICINE

## 2017-08-28 PROCEDURE — 99999 PR PBB SHADOW E&M-EST. PATIENT-LVL IV: CPT | Mod: PBBFAC,,, | Performed by: INTERNAL MEDICINE

## 2017-08-28 PROCEDURE — 99499 UNLISTED E&M SERVICE: CPT | Mod: S$GLB,,, | Performed by: INTERNAL MEDICINE

## 2017-08-28 RX ORDER — DULOXETIN HYDROCHLORIDE 30 MG/1
30 CAPSULE, DELAYED RELEASE ORAL DAILY
Qty: 30 CAPSULE | Refills: 0 | Status: SHIPPED | OUTPATIENT
Start: 2017-08-28 | End: 2017-10-26 | Stop reason: SDUPTHER

## 2017-08-28 RX ORDER — KETOROLAC TROMETHAMINE 30 MG/ML
60 INJECTION, SOLUTION INTRAMUSCULAR; INTRAVENOUS
Status: COMPLETED | OUTPATIENT
Start: 2017-08-28 | End: 2017-08-28

## 2017-08-28 RX ADMIN — KETOROLAC TROMETHAMINE 60 MG: 30 INJECTION, SOLUTION INTRAMUSCULAR; INTRAVENOUS at 11:08

## 2017-08-28 NOTE — ASSESSMENT & PLAN NOTE
A1c 4.7, compliant with metformin 500mg daily, taking gabapentin 300mg TID PRN  · F/U podiatry  · Continue gabapentin 300mg TID  · Discontinue metformin  · Start duloxetine
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
## 2017-08-28 NOTE — ASSESSMENT & PLAN NOTE
Wells score of 3, chronic pain/swelling since 2011 (with neg DV US at that time). Differential: DM neuropathy (A1c 5.7), chronic DVT (neg US X2 in 6/2017, 8/2017), venous insufficiency, venous stasis, nephrotic syndrome (normal kidney function), cirrhosis (normal liver function), lymphedema (no history of groin surgeries), CHF (normal echo in 1/2017)  · Check urine studies for proteinuria   · Start compression stockings  · Order size D from [employer] mag  · Check INR for liver function  · Trend ESR for inflammation  · Check uric acid for gout  · Check lactic acid for ischemia/ lactic acidosis  · Start duloxetine  · Psychology appt info given (9/13 at 11am)  · Case management referral

## 2017-08-28 NOTE — TELEPHONE ENCOUNTER
I've only prescribed lisinopril-HCTZ for her since 1/5/17. She may think her statin is for BP.    Thanks,  KJ

## 2017-08-28 NOTE — ASSESSMENT & PLAN NOTE
A1c 5.9 in 4/2017, compliant metformin 500mg daily (causing diarrhea),  in 1/2017  · Advised to decrease trans fats  · Continue atorvastatin 40mg  · Continue ASA 81mg  · Continue exercise and lifestyle changes  · Discontinue metformin  · Referral to Dr Mario for weight loss recommendations

## 2017-08-28 NOTE — ASSESSMENT & PLAN NOTE
PHQ-9 score of 15 with previous history of depression requiring medical treatment. No SI/HI, endorses caretaker burnout  · Start duloxetine  · Psychology appt information  · Case management consult

## 2017-08-28 NOTE — PATIENT INSTRUCTIONS
TODAY:  - start duloxetine, sent to [facility]  - psychology appt: [phone], 9/13 at 11am Psychology appt on 4th floor  - stop taking metformin  - order compression stockings from [employer]  - labs today  - anti-inflammatory injection in clinic (ketorolac)

## 2017-08-28 NOTE — ASSESSMENT & PLAN NOTE
Not caffeine related, good sleep hygiene (goes to bed at 9pm, wakes up at 5am)  · Psychology information given  · Start duloxetine  · Start trazodone at next appt if no improvement

## 2017-08-28 NOTE — ASSESSMENT & PLAN NOTE
BMI 44 in 4/2017 (was 49 in 1/2017), patient with DM, HTN. Losing weight through exercise and diet changes through health   · Decrease trans fats, decrease caloric intake  · No-show to health  referral  · Did not attend medical fitness program   · Not using [employer] membership at [employer]  · Given 5:2 IER diet info  · Referral to Dr Mario for weight loss suggestions
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
## 2017-08-28 NOTE — ASSESSMENT & PLAN NOTE
A1c 5.9 in 4/2017, compliant metformin 500mg daily, diarrhea resolved.  · Advised to cut back on high-glycemic foods  · Medical fitness consult, [employer] membership  · Discontinue metformin   · Encouraged to exercise  · Diabetic eye screening photo 1/2017  · No DR

## 2017-09-01 ENCOUNTER — TELEPHONE (OUTPATIENT)
Dept: INTERNAL MEDICINE | Facility: CLINIC | Age: 52
End: 2017-09-01

## 2017-09-01 NOTE — TELEPHONE ENCOUNTER
Patient has been advised  that her labs show she does not have gout, her liver function is good, the metformin was not causing lactic acidosis, but she does have a nonspecific inflammation in one lab that does not indicate a specific disease at this time.     Patient has been informed that she should proceed with what Dr. Corrigan agreed on at her last appointment- the counseling and duloxetine. Patient stated that she has been  able to start the duloxetine and is feeling much better and is looking forward to seeing Dr. Corrigan on 09/05/2017 .    Thanks Dr. Corrigan

## 2017-09-05 ENCOUNTER — OUTPATIENT CASE MANAGEMENT (OUTPATIENT)
Dept: ADMINISTRATIVE | Facility: OTHER | Age: 52
End: 2017-09-05

## 2017-09-06 ENCOUNTER — OUTPATIENT CASE MANAGEMENT (OUTPATIENT)
Dept: ADMINISTRATIVE | Facility: OTHER | Age: 52
End: 2017-09-06

## 2017-09-19 ENCOUNTER — OUTPATIENT CASE MANAGEMENT (OUTPATIENT)
Dept: ADMINISTRATIVE | Facility: OTHER | Age: 52
End: 2017-09-19

## 2017-09-19 NOTE — PROGRESS NOTES
Call placed to [phone] without an answer. Unable to leave a message. 3rd attempt to complete initial assessment for OPCM.

## 2017-10-03 ENCOUNTER — TELEPHONE (OUTPATIENT)
Dept: BARIATRICS | Facility: CLINIC | Age: 52
End: 2017-10-03

## 2017-10-26 ENCOUNTER — OFFICE VISIT (OUTPATIENT)
Dept: INTERNAL MEDICINE | Facility: CLINIC | Age: 52
End: 2017-10-26
Payer: MEDICARE

## 2017-10-26 VITALS
HEART RATE: 64 BPM | WEIGHT: 245.81 LBS | DIASTOLIC BLOOD PRESSURE: 74 MMHG | HEIGHT: 62 IN | BODY MASS INDEX: 45.24 KG/M2 | SYSTOLIC BLOOD PRESSURE: 102 MMHG

## 2017-10-26 DIAGNOSIS — E11.42 DIABETIC POLYNEUROPATHY ASSOCIATED WITH TYPE 2 DIABETES MELLITUS: ICD-10-CM

## 2017-10-26 DIAGNOSIS — F51.04 PSYCHOPHYSIOLOGICAL INSOMNIA: ICD-10-CM

## 2017-10-26 DIAGNOSIS — F33.1 MODERATE EPISODE OF RECURRENT MAJOR DEPRESSIVE DISORDER: ICD-10-CM

## 2017-10-26 DIAGNOSIS — E78.5 HYPERLIPIDEMIA ASSOCIATED WITH TYPE 2 DIABETES MELLITUS: ICD-10-CM

## 2017-10-26 DIAGNOSIS — M79.89 PAIN AND SWELLING OF LEFT LOWER LEG: ICD-10-CM

## 2017-10-26 DIAGNOSIS — I15.2 HYPERTENSION ASSOCIATED WITH DIABETES: ICD-10-CM

## 2017-10-26 DIAGNOSIS — E11.8 TYPE 2 DIABETES MELLITUS WITH COMPLICATION, WITHOUT LONG-TERM CURRENT USE OF INSULIN: ICD-10-CM

## 2017-10-26 DIAGNOSIS — E11.59 HYPERTENSION ASSOCIATED WITH DIABETES: ICD-10-CM

## 2017-10-26 DIAGNOSIS — E66.01 MORBID OBESITY DUE TO EXCESS CALORIES: ICD-10-CM

## 2017-10-26 DIAGNOSIS — J44.89 CHRONIC OBSTRUCTIVE ASTHMA: ICD-10-CM

## 2017-10-26 DIAGNOSIS — E11.69 HYPERLIPIDEMIA ASSOCIATED WITH TYPE 2 DIABETES MELLITUS: ICD-10-CM

## 2017-10-26 DIAGNOSIS — E55.9 VITAMIN D DEFICIENCY: ICD-10-CM

## 2017-10-26 DIAGNOSIS — Z00.00 ENCOUNTER FOR PREVENTIVE HEALTH EXAMINATION: Primary | ICD-10-CM

## 2017-10-26 DIAGNOSIS — M79.662 PAIN AND SWELLING OF LEFT LOWER LEG: ICD-10-CM

## 2017-10-26 PROCEDURE — 99499 UNLISTED E&M SERVICE: CPT | Mod: S$GLB,,, | Performed by: NURSE PRACTITIONER

## 2017-10-26 PROCEDURE — 99999 PR PBB SHADOW E&M-EST. PATIENT-LVL IV: CPT | Mod: PBBFAC,,, | Performed by: NURSE PRACTITIONER

## 2017-10-26 PROCEDURE — G0439 PPPS, SUBSEQ VISIT: HCPCS | Mod: S$GLB,,, | Performed by: NURSE PRACTITIONER

## 2017-10-26 NOTE — PROGRESS NOTES
"Dai Roper presented for a  Medicare AWV and comprehensive Health Risk Assessment today. The following components were reviewed and updated:    · Medical history  · Family History  · Social history  · Allergies and Current Medications  · Health Risk Assessment  · Health Maintenance  · Care Team     ** See Completed Assessments for Annual Wellness Visit within the encounter summary.**       The following assessments were completed:  · Living Situation  · CAGE  · Depression Screening  · Timed Get Up and Go  · Whisper Test  · Cognitive Function Screening  · Nutrition Screening  · ADL Screening  · PAQ Screening    Vitals:    10/26/17 0911   BP: 102/74   Pulse: 64   Weight: 111.5 kg (245 lb 13 oz)   Height: 5' 2" (1.575 m)     Body mass index is 44.96 kg/m².  Physical Exam   Constitutional: She is oriented to person, place, and time. She appears well-developed.   Obese     HENT:   Head: Normocephalic and atraumatic.   Nose: Nose normal.   Eyes: Conjunctivae and EOM are normal.   Neck: Normal range of motion. Neck supple.   Cardiovascular: Normal rate, regular rhythm, normal heart sounds and intact distal pulses.    No murmur heard.  Pulmonary/Chest: Effort normal and breath sounds normal.   Musculoskeletal: Normal range of motion.   Neurological: She is alert and oriented to person, place, and time.   Skin: Skin is warm and dry.   Psychiatric: She has a normal mood and affect. Her behavior is normal. Judgment and thought content normal.   Nursing note and vitals reviewed.        Diagnoses and health risks identified today and associated recommendations/orders:    1. Encounter for preventive health examination  Assessment performed. Health maintenance updated. Chart review completed.    2. Type 2 diabetes mellitus with complication, without long-term current use of insulin  - Ambulatory consult to Diabetic Education    3. Diabetic polyneuropathy associated with type 2 diabetes mellitus  - Ambulatory consult to " Diabetic Education  Chronic. Stable. Followed by PCP.    4. Hyperlipidemia associated with type 2 diabetes mellitus  - Ambulatory consult to Diabetic Education  Chronic. Stable. Followed by PCP.    5. Hypertension associated with diabetes  Ambulatory consult to Diabetic Education  Chronic. Stable. Followed by PCP.    6. Uncontrolled secondary diabetes mellitus with stage 2 CKD (GFR 60-89)  Ambulatory consult to Diabetic Education  Chronic. Stable. Followed by PCP.  Diabetes has been under control per recent A1Cs. Last reading 5.7.    7. Moderate episode of recurrent major depressive disorder  Chronic. Stable with current regimen. Left message with Faith (Dr DUVALL's nurse) regarding patient unable to get into be seen with psych until next year. She would like PCP to refill Cymbalta.    8. Chronic obstructive asthma  Stable with albuterol. Followed by PCP.    9. Morbid obesity due to excess calories  Chronic. Followed by Bariatric Medicine.  Referral to diabetic education today.    10. Vitamin D deficiency  Chronic. Followed by PCP.    11. Psychophysiological insomnia  Chronic. Stable with current regimen. Followed by PCP. Referral to Psychiatry placed by PCP.      Provided Dai with a 5-10 year written screening schedule and personal prevention plan. Recommendations were developed using the USPSTF age appropriate recommendations. Education, counseling, and referrals were provided as needed. After Visit Summary printed and given to patient which includes a list of additional screenings\tests needed.    Return for follow up with Primary Care Provider as instructed, ;sooner if problems, HRA in 1 year.    JIM Montelongo

## 2017-10-26 NOTE — TELEPHONE ENCOUNTER
Patient was in today with HRA and she wanted a refill on her Cymbalta due to her being completely out . Also SOURAV SutherlandP just wanted to clarify if patient should be taking metformin because its not on her med list and , patient stated that she had started back taking this medication .

## 2017-10-26 NOTE — PATIENT INSTRUCTIONS
Counseling and Referral of Other Preventative  (Italic type indicates deductible and co-insurance are waived)    Patient Name: Dai Roper  Today's Date: 10/26/2017      SERVICE LIMITATIONS RECOMMENDATION    Vaccines    · Pneumococcal (once after 65)    · Influenza (annually)    · Hepatitis B (if medium/high risk)    · Prevnar 13      Hepatitis B medium/high risk factors:       - End-stage renal disease       - Hemophiliacs who received Factor VII or         IX concentrates       - Clients of institutions for the mentally             retarded       - Persons who live in the same house as          a HepB carrier       - Homosexual men       - Illicit injectable drug abusers     Pneumococcal: Done, no repeat necessary     Influenza: Recommended to patient, declined     Hepatitis B: N/A     Prevnar 13: N/A    Mammogram (biennial age 50-74)  Annually (age 40 or over)  Done this year, repeat every year    Pap (up to age 70 and after 70 if unknown history or abnormal study last 10 years)    Done this year, repeat every year     The USPSTF recommends screening for cervical cancer in women age 21 to 65 years with cytology (Pap smear) every 3 years.     Colorectal cancer screening (to age 75)    · Fecal occult blood test (annual)  · Flexible sigmoidoscopy (5y)  · Screening colonoscopy (10y)  · Barium enema   N/A-Fitkit given by PCP    Diabetes self-management training (no USPSTF recommendations)  Requires referral by treating physician for patient with diabetes or renal disease. 10 hours of initial DSMT sessions of no less than 30 minutes each in a continuous 12-month period. 2 hours of follow-up DSMT in subsequent years.  Scheduled, see appointments    Bone mass measurements (age 65 & older, biennial)  Requires diagnosis related to osteoporosis or estrogen deficiency. Biennial benefit unless patient has history of long-term glucocorticoid  N/A    Glaucoma screening (no USPSTF recommendation)  Diabetes mellitus, family  history   , age 50 or over    American, age 65 or over  Done this year, repeat every year    Medical nutrition therapy for diabetes or renal disease (no recommended schedule)  Requires referral by treating physician for patient with diabetes or renal disease or kidney transplant within the past 3 years.  Can be provided in same year as diabetes self-management training (DSMT), and CMS recommends medical nutrition therapy take place after DSMT. Up to 3 hours for initial year and 2 hours in subsequent years.  Scheduled, see appointments    Cardiovascular screening blood tests (every 5 years)  · Fasting lipid panel  Order as a panel if possible  Done this year, repeat every year    Diabetes screening tests (at least every 3 years, Medicare covers annually or at 6-month intervals for prediabetic patients)  · Fasting blood sugar (FBS) or glucose tolerance test (GTT)  Patient must be diagnosed with one of the following:       - Hypertension       - Dyslipidemia       - Obesity (BMI 30kg/m2)       - Previous elevated impaired FBS or GTT       ... or any two of the following:       - Overweight (BMI 25 but <30)       - Family history of diabetes       - Age 65 or older       - History of gestational diabetes or birth of baby weighing more than 9 pounds  Done this year, repeat every year    HIV screening (annually for increased risk patients)  · HIV-1 and HIV-2 by EIA, or GAVINO, rapid antibody test or oral mucosa transudate  Patients must be at increased risk for HIV infection per USPSTF guidelines or pregnant. Tests covered annually for patient at increased risk or as requested by the patient. Pregnant patients may receive up to 3 tests during pregnancy.  Risks discussed, screening is not recommended    Smoking cessation counseling (up to 8 sessions per year)  Patients must be asymptomatic of tobacco-related conditions to receive as a preventative service.  Non-smoker    Subsequent annual wellness  visit  At least 12 months since last AWV  Return in one year     The following information is provided to all patients.  This information is to help you find resources for any of the problems found today that may be affecting your health:                Living healthy guide: www.[facility].[place].[facility]      Understanding Diabetes: www.diabetes.org      Eating healthy: www.cdc.gov/healthyweight      [facility] home safety checklist: www.cdc.gov/steadi/patient.html      Agency on Aging: www.goea.[place].[facility]      Alcoholics anonymous (AA): www.aa.org      Physical Activity: www.lianne.nih.gov/im5thjk      Tobacco use: www.quitwithusla.org

## 2017-10-27 RX ORDER — DULOXETIN HYDROCHLORIDE 30 MG/1
30 CAPSULE, DELAYED RELEASE ORAL DAILY
Qty: 90 CAPSULE | Refills: 3 | Status: SHIPPED | OUTPATIENT
Start: 2017-10-27 | End: 2017-11-06 | Stop reason: SDUPTHER

## 2017-10-27 NOTE — TELEPHONE ENCOUNTER
Cymbalta sent to patient's [facility] in [place], [place].    She can take metformin, as it will help her prevent diabetes and loose weight. Is she having any diarrhea symptoms with it?    Thanks,  KJ

## 2017-11-06 ENCOUNTER — IMMUNIZATION (OUTPATIENT)
Dept: INTERNAL MEDICINE | Facility: CLINIC | Age: 52
End: 2017-11-06
Payer: MEDICARE

## 2017-11-06 ENCOUNTER — OFFICE VISIT (OUTPATIENT)
Dept: INTERNAL MEDICINE | Facility: CLINIC | Age: 52
End: 2017-11-06
Payer: MEDICARE

## 2017-11-06 ENCOUNTER — CLINICAL SUPPORT (OUTPATIENT)
Dept: DIABETES | Facility: CLINIC | Age: 52
End: 2017-11-06
Payer: MEDICARE

## 2017-11-06 ENCOUNTER — DOCUMENTATION ONLY (OUTPATIENT)
Dept: PHARMACY | Facility: CLINIC | Age: 52
End: 2017-11-06

## 2017-11-06 VITALS
HEIGHT: 62 IN | SYSTOLIC BLOOD PRESSURE: 109 MMHG | WEIGHT: 246.94 LBS | OXYGEN SATURATION: 99 % | DIASTOLIC BLOOD PRESSURE: 78 MMHG | BODY MASS INDEX: 45.44 KG/M2 | HEART RATE: 79 BPM

## 2017-11-06 DIAGNOSIS — E11.8 TYPE 2 DIABETES MELLITUS WITH COMPLICATION, WITHOUT LONG-TERM CURRENT USE OF INSULIN: ICD-10-CM

## 2017-11-06 DIAGNOSIS — M79.662 PAIN AND SWELLING OF LEFT LOWER LEG: ICD-10-CM

## 2017-11-06 DIAGNOSIS — E66.01 MORBID OBESITY DUE TO EXCESS CALORIES: ICD-10-CM

## 2017-11-06 DIAGNOSIS — E11.42 DIABETIC POLYNEUROPATHY ASSOCIATED WITH TYPE 2 DIABETES MELLITUS: ICD-10-CM

## 2017-11-06 DIAGNOSIS — M79.89 PAIN AND SWELLING OF LEFT LOWER LEG: ICD-10-CM

## 2017-11-06 DIAGNOSIS — E11.59 HYPERTENSION ASSOCIATED WITH DIABETES: ICD-10-CM

## 2017-11-06 DIAGNOSIS — E55.9 VITAMIN D DEFICIENCY: ICD-10-CM

## 2017-11-06 DIAGNOSIS — E11.69 HYPERLIPIDEMIA ASSOCIATED WITH TYPE 2 DIABETES MELLITUS: ICD-10-CM

## 2017-11-06 DIAGNOSIS — F33.1 MODERATE EPISODE OF RECURRENT MAJOR DEPRESSIVE DISORDER: ICD-10-CM

## 2017-11-06 DIAGNOSIS — I15.2 HYPERTENSION ASSOCIATED WITH DIABETES: ICD-10-CM

## 2017-11-06 DIAGNOSIS — K02.9 DENTAL CARIES: ICD-10-CM

## 2017-11-06 DIAGNOSIS — E78.5 HYPERLIPIDEMIA ASSOCIATED WITH TYPE 2 DIABETES MELLITUS: ICD-10-CM

## 2017-11-06 PROCEDURE — 90686 IIV4 VACC NO PRSV 0.5 ML IM: CPT | Mod: S$GLB,,, | Performed by: INTERNAL MEDICINE

## 2017-11-06 PROCEDURE — G0008 ADMIN INFLUENZA VIRUS VAC: HCPCS | Mod: S$GLB,,, | Performed by: INTERNAL MEDICINE

## 2017-11-06 PROCEDURE — 99499 UNLISTED E&M SERVICE: CPT | Mod: S$GLB,,, | Performed by: INTERNAL MEDICINE

## 2017-11-06 PROCEDURE — G0108 DIAB MANAGE TRN  PER INDIV: HCPCS | Mod: S$GLB,,, | Performed by: INTERNAL MEDICINE

## 2017-11-06 PROCEDURE — 99999 PR PBB SHADOW E&M-EST. PATIENT-LVL IV: CPT | Mod: PBBFAC,,, | Performed by: INTERNAL MEDICINE

## 2017-11-06 PROCEDURE — 99215 OFFICE O/P EST HI 40 MIN: CPT | Mod: S$GLB,,, | Performed by: INTERNAL MEDICINE

## 2017-11-06 PROCEDURE — 99999 PR PBB SHADOW E&M-EST. PATIENT-LVL II: CPT | Mod: PBBFAC,,,

## 2017-11-06 RX ORDER — GABAPENTIN 300 MG/1
300 CAPSULE ORAL 3 TIMES DAILY
Qty: 90 CAPSULE | Refills: 3 | Status: SHIPPED | OUTPATIENT
Start: 2017-11-06

## 2017-11-06 RX ORDER — LISINOPRIL AND HYDROCHLOROTHIAZIDE 12.5; 2 MG/1; MG/1
TABLET ORAL
COMMUNITY
Start: 2017-09-06 | End: 2017-11-06 | Stop reason: SDUPTHER

## 2017-11-06 RX ORDER — IBUPROFEN 600 MG/1
TABLET ORAL
COMMUNITY
Start: 2017-09-22 | End: 2018-05-03

## 2017-11-06 RX ORDER — DULOXETIN HYDROCHLORIDE 30 MG/1
30 CAPSULE, DELAYED RELEASE ORAL DAILY
Qty: 90 CAPSULE | Refills: 3 | Status: SHIPPED | OUTPATIENT
Start: 2017-11-06 | End: 2017-12-07 | Stop reason: DRUGHIGH

## 2017-11-06 RX ORDER — ATORVASTATIN CALCIUM 40 MG/1
40 TABLET, FILM COATED ORAL DAILY
Qty: 90 TABLET | Refills: 3 | Status: SHIPPED | OUTPATIENT
Start: 2017-11-06 | End: 2018-12-21 | Stop reason: SDUPTHER

## 2017-11-06 RX ORDER — LISINOPRIL AND HYDROCHLOROTHIAZIDE 12.5; 2 MG/1; MG/1
1 TABLET ORAL DAILY
Qty: 90 TABLET | Refills: 3 | Status: SHIPPED | OUTPATIENT
Start: 2017-11-06 | End: 2018-01-19

## 2017-11-06 RX ORDER — NAPROXEN SODIUM 220 MG/1
81 TABLET, FILM COATED ORAL DAILY
Qty: 90 TABLET | Refills: 3 | Status: SHIPPED | OUTPATIENT
Start: 2017-11-06 | End: 2018-12-22 | Stop reason: SDUPTHER

## 2017-11-06 NOTE — ASSESSMENT & PLAN NOTE
Multiple dental caries, DM, HLD  · Refer to dentist  · Patient assisted with establishing care with [facility]  · PCP faxed eferral to dentist office at previous appt  · [facility] [place]   · Patient reports never hearing back

## 2017-11-06 NOTE — ASSESSMENT & PLAN NOTE
A1c 5.7 in 7/2017, compliant metformin 500mg daily, LDL>70  · Advised to decrease trans fats  · Continue atorvastatin 40mg  · Continue ASA 81mg  · Continue exercise and lifestyle changes  · Restart metformin  · Referral to Dr Mario for weight loss recommendations  · No-show to previous appt  · Rescheduled for 12/2017

## 2017-11-06 NOTE — ASSESSMENT & PLAN NOTE
A1c 5.7, compliant with metformin 500mg daily, taking gabapentin 300mg TID PRN  · F/U podiatry  · Continue gabapentin 300mg TID  · Continue metformin  · Continue duloxetine

## 2017-11-06 NOTE — PROGRESS NOTES
Diabetes Education  Author: June Prasad RD, CDE  Date: 11/6/2017    Diabetes Education Visit  Diabetes Education Record Assessment/Progress: Initial (Referred by HRA)    Diabetes Type  Diabetes Type : Type II    Diabetes History  Diabetes Diagnosis: >10 years    Nutrition  Meal Planning: skipping meals, snacks between meal, water, drinks regular soda, eats out often (May have a regular sprite once every 4 days)  What type of sweetener do you use?: none  What type of beverages do you drink?: other (see comments) (Coffee w/ a little cream 2 days a week)    Monitoring   Self Monitoring :  (Checks BID)    Exercise   Exercise Type: none    Current Diabetes Treatment   Current Treatment: Oral Medication (Metformin - states starting back on Metformin; had stopped it, but reports BGs back up to 200s since stopping it)    Social History  Preferred Learning Method: Face to Face  Primary Support: Self  Smoking Status: Never a Smoker  Alcohol Use: Never    DDS-2 Score  ( > 3 = SIGNIFICANT DISTRESS): 2.5    Barriers to Change  Barriers to Change: None  Learning Challenges : None    Readiness to Learn   Readiness to Learn : Acceptance    Cultural Influences  Cultural Influences: No    Diabetes Education Assessment/Progress  Diabetes Disease Process (diabetes disease process and treatment options): Instructed, Discussion, Individual Session, Written Materials Provided  Nutrition (Incorporating nutritional management into one's lifestyle): Instructed, Discussion, Individual Session, Written Materials Provided  Physical Activity (incorporating physical activity into one's lifestyle): Instructed, Discussion, Individual Session, Written Materials Provided  Medications (states correct name, dose, onset, peak, duration, side effects & timing of meds): Instructed, Discussion, Individual Session, Written Materials Provided  Monitoring (monitoring blood glucose/other parameters & using results): Instructed, Discussion, Individual Session,  Written Materials Provided  Acute Complications (preventing, detecting, and treating acute complications): Instructed, Discussion, Individual Session, Written Materials Provided  Chronic Complications (preventing, detecting, and treating chronic complications): Instructed, Discussion, Individual Session, Written Materials Provided  Clinical (diabetes and other pertinent medical history): Instructed, Discussion, Individual Session  Cognitive (knowledge of self-management skills, functional health literacy): Instructed, Discussion, Individual Session  Psychosocial (emotional response to diabetes): Instructed, Discussion, Individual Session  Diabetes Distress and Support Systems: Instructed, Discussion, Individual Session  Behavioral (readiness for change, lifestyle practices, self-care behaviors): Instructed, Discussion, Individual Session    Goals  Patient has selected/evaluated goals during today's session: No    Diabetes Care Plan/Intervention  Education Plan/Intervention: Individual Follow-Up DSMT (Follow up 1 year; sooner if needed - phone number to clinic provided)    Diabetes Meal Plan  Carbohydrate Per Meal: 30-45g  Carbohydrate Per Snack : 7-15g    Education Units of Time   Time Spent: 30 min      Health Maintenance Topics with due status: Not Due       Topic Last Completion Date    Pap Smear with HPV Cotest 05/11/2016    Pneumococcal PPSV23 (Medium Risk) 04/03/2017    Lipid Panel 04/12/2017    Eye Exam 05/11/2017    Mammogram 05/18/2017    Foot Exam 06/22/2017    Hemoglobin A1c 07/11/2017     Health Maintenance Due   Topic Date Due    Colonoscopy  06/30/2013    TETANUS VACCINE  01/06/2016    Influenza Vaccine  08/01/2017

## 2017-11-06 NOTE — PROGRESS NOTES
"Primary Care Provider Appointment    Subjective:      Patient ID: Dai Roper is a 52 y.o. female with DM, MDD, COPD, HLD    Chief Complaint: Follow-up (Patient is here for a over all check up ) and Medication Refill (Patient stated that she needs a re fill of cymbalta (Thats she gets for an outside source))    Patient with many questions about the color of her tablets. She reports they were previously green and now they are tan. She does not understand why her med color changes. She endorses compliance with lisinopril 20mg- HCTZ 12.5mg, gabapentin 300mg TID, atorva 40mg, ASA. She is out of cymbalta (she had refills at her pharmacy but did not pick it up). She previously she stopped taking her metformin, but her glucose increased to 200mg.     Her weight is up today, she endorses eating unhealthy foods, noncompliance with lifestyle changes and exercise.    She reports numerous ED visits for nonspecific leg swelling, has no-showed to ambulatory work-up appointments. Is seeing "[facility]" for mental health care. She has not shown up for an appt, is confused about the care she is getting there.    During exam she received multiple phone calls from an "online class" she is taking.     Past Surgical History:   Procedure Laterality Date    CARPAL TUNNEL RELEASE      HERNIA REPAIR      TUBAL LIGATION         Past Medical History:   Diagnosis Date    Asthma     Diabetes mellitus     DVT (deep venous thrombosis)     Hypertension        Review of Systems   Constitutional: Positive for activity change and appetite change. Negative for fever.   Cardiovascular: Positive for leg swelling.   Gastrointestinal: Negative for constipation and diarrhea.   Musculoskeletal: Positive for arthralgias, joint swelling and myalgias.   Hematological: Does not bruise/bleed easily.   Psychiatric/Behavioral: Positive for decreased concentration, dysphoric mood and sleep disturbance. Negative for behavioral problems and " "confusion. The patient is nervous/anxious.        Objective:   /78 (BP Location: Right arm, Patient Position: Sitting, BP Method: Medium (Automatic))   Pulse 79   Ht 5' 2" (1.575 m)   Wt 112 kg (246 lb 14.6 oz)   LMP 02/05/2013 (LMP Unknown)   SpO2 99%   BMI 45.16 kg/m²     Physical Exam   Constitutional: She is oriented to person, place, and time. She appears well-developed and well-nourished.   obese   HENT:   Head: Normocephalic and atraumatic.   Dental caries   Neck: Normal range of motion.   Cardiovascular: Normal rate.    Pulmonary/Chest: Effort normal.   Abdominal:   Obese abdomen   Musculoskeletal: She exhibits no edema.   Neurological: She is alert and oriented to person, place, and time.   Psychiatric: She has a normal mood and affect. Her behavior is normal. Thought content normal.   Vitals reviewed.      Lab Results   Component Value Date    WBC 7.70 08/27/2017    HGB 11.9 (L) 08/27/2017    HCT 36.9 (L) 08/27/2017     08/27/2017    CHOL 254 (H) 04/12/2017    TRIG 84 04/12/2017    HDL 80 (H) 04/12/2017    ALT 13 08/27/2017    AST 16 08/27/2017     08/27/2017    K 3.9 08/27/2017     08/27/2017    CREATININE 0.8 08/27/2017    BUN 18 08/27/2017    CO2 25 08/27/2017    TSH 0.687 06/02/2017    INR 0.9 08/28/2017    HGBA1C 5.7 (H) 07/11/2017         Assessment:   52 y.o. female with multiple co-morbid illnesses here to continue work-up of chronic issues notably DM, MDD, COPD, HLD     Plan:     Problem List Items Addressed This Visit        Neuro    Diabetic polyneuropathy associated with type 2 diabetes mellitus     A1c 5.7, compliant with metformin 500mg daily, taking gabapentin 300mg TID PRN  · F/U podiatry  · Continue gabapentin 300mg TID  · Continue metformin  · Continue duloxetine         Relevant Medications    DULoxetine (CYMBALTA) 30 MG capsule       Psychiatric    Moderate episode of recurrent major depressive disorder     PHQ-9 score of 15 with previous history of " depression requiring medical treatment. No SI/HI, endorses caretaker burnout  · Continue duloxetine  · Continue psychiatric care at [facility]         Relevant Medications    DULoxetine (CYMBALTA) 30 MG capsule    Other Relevant Orders    Ambulatory referral to Outpatient Case Management    Ambulatory consult to Psychology       ENT    Dental caries     Multiple dental caries, DM, HLD  · Refer to dentist  · Patient assisted with establishing care with [facility]  · PCP faxed eferral to dentist office at previous appt  · [facility] in [place]   · Patient reports never hearing back            Cardiac/Vascular    Hyperlipidemia associated with type 2 diabetes mellitus     A1c 5.7 in 7/2017, compliant metformin 500mg daily, LDL>70  · Advised to decrease trans fats  · Continue atorvastatin 40mg  · Continue ASA 81mg  · Continue exercise and lifestyle changes  · Restart metformin  · Referral to Dr Mario for weight loss recommendations  · No-show to previous appt  · Rescheduled for 12/2017         Relevant Medications    atorvastatin (LIPITOR) 40 MG tablet    aspirin 81 MG Chew    Hypertension associated with diabetes     A1c 5.7 in 7/2017,variable BP, but elevated previously  · Cotniue HCTZ-lisinopril  · Verify dose with [facility] pharmacy, patient confused about dosing  · Electrolytes normal         Relevant Medications    aspirin 81 MG Chew    lisinopril-hydrochlorothiazide (PRINZIDE,ZESTORETIC) 20-12.5 mg per tablet       Endocrine    Morbid obesity due to excess calories     BMI 44 in 4/2017 (was 49 in 1/2017), patient with DM, HTN. Losing weight through exercise and diet changes through health   · Decrease trans fats, decrease caloric intake  · No-show to health  referral  · Did not attend medical fitness program   · Not using [employer] membership at [place]  · Given 5:2 IER diet info  · Referral to Dr Mario for weight loss suggestions  · Patient no-show  · Diabetic ed class today         Relevant  Orders    Ambulatory referral to Outpatient Case Management    Ambulatory consult to Psychology    Type 2 diabetes mellitus with complication, without long-term current use of insulin    Relevant Medications    gabapentin (NEURONTIN) 300 MG capsule    aspirin 81 MG Chew    Other Relevant Orders    Ambulatory referral to Outpatient Case Management    Vitamin D deficiency     Vit D level of 10 in 1/2017  · Did not tolerate high-dose vit D 50,000U due to swelling  · Intolerant of 5000U daily due to swelling  · Advised to eat more tuna, salmon, eggs, cheese, vit D- fortified foods            Orthopedic    Pain and swelling of left lower leg     Wells score of 3, chronic pain/swelling since 2011 (with neg DV US at that time). Differential: DM neuropathy, chronic DVT, venous insufficiency, venous stasis  · US of LE to eval for DVT scheduled in 2/2017  · Patient no-show  · Repeat study neg  · Pain resolved   · Continue gabapentin         Relevant Medications    DULoxetine (CYMBALTA) 30 MG capsule          Health Maintenance       Date Due Completion Date    Colonoscopy 06/30/2013 ---    TETANUS VACCINE 01/06/2016 1/6/2006    Influenza Vaccine 08/01/2017 1/5/2017- TODAY    Hemoglobin A1c 01/11/2018 7/11/2017    Lipid Panel 04/12/2018 4/12/2017    Eye Exam 05/11/2018 5/11/2017    Foot Exam 06/22/2018 6/22/2017 (Done)    Override on 6/22/2017: Done    Pap Smear with HPV Cotest 05/11/2019 5/11/2016 (Done)    Override on 5/11/2016: Done (normal PAP at [facility])    Mammogram 05/18/2019 5/18/2017    Pneumococcal PPSV23 (Medium Risk) (2) 06/30/2028 4/3/2017          Return in about 4 weeks (around 12/4/2017). One hour spent with this patient today, half of that in counseling.    Sumi Corrigan MD/MPH  Internal Medicine  [facility]  [phone]

## 2017-11-06 NOTE — ASSESSMENT & PLAN NOTE
BMI 44 in 4/2017 (was 49 in 1/2017), patient with DM, HTN. Losing weight through exercise and diet changes through health   · Decrease trans fats, decrease caloric intake  · No-show to health  referral  · Did not attend medical fitness program   · Not using [employer] membership at [place]  · Given 5:2 IER diet info  · Referral to Dr Mario for weight loss suggestions  · Patient no-show  · Diabetic ed class today

## 2017-11-06 NOTE — ASSESSMENT & PLAN NOTE
A1c 5.7 in 7/2017,variable BP, but elevated previously  · Cotniue HCTZ-lisinopril  · Verify dose with [facility] pharmacy, patient confused about dosing  · Electrolytes normal

## 2017-11-06 NOTE — PATIENT INSTRUCTIONS
TODAY:  - PCP to verify with [facility] pharmacy what the dose of lisinopril- HCTZ she is taking  - get meds filled at [facility] pharmacy and pack into bubble packing today  -  meds once monthly from pharmacy  -  meds today after diabetes education  - take all meds after eating breakfast  - attend upcoming mammogram and weight loss clinic appointment  - [facility] app[facility]  - psychology appt set up today  - flu vaccine

## 2017-11-06 NOTE — PROGRESS NOTES
Two patient identifiers addressed, allergies reviewed. Flu Vaccine ordered per Stefani ARREDONDO  and vaccine verified. Flu vaccine administered to right deltoid. Patient  tolerated injection well; no swelling, redness, or bruising noted at injection site. Patient advised to remain in clinic 15 minutes following injection for observation,verbalizes understanding.

## 2017-11-06 NOTE — ASSESSMENT & PLAN NOTE
PHQ-9 score of 15 with previous history of depression requiring medical treatment. No SI/HI, endorses caretaker burnout  · Continue duloxetine  · Continue psychiatric care at [facility]

## 2017-11-06 NOTE — ASSESSMENT & PLAN NOTE
Wells score of 3, chronic pain/swelling since 2011 (with neg DV US at that time). Differential: DM neuropathy, chronic DVT, venous insufficiency, venous stasis  · US of LE to eval for DVT scheduled in 2/2017  · Patient no-show  · Repeat study neg  · Pain resolved   · Continue gabapentin

## 2017-11-07 ENCOUNTER — OUTPATIENT CASE MANAGEMENT (OUTPATIENT)
Dept: ADMINISTRATIVE | Facility: OTHER | Age: 52
End: 2017-11-07

## 2017-11-07 NOTE — PROGRESS NOTES
Adherence packed for 30 days on 11/06/2017.  Repackaging due on 12/06/2017.    Morning:  Gabapentin 300mg  Lisinopril-HCTZ 20-12.5mg  *Patient got a 90 day supply of Lisinopril-HCTZ 20-12.5mg filled on 09/06/2017 @ [facility]    Lunch:  Gabapentin 300mg    Evening:  Gabapentin 300mg  Duloxetine 30mg  Atorvastatin 40mg    Forwarded patient #30 Atorvastatin 40mg and #30 Lisinopril-HCTZ 20-12.5mg. Patient has ASA 81mg and Metformin at home. She did not want either of these meds included into her medication packets at this time. She is unsure as to which strength the Metformin is...Patient instructed to bring ALL of her medications in, at her next visit in December to be reviewed by pharmacy and Dr. DUVALL. Metformin and ASA 81mg should be added into the patient's adherence packages next month if appropriate.

## 2017-11-20 ENCOUNTER — OUTPATIENT CASE MANAGEMENT (OUTPATIENT)
Dept: ADMINISTRATIVE | Facility: OTHER | Age: 52
End: 2017-11-20

## 2017-11-28 ENCOUNTER — OUTPATIENT CASE MANAGEMENT (OUTPATIENT)
Dept: ADMINISTRATIVE | Facility: OTHER | Age: 52
End: 2017-11-28

## 2017-12-06 ENCOUNTER — OUTPATIENT CASE MANAGEMENT (OUTPATIENT)
Dept: ADMINISTRATIVE | Facility: OTHER | Age: 52
End: 2017-12-06

## 2017-12-06 NOTE — PROGRESS NOTES
Attempt to contact letter will be sent as 3rd attempt for initial assessment OPCM. Will close the case at this time.

## 2017-12-07 ENCOUNTER — DOCUMENTATION ONLY (OUTPATIENT)
Dept: INTERNAL MEDICINE | Facility: CLINIC | Age: 52
End: 2017-12-07

## 2017-12-07 ENCOUNTER — OFFICE VISIT (OUTPATIENT)
Dept: INTERNAL MEDICINE | Facility: CLINIC | Age: 52
End: 2017-12-07
Payer: MEDICARE

## 2017-12-07 ENCOUNTER — OFFICE VISIT (OUTPATIENT)
Dept: PSYCHIATRY | Facility: CLINIC | Age: 52
End: 2017-12-07
Payer: MEDICARE

## 2017-12-07 VITALS
HEART RATE: 89 BPM | DIASTOLIC BLOOD PRESSURE: 72 MMHG | HEIGHT: 62 IN | OXYGEN SATURATION: 96 % | SYSTOLIC BLOOD PRESSURE: 120 MMHG | WEIGHT: 243.63 LBS | BODY MASS INDEX: 44.83 KG/M2

## 2017-12-07 DIAGNOSIS — I15.2 HYPERTENSION ASSOCIATED WITH DIABETES: ICD-10-CM

## 2017-12-07 DIAGNOSIS — F51.04 PSYCHOPHYSIOLOGICAL INSOMNIA: ICD-10-CM

## 2017-12-07 DIAGNOSIS — E66.01 MORBID OBESITY DUE TO EXCESS CALORIES: ICD-10-CM

## 2017-12-07 DIAGNOSIS — F33.1 MODERATE EPISODE OF RECURRENT MAJOR DEPRESSIVE DISORDER: ICD-10-CM

## 2017-12-07 DIAGNOSIS — E11.59 HYPERTENSION ASSOCIATED WITH DIABETES: ICD-10-CM

## 2017-12-07 DIAGNOSIS — F41.1 GAD (GENERALIZED ANXIETY DISORDER): ICD-10-CM

## 2017-12-07 DIAGNOSIS — Z12.11 COLON CANCER SCREENING: ICD-10-CM

## 2017-12-07 DIAGNOSIS — E11.8 TYPE 2 DIABETES MELLITUS WITH COMPLICATION, WITHOUT LONG-TERM CURRENT USE OF INSULIN: ICD-10-CM

## 2017-12-07 DIAGNOSIS — F33.1 MDD (MAJOR DEPRESSIVE DISORDER), RECURRENT EPISODE, MODERATE: Primary | ICD-10-CM

## 2017-12-07 DIAGNOSIS — G47.00 INSOMNIA, UNSPECIFIED TYPE: ICD-10-CM

## 2017-12-07 PROCEDURE — 99212 OFFICE O/P EST SF 10 MIN: CPT | Mod: PBBFAC,25 | Performed by: SOCIAL WORKER

## 2017-12-07 PROCEDURE — 99999 PR PBB SHADOW E&M-EST. PATIENT-LVL III: CPT | Mod: PBBFAC,,, | Performed by: INTERNAL MEDICINE

## 2017-12-07 PROCEDURE — 90791 PSYCH DIAGNOSTIC EVALUATION: CPT | Mod: S$GLB,,, | Performed by: SOCIAL WORKER

## 2017-12-07 PROCEDURE — 99215 OFFICE O/P EST HI 40 MIN: CPT | Mod: S$GLB,,, | Performed by: INTERNAL MEDICINE

## 2017-12-07 PROCEDURE — 90791 PSYCH DIAGNOSTIC EVALUATION: CPT | Mod: PBBFAC | Performed by: SOCIAL WORKER

## 2017-12-07 PROCEDURE — 99499 UNLISTED E&M SERVICE: CPT | Mod: S$PBB,,, | Performed by: INTERNAL MEDICINE

## 2017-12-07 PROCEDURE — 99999 PR PBB SHADOW E&M-EST. PATIENT-LVL II: CPT | Mod: PBBFAC,,, | Performed by: SOCIAL WORKER

## 2017-12-07 RX ORDER — METFORMIN HYDROCHLORIDE 500 MG/1
500 TABLET, EXTENDED RELEASE ORAL 2 TIMES DAILY WITH MEALS
Qty: 180 TABLET | Refills: 3 | Status: SHIPPED | OUTPATIENT
Start: 2017-12-07 | End: 2018-11-09 | Stop reason: SDUPTHER

## 2017-12-07 RX ORDER — DULOXETIN HYDROCHLORIDE 60 MG/1
60 CAPSULE, DELAYED RELEASE ORAL DAILY
Qty: 90 CAPSULE | Refills: 3 | Status: SHIPPED | OUTPATIENT
Start: 2017-12-07 | End: 2018-04-03 | Stop reason: SDUPTHER

## 2017-12-07 NOTE — ASSESSMENT & PLAN NOTE
A1c 5.9 in 4/2017, compliant metformin 500mg daily, diarrhea resolved.  · Advised to cut back on high-glycemic foods  · Medical fitness consult, [employer] membership  · Patient restarted metformin 500 BID   · Add to bubble pack today  · STRONGLY encouraged to exercise  · Diabetic eye screening photo 1/2017  · No DR

## 2017-12-07 NOTE — PROGRESS NOTES
Psychiatry Initial Visit (PhD/LCSW)  Diagnostic Interview - CPT 25639    Date: 12/7/2017    Site: [facility]    Referral source: MD    Clinical status of patient: Outpatient    Dai Roper, a 52 y.o. female, for initial evaluation visit.  Met with patient.    Chief complaint/reason for encounter: depression, mood swings, anxiety, sleep, appetite, behavior, cognition, somatic and interpersonal    History of present illness: has depression and anxiety, not sleeping and the anxiety and depression run in her family, and she seen her PCP who sent her here for psychiatry and therapy.    Pain: 5    Symptoms:   · Mood: depressed mood, diminished interest, weight gain, insomnia, fatigue, worthlessness/guilt, poor concentration, tearfulness and social isolation  · Anxiety: decreased memory, excessive anxiety/worry, restlessness/keyed up, irritability and muscle tension  · Substance abuse: denied  · Cognitive functioning: denied  · Health behaviors: noncontributory    Psychiatric history: weight issues, diabetes, high blood pressure, depression, anxiety, not sleeping and issues with her appetite, pain.    Medical history: see the above, pain in her lags, diabetes since age 16.    Family history of psychiatric illness: diabetes, depression and anxiety all run in her family    Social history (marriage, employment, etc.): lives with grand daughter age 13 who attends school in middle school, 7th grade doing okay, few friends, and some family, and attends [employer] regularly or so, and has mental health and medical issues and is in pain, and here upon referral from her PCP for MD consultation and ongoing therapy.    Substance use:   Alcohol: none   Drugs: none   Tobacco: none   Caffeine: some    Current medications and drug reactions (include OTC, herbal): see medication list see med list    Strengths and liabilities: Strength: Patient accepts guidance/feedback, Strength: Patient is expressive/articulate., Strength:  Patient is intelligent., Strength: Patient is motivated for change., Strength: Patient is physically healthy., Strength: Patient has positive support network., Strength: Patient has reasonable judgment., Strength: Patient is stable., Liability: Patient is dependent., Liability: Patient lacks social skills., Liability: Patient has poor health., Liability: Patient has poor judgment, Liability: Patient is unstable., Liability: Patient lacks coping skills.    Current Evaluation:     Mental Status Exam:  General Appearance:  unremarkable, age appropriate   Speech: normal tone, normal rate, normal pitch, normal volume      Level of Cooperation: cooperative      Thought Processes: normal and logical   Mood: angry, anxious, depressed, irritable, sad      Thought Content: normal, no suicidality, no homicidality, delusions, or paranoia   Affect: congruent and appropriate   Orientation: Oriented x3   Memory: recent >  intact, remote >  intact   Attention Span & Concentration: intact   Fund of General Knowledge: intact and appropriate to age and level of education   Abstract Reasoning: interpretation of similarities was concrete   Judgment & Insight: limited     Language  intact     Diagnostic Impression - Plan:       ICD-10-CM ICD-9-CM   1. MDD (major depressive disorder), recurrent episode, moderate F33.1 296.32   2. LANIE (generalized anxiety disorder) F41.1 300.02   3. Insomnia, unspecified type G47.00 780.52       Plan:individual psychotherapy, group psychotherapy, consult psychiatrist for medication evaluation and medication management by physician    Return to Clinic: 2 weeks    Length of Service (minutes): 45

## 2017-12-07 NOTE — ASSESSMENT & PLAN NOTE
No history of colonoscopy  FitKit was given to patient in 7/2017   Never turned in  FitKit was given to patient again on 12/7/2017 11:25 AM

## 2017-12-07 NOTE — PROGRESS NOTES
Primary Care Provider Appointment    Subjective:      Patient ID: Dai Roper is a 52 y.o. female with MDD, HLD, DM, obesity    Chief Complaint: Follow-up (Patient is here for a 4 week follow up ); Insomnia (Patient stated that the Cymbalta was working but , she thinks that her body got used to it and it is no longer working .); and Medication Management (Pill pack needs to be refilled .)    Patient is concerned about her insomnia. She goes to bed at 8pm, wakes up at 10pm, goes back to sleep at 3am and stays in bed until 6am (to put her granddaughter on the bus). She does not go back to bed. She drinks one cup of caffienated coffee per week, one soda per week, no tea, rare chocolate. Her main beverage is water. She does not work, but reads a book and is sedentary during the day. She rarely walks outside, does not exercise. 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 She is under the stress of her son having the police after him due to probation violation.    She is compliant with bubble pack meds. Her current duloxetine dose is 30mg, requesting increase.    She restarted metformin on her own, and has no diarrhea. She states her previous sugars were in 200s, but last A1c was 5.7.    She never turined in her fitkit from 7/2017.    Past Surgical History:   Procedure Laterality Date    CARPAL TUNNEL RELEASE      HERNIA REPAIR      TUBAL LIGATION         Past Medical History:   Diagnosis Date    Asthma     Diabetes mellitus     DVT (deep venous thrombosis)     Hypertension        Review of Systems   Constitutional: Positive for activity change and appetite change. Negative for fever.   Cardiovascular: Positive for leg swelling.   Gastrointestinal: Negative for constipation and diarrhea.   Musculoskeletal: Positive for arthralgias, joint swelling and myalgias.   Hematological: Does not bruise/bleed easily.   Psychiatric/Behavioral: Positive for decreased concentration, dysphoric mood and sleep  "disturbance. Negative for behavioral problems and confusion. The patient is nervous/anxious.        Objective:   /72 (BP Location: Left arm, Patient Position: Sitting, BP Method: Medium (Manual))   Pulse 89   Ht 5' 2" (1.575 m)   Wt 110.5 kg (243 lb 9.7 oz)   LMP 02/05/2013 (LMP Unknown)   SpO2 96%   BMI 44.56 kg/m²     Physical Exam   Constitutional: She is oriented to person, place, and time. She appears well-developed and well-nourished.   obese   HENT:   Head: Normocephalic and atraumatic.   Dental caries   Neck: Normal range of motion.   Cardiovascular: Normal rate.    Pulmonary/Chest: Effort normal.   Abdominal:   Obese abdomen   Musculoskeletal: She exhibits no edema.   Neurological: She is alert and oriented to person, place, and time.   Psychiatric: She has a normal mood and affect. Her behavior is normal. Thought content normal.   Vitals reviewed.      Lab Results   Component Value Date    WBC 7.70 08/27/2017    HGB 11.9 (L) 08/27/2017    HCT 36.9 (L) 08/27/2017     08/27/2017    CHOL 254 (H) 04/12/2017    TRIG 84 04/12/2017    HDL 80 (H) 04/12/2017    ALT 13 08/27/2017    AST 16 08/27/2017     08/27/2017    K 3.9 08/27/2017     08/27/2017    CREATININE 0.8 08/27/2017    BUN 18 08/27/2017    CO2 25 08/27/2017    TSH 0.687 06/02/2017    INR 0.9 08/28/2017    HGBA1C 5.7 (H) 07/11/2017         Assessment:   52 y.o. female with multiple co-morbid illnesses here to continue work-up of chronic issues notably MDD, HLD, DM, obesity     Plan:     Problem List Items Addressed This Visit        Psychiatric    Moderate episode of recurrent major depressive disorder     PHQ-9 score of 15 with previous history of depression requiring medical treatment. No SI/HI, endorses caretaker burnout  · Increase duloxetine to 60mg  · Take in morning due to insomnia complaints  · Continue psychiatric care at [facility]  · Start psychology treatment in [facility]  · appt today at 12pm         Relevant Medications    " DULoxetine (CYMBALTA) 60 MG capsule       Cardiac/Vascular    Hypertension associated with diabetes     A1c 5.7 in 7/2017,variable BP, but elevated previously  · Cotniue HCTZ-lisinopril  · Electrolytes normal         Relevant Medications    metFORMIN (GLUCOPHAGE-XR) 500 MG 24 hr tablet       Endocrine    Morbid obesity due to excess calories     BMI 44 in 4/2017 (was 49 in 1/2017), patient with DM, HTN. Losing weight through exercise and diet changes through health   · Decrease trans fats, decrease caloric intake  · No-show to health  referral  · Did not attend medical fitness program   · Not using [employer] membership at [place]  · Given 5:2 IER diet info  · Referral to Dr Mario for weight loss suggestions  · Patient no-show  · Diabetic ed class completed  · Restarted metformin         Relevant Medications    metFORMIN (GLUCOPHAGE-XR) 500 MG 24 hr tablet    Type 2 diabetes mellitus with complication, without long-term current use of insulin     A1c 5.9 in 4/2017, compliant metformin 500mg daily, diarrhea resolved.  · Advised to cut back on high-glycemic foods  · Medical fitness consult, silver [employer] membership  · Patient restarted metformin 500 BID   · Add to bubble pack today  · STRONGLY encouraged to exercise  · Diabetic eye screening photo 1/2017  · No DR         Relevant Medications    metFORMIN (GLUCOPHAGE-XR) 500 MG 24 hr tablet    Other Relevant Orders    Ambulatory Referral to Optometry       GI    Colon cancer screening     No history of colonoscopy  FitKit was given to patient in 7/2017   Never turned in  FitKit was given to patient again on 12/7/2017 11:25 AM            Relevant Orders    Fecal Immunochemical Test (iFOBT)       Other    Psychophysiological insomnia     Not caffeine related, good sleep hygiene (goes to bed at 9pm, wakes up at 5am)  · Psychology information given  · Increase duloxetine to 60mg  · Change time to morning dose  · Start trazodone at next appt if no  improvement               Health Maintenance       Date Due Completion Date    Colonoscopy 06/30/2013 ---iFOBT    TETANUS VACCINE 01/06/2016 1/6/2006    Hemoglobin A1c 01/11/2018 7/11/2017    Lipid Panel 04/12/2018 4/12/2017    Eye Exam 05/11/2018 5/11/2017    Foot Exam 06/22/2018 6/22/2017 (Done)    Override on 6/22/2017: Done    Pap Smear with HPV Cotest 05/11/2019 5/11/2016 (Done)    Override on 5/11/2016: Done (normal PAP at [facility])    Mammogram 05/18/2019 5/18/2017    Pneumococcal PPSV23 (Medium Risk) (2) 06/30/2028 4/3/2017          Return in about 4 weeks (around 1/4/2018). . One hour spent with this patient today, half of that in counseling.    Sumi Corrigan MD/MPH  Internal Medicine  [facility]  [phone]

## 2017-12-07 NOTE — ASSESSMENT & PLAN NOTE
PHQ-9 score of 15 with previous history of depression requiring medical treatment. No SI/HI, endorses caretaker burnout  · Increase duloxetine to 60mg  · Take in morning due to insomnia complaints  · Continue psychiatric care at [facility]  · Start psychology treatment in [facility]  · appt today at 12pm

## 2017-12-07 NOTE — ASSESSMENT & PLAN NOTE
BMI 44 in 4/2017 (was 49 in 1/2017), patient with DM, HTN. Losing weight through exercise and diet changes through health   · Decrease trans fats, decrease caloric intake  · No-show to health  referral  · Did not attend medical fitness program   · Not using [employer] membership at [place]  · Given 5:2 IER diet info  · Referral to Dr Mario for weight loss suggestions  · Patient no-show  · Diabetic ed class completed  · Restarted metformin

## 2017-12-07 NOTE — PATIENT INSTRUCTIONS
TODAY:  - increase duloxetine to 60mg   + take in the morning  - restart metformin 500mg twice daily  - exercise EVERY day    + 30-60min walk daily  - stool test for blood   + mail it in

## 2017-12-07 NOTE — ASSESSMENT & PLAN NOTE
Not caffeine related, good sleep hygiene (goes to bed at 9pm, wakes up at 5am)  · Psychology information given  · Increase duloxetine to 60mg  · Change time to morning dose  · Start trazodone at next appt if no improvement

## 2017-12-07 NOTE — ASSESSMENT & PLAN NOTE
A1c 5.7 in 7/2017,variable BP, but elevated previously  · Cotniue HCTZ-lisinopril  · Electrolytes normal

## 2017-12-14 ENCOUNTER — TELEPHONE (OUTPATIENT)
Dept: BARIATRICS | Facility: CLINIC | Age: 52
End: 2017-12-14

## 2017-12-14 ENCOUNTER — DOCUMENTATION ONLY (OUTPATIENT)
Dept: PHARMACY | Facility: CLINIC | Age: 52
End: 2017-12-14

## 2017-12-14 ENCOUNTER — OUTPATIENT CASE MANAGEMENT (OUTPATIENT)
Dept: ADMINISTRATIVE | Facility: OTHER | Age: 52
End: 2017-12-14

## 2017-12-14 NOTE — TELEPHONE ENCOUNTER
----- Message from Erika Dewitt sent at 12/14/2017  9:15 AM CST -----  Contact: Pt  Pt is calling to speak with nurse in regards to care and can be reached at [phone].    Thank you

## 2017-12-14 NOTE — PROGRESS NOTES
Pt called and reports that she received the failure to contact letter from this cm. Pt reports that her telephone is giving her problems and that is the reason for not being able to contact her. This cm asked if she could be called right back. Reports sure with telephone number of [phone] given to call back. CM called pt back at the telephone number given per pt's request with message left containing contact information.

## 2017-12-14 NOTE — PROGRESS NOTES
Adherence packed for 30 days on 12/07/2017.  Repackaging due on 01/04/2018.     Morning:  Gabapentin 300mg  Lisinopril-HCTZ 20-12.5mg  Duloxetine 30mg x2  Metformin ER 500mg    Lunch:  Gabapentin 300mg     Evening:  Metformin ER 500mg  Gabapentin 300mg  Atorvastatin 40mg     ASA 81mg should be added into the patient's adherence packages next month.

## 2017-12-14 NOTE — TELEPHONE ENCOUNTER
----- Message from Adriana Villa sent at 12/14/2017 10:28 AM CST -----  Contact: self   Dai States that she needs a call returned by a nurse in [facility] Please call  Dai @ [phone]  . Thanks :)

## 2017-12-15 ENCOUNTER — HOSPITAL ENCOUNTER (OUTPATIENT)
Dept: RADIOLOGY | Facility: HOSPITAL | Age: 52
Discharge: HOME | End: 2017-12-15
Attending: INTERNAL MEDICINE
Payer: MEDICARE

## 2017-12-15 VITALS — BODY MASS INDEX: 44.72 KG/M2 | HEIGHT: 62 IN | WEIGHT: 243 LBS

## 2017-12-15 DIAGNOSIS — R92.8 ABNORMAL MAMMOGRAM: ICD-10-CM

## 2017-12-15 PROCEDURE — 77065 DX MAMMO INCL CAD UNI: CPT | Mod: 26,LT,, | Performed by: RADIOLOGY

## 2017-12-15 PROCEDURE — 77061 BREAST TOMOSYNTHESIS UNI: CPT | Mod: TC,PO,LT

## 2017-12-15 PROCEDURE — 77061 BREAST TOMOSYNTHESIS UNI: CPT | Mod: 26,LT,, | Performed by: RADIOLOGY

## 2017-12-21 ENCOUNTER — OFFICE VISIT (OUTPATIENT)
Dept: OPTOMETRY | Facility: CLINIC | Age: 52
End: 2017-12-21
Payer: MEDICARE

## 2017-12-21 DIAGNOSIS — H25.13 NUCLEAR SCLEROTIC CATARACT OF BOTH EYES: ICD-10-CM

## 2017-12-21 DIAGNOSIS — Z79.84 LONG TERM CURRENT USE OF ORAL HYPOGLYCEMIC DRUG: ICD-10-CM

## 2017-12-21 DIAGNOSIS — E11.9 TYPE 2 DIABETES MELLITUS WITHOUT RETINOPATHY: Primary | ICD-10-CM

## 2017-12-21 DIAGNOSIS — H04.123 BILATERAL DRY EYES: ICD-10-CM

## 2017-12-21 DIAGNOSIS — H52.203 HYPEROPIA WITH ASTIGMATISM AND PRESBYOPIA, BILATERAL: ICD-10-CM

## 2017-12-21 DIAGNOSIS — H52.03 HYPEROPIA WITH ASTIGMATISM AND PRESBYOPIA, BILATERAL: ICD-10-CM

## 2017-12-21 DIAGNOSIS — H52.4 HYPEROPIA WITH ASTIGMATISM AND PRESBYOPIA, BILATERAL: ICD-10-CM

## 2017-12-21 PROCEDURE — 99999 PR PBB SHADOW E&M-EST. PATIENT-LVL II: CPT | Mod: PBBFAC,,, | Performed by: OPTOMETRIST

## 2017-12-21 PROCEDURE — 92015 DETERMINE REFRACTIVE STATE: CPT | Mod: S$GLB,,, | Performed by: OPTOMETRIST

## 2017-12-21 PROCEDURE — 92004 COMPRE OPH EXAM NEW PT 1/>: CPT | Mod: S$GLB,,, | Performed by: OPTOMETRIST

## 2017-12-21 PROCEDURE — 99499 UNLISTED E&M SERVICE: CPT | Mod: S$PBB,,, | Performed by: OPTOMETRIST

## 2017-12-21 NOTE — Clinical Note
Dear Dr. Corrigan,  Thank you for referring Ms. Roper for a diabetic eye examination; there is no retinopathy and I will continue to monitor yearly. Please let me know if you have questions.  Sincerely, Lesli Martínez OD

## 2017-12-21 NOTE — PROGRESS NOTES
HPI     MsDale Roper was referred by Sumi Corrigan MD for a   diabetic eye exam.    Patient complains of eyes feeling scratchy, tearing, and quick sharp   stinging in OU for 1 week.    Pt initially reports distance blur OD with glasses x 3-6 months. However,   after reading eye chart during today's exam she notes that blurred vision   is actually in OS. She requests refraction today.     (-)drops  (-)flashes  (-)floaters  (-)diplopia     Diabetic yes   Hemoglobin A1C       Date                     Value               Ref Range             Status                07/11/2017               5.7 (H)             4.0 - 5.6 %           Final                 04/12/2017               5.9                 4.5 - 6.2 %           Final                 01/03/2017               6.0                 4.5 - 6.2 %           Final                OCULAR HISTORY  Last Eye Exam 1 years ago at [facility]   Diabetic eye screening photos: 01/09/17: No BDR and mild cataract OU.  (-)eye surgery   (-)diagnosed or treated for any eye conditions or diseases none     FAMILY HISTORY  (-)Glaucoma none       Last edited by Lesli Martínez, OD on 12/21/2017 12:21 PM. (History)            Assessment /Plan     For exam results, see Encounter Report.    Type 2 diabetes mellitus without retinopathy  Long term current use of oral hypoglycemic drug   No retinopathy noted OU. Continue management of DM as directed by PCP. Monitor with DFE in 1 year, or RTC immediately with any vision changes.     Nuclear sclerotic cataract of both eyes   Not visually significant OU. Monitor.      Hyperopia with astigmatism and presbyopia, bilateral   Increase in astigmatism OU. New glasses prescription released, adaptation expected.    Eyeglass Final Rx     Eyeglass Final Rx       Sphere Cylinder Axis Dist VA Add    Right +1.75 +1.00 005 20/20 +2.25    Left +1.75 +1.50 010 20/20-1 +2.25    Expiration Date:  12/22/2018            Bilateral dry  eyes   Symptomatic, and causing fluctuating vision during exam today. Recommended artificial tears BID OU.          RTC 1 year

## 2017-12-21 NOTE — LETTER
December 21, 2017      Sumi Corrigan MD  [address]  [facility] [address]           [facility]  [address]  [facility] [address]  Phone: [phone]          Patient: Dai Roper   MR Number: [id number]   YOB: 1965   Date of Visit: 12/21/2017       Dear Dr. Sumi Corrigan:    Thank you for referring Dai Roper to me for evaluation. Attached you will find relevant portions of my assessment and plan of care.    If you have questions, please do not hesitate to call me. I look forward to following Dai Roper along with you.    Sincerely,    Lesli Martínez, OD    Enclosure  CC:  No Recipients    If you would like to receive this communication electronically, please contact [email or web address] or [phone] to request more information on [employer] access.    For providers and/or their staff who would like to refer a patient to [facility], please contact us through our one-stop-shop provider referral line, [facility], at [phone].    If you feel you have received this communication in error or would no longer like to receive these types of communications, please e-mail [email or web address]

## 2017-12-21 NOTE — PATIENT INSTRUCTIONS
CATARACT    Symptoms and Signs:  A cataract starts out small, and at first has little effect on your vision. You may notice that your vision is blurred a little, like looking through a cloudy piece of glass or viewing an impressionist painting. A cataract may make light from the sun or a lamp seem too bright or glaring. Or you may notice when you drive at night that the oncoming headlights cause more glare than before. Colors may not appear as bright as they once did.  The type of cataract you have will affect exactly which symptoms you experience and how soon they will occur. When a nuclear cataract first develops it can bring about a temporary improvement in your near vision, called second sight. Unfortunately, the improved vision is short-lived and will disappear as the cataract worsens. Meanwhile, a sub-capsular cataract may not produce any symptoms until it's well-developed.    Causes:  No one knows for sure why the eye's lens changes as we age, forming cataracts. Researchers are gradually identifying factors that may cause cataracts - and information that may help to prevent them.  Many studies suggest that exposure to ultraviolet light is associated with cataracts, so eye care practitioners recommend wearing sunglasses and a wide-brimmed hat to lessen your exposure.  Other studies suggest people with diabetes are at risk for developing a cataract.   Some eye care practitioners believe that a diet high in antioxidants, such as beta-carotene (vitamin A), selenium and vitamins C and E, may forestall cataracts.  The most important of these is probably vitamin C; it might be helpful to supplement the diet with an extra Vitamin C tablet.  Meanwhile, eating a lot of salt may increase your risk.  Other risk factors include cigarette smoke, air pollution and heavy alcohol consumption.  We simply recommend that you be careful to use sunglasses and to take Vitamin C.    Treatment:  When symptoms begin to appear, we can  improve your vision for a while using new glasses, strong bifocals, magnification, appropriate lighting or other visual aids.  This is true in your case; your cataract does not impact your vision very much at this time. If you experience any of the symptoms we described you can return at any time. Otherwise it is fine to see you in 1 year.

## 2017-12-27 NOTE — PROGRESS NOTES
Subjective:       Patient ID: Dai Roper is a 52 y.o. female.    Chief Complaint: No chief complaint on file.    CC: Help with weight loss.     Current attempts at weight loss: New pt to me, referred by Sumi Corrigan MD  [address] [place] [address] , with Patient Active Problem List:     Morbid obesity due to excess calories     Chronic obstructive asthma     Type 2 diabetes mellitus with complication, without long-term current use of insulin     Hyperlipidemia associated with type 2 diabetes mellitus     Hypertension associated with diabetes     Diabetic polyneuropathy associated with type 2 diabetes mellitus     Pain and swelling of left lower leg     Uncontrolled secondary diabetes mellitus with stage 2 CKD (GFR 60-89)- dx > 10 years ago.      Vitamin D deficiency     Colon cancer screening     Dental caries     Insomnia     MDD (major depressive disorder), recurrent episode, moderate     LANIE (generalized anxiety disorder)     No exercise. Has cut back some foods.     Lab Results       Component                Value               Date                       HGBA1C                   5.7 (H)             07/11/2017                 HGBA1C                   5.9                 04/12/2017                 HGBA1C                   6.0                 01/03/2017            Lab Results       Component                Value               Date                       LDLCALC                  157.2               04/12/2017                 CREATININE               0.8                 08/27/2017              Previous diet attempts: Has been to diabetic education     History of medication for loss: Denies.   checked today     Heaviest weight: 252 #    Lightest weight: 160#    Goal weight: 165#    Health Maintenance       Date Due Completion Date    Colonoscopy 06/30/2013 ---    TETANUS VACCINE 01/06/2016 1/6/2006    Hemoglobin A1c 01/11/2018 7/11/2017    Lipid Panel 04/12/2018 4/12/2017    Foot  "Exam 06/22/2018 6/22/2017 (Done)    Override on 6/22/2017: Done    Eye Exam 12/21/2018 12/21/2017    Pap Smear with HPV Cotest 05/11/2019 5/11/2016 (Done)    Override on 5/11/2016: Done (normal PAP at [facility])    Mammogram 12/15/2019 12/15/2017    Pneumococcal PPSV23 (Medium Risk) (2) 06/30/2028 4/3/2017          Typical eating patterns:  Has part-time job. Lives with 12 yo grand daughter. They cook together.   Breakfast: May skip. Grits, eggs and toast. Weekends: same.     Lunch: soup- vegetable. Bowlegs- ham with light correa.     Dinner: half cup rice, beans and chicken, Weekends: same.     Snacks: pickle. Cookies.     Beverages: water, OJ.     Willingness to change: 9/10    EKG:Normal sinus rhythm  Normal ECG  When compared with ECG of 17-OCT-2015 08:53,  Nonspecific T wave abnormality, improved in Inferior leads    BMR: 1645        Review of Systems   Constitutional: Negative for chills and fever.   Respiratory: Positive for shortness of breath.         Denies Snoring. Asthma   Cardiovascular: Positive for leg swelling. Negative for chest pain.   Gastrointestinal: Negative for constipation and diarrhea.        Denies GERD   Genitourinary: Negative for difficulty urinating and dysuria.   Musculoskeletal: Negative for arthralgias and back pain.   Neurological: Negative for dizziness and light-headedness.   Psychiatric/Behavioral: Negative for dysphoric mood. The patient is nervous/anxious.         Controlled on meds currently.        Objective:     /68   Pulse 66   Ht 5' 2" (1.575 m)   Wt 109.1 kg (240 lb 8.4 oz)   LMP 02/05/2013 (LMP Unknown)   BMI 43.99 kg/m²     Physical Exam   Constitutional: She is oriented to person, place, and time. She appears well-developed. No distress.   Morbidly obese     HENT:   Head: Normocephalic and atraumatic.   Eyes: EOM are normal. Pupils are equal, round, and reactive to light. No scleral icterus.   Neck: Normal range of motion. Neck supple. No thyromegaly present. "   Cardiovascular: Normal rate and normal heart sounds.  Exam reveals no gallop and no friction rub.    No murmur heard.  Pulmonary/Chest: Effort normal and breath sounds normal. No respiratory distress. She has no wheezes.   Abdominal: Soft. Bowel sounds are normal. She exhibits no distension. There is no tenderness.   Musculoskeletal: Normal range of motion. She exhibits no edema.   Neurological: She is alert and oriented to person, place, and time. No cranial nerve deficit.   Skin: Skin is warm and dry. No erythema.   Psychiatric: She has a normal mood and affect. Her behavior is normal. Judgment normal.   Vitals reviewed.      Assessment:       1. Morbid obesity with BMI of 40.0-44.9, adult    2. Type 2 diabetes mellitus with complication, without long-term current use of insulin    3. Uncontrolled secondary diabetes mellitus with stage 2 CKD (GFR 60-89)    4. Hypertension associated with diabetes    5. Hyperlipidemia associated with type 2 diabetes mellitus    6. LANIE (generalized anxiety disorder)        Plan:         1. Morbid obesity with BMI of 40.0-44.9, adult  Discussed both medical and surgical options with pt.     Exercise 20 min a day.These can be cardiovascular, body weight exercises, light weight or elastic bands. Voddler and VocalZoom are great sources for free work out plans and videos.     Meal ideas and healthy all day tips given.     3 meals a day made up of the following:  Unlimited green vegetables, tomatoes, mushrooms, spaghetti squash, cauliflower, meat, poultry, seafood, eggs and hard cheeses.   Milk and plain yogurt  Dressings, seasonings, condiments, etc should have less than 2 g sugars.   beans or nuts can have 1 x a day.   1-2 servings of citrus fruits, berries, pineapple or melon a day (1/2 cup)  Avoid fried foods    Limit to 3 times a week:  grains, rice, pasta, potatoes, bread, corn, peas, oatmeal, grits, tortillas, crackers, chips    No soda, sweet tea, juices or  lemonade    [email or web address] are family too for recipes. Moderate carb intake  2. Type 2 diabetes mellitus with complication, without long-term current use of insulin  Pt advised to check blood sugar regularly as medications may need to be adjusted with changes in diet and weight loss.  Expect improvement with weight loss.     3. Uncontrolled secondary diabetes mellitus with stage 2 CKD (GFR 60-89)      4. Hypertension associated with diabetes  Expect improvement with weight loss. The current medical regimen is effective;  continue present plan and medications.     5. Hyperlipidemia associated with type 2 diabetes mellitus  Expect improvement with weight loss. Recheck after 10% TBW lost.      6. LANIE (generalized anxiety disorder)  Avoid stimulant anorectics     Patient was informed that topiramate is used for migraine prevention and seizures. Weight loss is a common side effect that is well documented. She understands this. She was informed of the potential side effects such as serious and possibly fatal rash in which case the medication should be discontinued immediately. Paresthesias, forgetfulness, fatigue, kidney stones, GI symptoms, and changes in lab values such as electrolytes, blood counts and kidney function.    Start topiramate  in the evening for 1 week, then morning and evening.

## 2017-12-28 ENCOUNTER — INITIAL CONSULT (OUTPATIENT)
Dept: BARIATRICS | Facility: CLINIC | Age: 52
End: 2017-12-28
Payer: MEDICARE

## 2017-12-28 VITALS
SYSTOLIC BLOOD PRESSURE: 120 MMHG | WEIGHT: 240.5 LBS | DIASTOLIC BLOOD PRESSURE: 68 MMHG | HEART RATE: 66 BPM | BODY MASS INDEX: 44.26 KG/M2 | HEIGHT: 62 IN

## 2017-12-28 DIAGNOSIS — E11.8 TYPE 2 DIABETES MELLITUS WITH COMPLICATION, WITHOUT LONG-TERM CURRENT USE OF INSULIN: ICD-10-CM

## 2017-12-28 DIAGNOSIS — I15.2 HYPERTENSION ASSOCIATED WITH DIABETES: ICD-10-CM

## 2017-12-28 DIAGNOSIS — F41.1 GAD (GENERALIZED ANXIETY DISORDER): ICD-10-CM

## 2017-12-28 DIAGNOSIS — E78.5 HYPERLIPIDEMIA ASSOCIATED WITH TYPE 2 DIABETES MELLITUS: ICD-10-CM

## 2017-12-28 DIAGNOSIS — E11.69 HYPERLIPIDEMIA ASSOCIATED WITH TYPE 2 DIABETES MELLITUS: ICD-10-CM

## 2017-12-28 DIAGNOSIS — E11.59 HYPERTENSION ASSOCIATED WITH DIABETES: ICD-10-CM

## 2017-12-28 DIAGNOSIS — E66.01 MORBID OBESITY WITH BMI OF 40.0-44.9, ADULT: Primary | ICD-10-CM

## 2017-12-28 PROCEDURE — 99999 PR PBB SHADOW E&M-EST. PATIENT-LVL III: CPT | Mod: PBBFAC,,, | Performed by: INTERNAL MEDICINE

## 2017-12-28 PROCEDURE — 99499 UNLISTED E&M SERVICE: CPT | Mod: S$PBB,,, | Performed by: INTERNAL MEDICINE

## 2017-12-28 PROCEDURE — 99215 OFFICE O/P EST HI 40 MIN: CPT | Mod: S$GLB,,, | Performed by: INTERNAL MEDICINE

## 2017-12-28 RX ORDER — TOPIRAMATE 50 MG/1
50 TABLET, FILM COATED ORAL 2 TIMES DAILY
Qty: 60 TABLET | Refills: 3 | Status: SHIPPED | OUTPATIENT
Start: 2017-12-28 | End: 2018-01-27

## 2017-12-28 NOTE — PATIENT INSTRUCTIONS
Patient was informed that topiramate is used for migraine prevention and seizures. Weight loss is a common side effect that is well documented. She understands this. She was informed of the potential side effects such as serious and possibly fatal rash in which case the medication should be discontinued immediately. Paresthesias, forgetfulness, fatigue, kidney stones, GI symptoms, and changes in lab values such as electrolytes, blood counts and kidney function.    Start topiramate  in the evening for 1 week, then morning and evening.     Exercise 20 min a day.These can be cardiovascular, body weight exercises, light weight or elastic bands. [email or web address].[email or web address] and eReceipts are great sources for free work out plans and videos.       3 meals a day made up of the following:  Unlimited green vegetables, tomatoes, mushrooms, spaghetti squash, cauliflower, meat, poultry, seafood, eggs and hard cheeses.   Milk and plain yogurt  Dressings, seasonings, condiments, etc should have less than 2 g sugars.   beans or nuts can have 1 x a day.   1-2 servings of citrus fruits, berries, pineapple or melon a day (1/2 cup)  Avoid fried foods    Limit to 3 times a week:  grains, rice, pasta, potatoes, bread, corn, peas, oatmeal, grits, tortillas, crackers, chips    No soda, sweet tea, juices or lemonade    [email or web address] for recipes. Moderate carb intake    Meal Ideas for Regular Bariatric Diet  *Recipes and products available at [email or web address]      Breakfast: (15-20g protein)    - Egg white omelet: 2 egg whites or ½ cup Egg Beaters. (Optional proteins: cheese, shrimp, black beans, chicken, sliced turkey) (Optional veggies: tomatoes, salsa, spinach, mushrooms, onions, green peppers, or small slice avocado)     - Egg and sausage: 1 egg or ¼ cup Egg Beaters (any variety), with 1 amaya or 2 links of Turkey sausage or Veggie breakfast sausage ([employer] or [employer])    - Crust-less breakfast quiche: To make a glass pie dish, mix 4oz  part skim Ricotta, 1 cup skim milk, and 2 eggs as your base. Add protein: shredded cheese, sliced lean ham or turkey, turkey gonzalez/sausage. Add veggies: tomato, onion, green onion, mushroom, green pepper, spinach, etc.    - Yogurt parfait: Mix 1 - 6oz container Dannon Light N Fit vanilla yogurt, with ¼ cup Kashi Go Lean cereal    - Cottage cheese and fruit: ½ cup part-skim cottage cheese or ricotta cheese topped with fresh fruit or sugar free preserves     - Nayla Alicea's Vanilla Egg custard* (add 2 Tbsp instant coffee granules to make Cappuccino Custard*)    - Hi-Protein café latte (skim milk, decaf coffee, 1 scoop protein powder). Optional to add Sugar free syrup or extract flavoring.    Lunch: (20-30g protein)    - ½ cup Black bean soup (Homemade or Progresso), with ¼ cup shredded low-fat cheese. Top with chopped tomato or fresh salsa.     - Lean deli turkey breast and low-fat sliced cheese, mustard or light correa to moisten, rolled up together, or wrapped in a Gustavo lettuce leaf    - Chicken salad made from dinner leftovers, moisten with low-fat salad dressing or light correa. Also try leftover salmon, shrimp, tuna or boiled eggs. Serve ½ cup over dark green salad    - Fat-free canned refried beans, topped with ¼ cup shredded low-fat cheese. Top with chopped tomato or fresh salsa.     - Greek salad: Top mixed greens with 1-2oz grilled chicken, tomatoes, red onions, 2-3 kalamata olives, and sprinkle lightly with feta cheese. Spritz with Balsamic vinegar to taste.     - Crust-less lunch quiche: To make a glass pie dish, mix 4oz part skim Ricotta, 1 cup skim milk, and 2 eggs as your base. Add protein: shredded cheese, sliced lean ham or turkey, shrimp, chicken. Add veggies: tomato, onion, green onion, mushroom, green pepper, spinach, artichoke, broccoli, etc.    - Pizza bake: tomato sauce, low-fat shredded mozzarella and turkey pepperoni or [place] gonzalez. Add any veggies.    - Cucumber crab bites: Spread ¼ cup  crab dip (lump crabmeat + light cream cheese and green onions) over sliced cucumber.     - Chicken with light spinach and artichoke dip*: Puree in : 6oz cooked and drained spinach, 2 cloves garlic, 1 can cannelloni beans, ½ cup chopped green onions, 1 can drained artichoke hearts (not marinated in oil), lemon juice and basil. Mix in 2oz chopped up chicken.    Supper: (20-30g protein)    - Serve grilled fish over dark green salad tossed with low-fat dressing, served with grilled asparagus kline     - Rotisserie chicken salad: served with sliced strawberries, walnuts, fat-free feta cheese crumbles and 1 tbsp Shays Own Light Raspberry [place] Vinaigrette    - Shrimp cocktail: Dip cold boiled shrimp in homemade low-sugar cocktail sauce (1/2 cup Willis One Carb ketchup, 2 tbsp horseradish, 1/4 tsp hot sauce, 1 tsp Worcestershire sauce, 1 tbsp freshly-squeezed lemon juice). Serve with dark green salad, walnuts, and crumbled blue cheese drizzled with olive oil and Balsamic vinegar    - Tuna Melt: Spread tuna salad onto 2 thick slices of tomato. Top with low-fat cheese and broil until cheese is melted. May also be made with chicken salad of shrimp salad. [place] with different types of cheeses.    - Homemade low-fat Chili using extra lean ground beef or ground turkey. Top with shredded cheese and salsa as desired. May add dollop fat-free sour cream if desired    - Dinner Omelet with shrimp or chicken and onion, green peppers and chives.    - No noodle lasagna: Use sliced zucchini or eggplant in place of noodles.  Layer with part skim ricotta cheese and low sugar meat sauce (use very lean ground beef or ground turkey).    - Mexican chicken bake: Bake chunks of chicken breast or thigh with taco seasoning, Pace brand enchilada sauce, green onions and low-fat cheese. Serve with ¼ cup black beans or fat free refried beans topped with chopped tomatoes or salsa.    - Joseph frozen meatballs, simmered in  Classico Marinara sauce. Different flavors of salsa or spaghetti sauce create different dishes! Sprinkle with parmesan cheese. Serve with grilled or steamed veggies, or a dark green salad.    - Simmer boneless skinless chicken thigh chunks in Classico Marinara sauce or roasted salsa until tender with chopped onion, bell pepper, garlic, mushrooms, spinach, etc.     - Hamburger, without the bun, dressed the way you like. Served with grilled or steamed veggies.    - Eggplant parmesan: Bake slices of eggplant at 350 degrees for 15 minutes. Layer tomato sauce, sliced eggplant and low-fat mozzarella cheese in a baking dish and cover with foil. Bake 30-40 more minutes or until bubbly. Uncover and bake at 400 degrees for about 15 more minutes, or until top is slightly crisp.    - Fish tacos: grilled/baked white fish, wrapped in Gustavo lettuce leaf, topped with salsa, shredded low-fat cheese, and light coleslaw.    Snacks: (100-200 calories; >5g protein)    - 1 low-fat cheese stick with 8 cherry tomatoes or 1 serving fresh fruit  - 4 thin slices fat-free turkey breast and 1 slice low-fat cheese  - 4 thin slices fat-free honey ham with wedge of melon  - 1/4 cup unsalted nuts with ½ cup fruit  - 6-oz container Dannon Light n Fit vanilla yogurt, topped with 1oz unsalted nuts         - apple, celery or baby carrots spread with 2 Tbsp natural peanut butter or almond butter   - apple slices with 1 oz slice low-fat cheese  - celery, cucumber, bell pepper or baby carrots dipped in ¼ cup hummus bean spread or light spinach and artichoke dip (*recipe in lunch section)  - 100 calorie bag microwave light popcorn with 3 tbsp grated parmesan cheese  - Wu Links Beef Steak - 14g protein! (similar to beef jerky)  - 2 wedges [place] - Light Herb & Garlic Cheese with sliced cucumber or green bell pepper  - 1/2 cup low-fat cottage cheese with ¼ cup fruit or ¼ cup salsa  - RTD Protein drinks: Atkins, Low Carb Slim Fast, EAS light,  Muscle Milk Light, etc.  - Homemade Protein drinks: [facility] Soy95, Isopure, Nectar, UNJURY, Whey Gourmet, etc. Mix 1 scoop powder with 8oz skim/1% milk or light soymilk.  - Protein bars: Atkins, EAS, Pure Protein, Think Thin, Detour, etc. Must have 0-4 grams sugar - Read the label.    Takeout Options: No more than twice/week  Deli - Salads (no pasta or rice), meats, cheeses. Roasted chicken. Lox (salmon)    Mexican - Platters which don't include tortillas, chips, or rice. Go easy on the beans. Example: Fajitas without the tortillas. Ask the  not to bring chips to the table if they are too tempting.    Greek - Meat or fish and vegetable, but no bread or rice. Including hummus, baba ganoush, etc, is OK. Most sit-down Greek restaurants can provide you with cucumber slices for dipping instead of alethea bread.    Fast Food (Avoid as much as possible) - Salads (no croutons and limit salad dressing to 2 tbsp), grilled chicken sandwich without the bun and ask for no correa. Lindas low fat chili or Taco Bell pintos and cheese.    BBQ - The meats are fine if you ask for sauces on the side, but most of the traditional side dishes are loaded with carbs. Eusebio slaw, baked beans and BBQ sauce are typically made with sugar.    Chinese - Nothing deep-fried, no rice or noodles. Many Chinese sauces have starch and sugar in them, so you'll have to use your judgement. If you find that these sauces trigger cravings, or cause Dumping, you can ask for the sauce to be made without sugar or just use soy sauce.       Eating well to be healthy and lose weight does not have to be hard. It also does not have to be time consuming or expensive. There a lots of ways you can work in healthy choices into your day. Many of these are easy, quick and even family friendly!    Homemade hazelnut au lait  Brew your favorite brand of hazelnut flavored coffee (Community makes a good one). Microwave 1/2 cup of milk that fits your eating plan (whole, skim or  sugar-free almond milk can all work). Add half to 1 oz sugar free hazelnut syrup.     Quick and easy breakfast  1-2 boiled eggs or mini-frittatas with a tangerine. The boiled eggs and mini-frittatas can both be made ahead and last for up to 4 days in the refrigerator. Bonus if you portion them out in ready to go containers or zipper bags.     Breakfast Egg Muffins with Gonzalez and Spinach  Makes 12 muffins  Ingredients    6 eggs  ¼ cup milk  ¼ teaspoon salt  2 cups grated cheddar cheese  3/4 cup spinach, cooked and drained (about 8 oz fresh spinach)  6 gonzalez slices, cooked, drained of fat, and chopped  1/2 cup grated Parmesan cheese (optional)    Instructions      Preheat oven to 350 degrees. Use a regular 12-cup muffin pan. Spray the muffin pan with non-stick cooking spray.  In a large bowl, beat eggs until smooth. Add milk, salt, Cheddar cheese and mix. Stir spinach, cooked gonzalez into the egg mixture. Ladle the egg mixture into greased muffin cups ¾ full.  Top each muffin cup with grated Parmesan cheese.  Bake for 25 minutes. Remove from the oven, let the muffins cool for 30 minutes before removing them from the pan.      Be a brown bagger! When you make dinner, plan for an extra helping. When you serve your plate for dinner, serve an additional helping into a container that you can take with you the next day. If you don't have a refrigerator available during the day, an insulated lunch bag and ice packs will help you safely store you lunch.     Cold Brewed Iced tea. Fill a pitcher with 64 oz filtered water. Add either 4 regular tea bags of your choice or a large iced tea bag. Refrigerate over night then remove the tea bags. The tea will not be bitter and is super flavorful. Get creative! Try combinations like green tea and hibiscus tea or black tea with lemon zinger. Add orange or lemon slices for even more flavor.     Snack wisely. Protein filled snacks will fill you up, allowing you to get by with fewer calories.  String cheese, pork skins (chicharrones), turkey pepperoni, or celery with cream cheese will all fit the bill.       Ditch the take out. Turkey tacos (with or without a low carb tortilla), burgers (without the bun), or fun stir fries are all quick and easy. The whole family will be happy, and you can save calories and money.      Orange Chicken Stir patten with asparagus   Makes 6 servings  Ingredients:    1.5 lbs boneless skinless chicken breast/tenders, diced into 1-inch pieces  1 Tbsp extra virgin olive or avocado oil, divided  2 lb asparagus, end portions trimmed and remainder diced into 1 1/2-inch pieces  1 small yellow onion, sliced into thin strips  8 oz button mushrooms, sliced  1 Tbsp peeled and finely grated fresh adolph  4 cloves garlic, minced  1/2 cup low-sodium chicken broth  Juice of 2 fresh oranges  2 Tbsp low sodium soy sauce  2 Tbsp cornstarch  Sea salt and freshly ground black pepper    Directions:    In a 12-inch non-stick wok, heat 1/2 oil over moderately high heat. Once oil is hot, add diced chicken and season lightly with salt and pepper. Sauté until cooked through, tossing occasionally, about 5-6 minutes.  Place chicken on a large plate and set aside. Return wok, reduce to medium-high heat, add remaining oil.  Once oil is hot, add asparagus, yellow onion and mushrooms, and sauté until tender-crisp, about 4 - 5 minutes, adding in garlic and adolph during the last 1 minute of sautéing.  Meanwhile, in a mixing bowl whisk together chicken broth, orange juice, soy sauce and cornstarch until well blended.  Pour chicken broth mixture into skillet with veggies, season with salt and pepper to taste, and bring mixture to a light boil, stirring constantly. Allow mixture to gently boil, stirring constantly, until thickened, about 1 minute.  Toss chicken into mixture and serve immediately over cauliflower rice or Shirataki noodles.      Skinny Chicken Tortilla Soup  Makes 7 servings    2 teaspoons olive  oil  1 cup onion, chopped (about 1 small)  2 cups celery, sliced (about 4 medium stalks)  4 garlic cloves, minced  4 medium tomatoes, chopped  2 cups water  4 cups low-sodium organic chicken broth  3 cups chopped and/or shredded rotisserie chicken, skinless  2 cups sliced carrots (about 3 medium)  1 teaspoon dried oregano leaves  2 teaspoons chili powder  1 teaspoon garlic powder  2 teaspoons cumin  ½ teaspoon cayenne pepper (add less or omit, if you don't want a spicy soup)  ½ teaspoon sea salt + more to taste  ½ teaspoon pepper + more to taste    Directions:   Put all ingredients into a large crock pot. Cook on low for 5-6 hours.     Optional garnish with chopped avocado, chopped fresh cilantro, crumbled Cotija cheese, sour cream, Greek yogurt, your favorite hot sauce.           Vegan Avocado Banana Chocolate Pudding  Makes 4 servings  Ingredients    1 1/2 ripe avocados  2 ripe bananas  6 tbsp raw cacao powder or unsweetened cocoa powder  2-3 tbsp maple syrup (or calorie free sweetener)  1/4 cup almond milk  Instructions    Blend everything together in a  until the consistency is smooth and velvety. Taste and see if more sweetener is needed and stir to make sure everything is evenly mixed. Blend a second time if needed.  Top with banana slices, raw cacao nibs, almond butter, or any other toppings and enjoy!

## 2017-12-28 NOTE — LETTER
December 28, 2017      Sumi Corrigan MD  [address]  [facility] [address]           [facility]  [address]  [facility] [address]  Phone: [phone]  Fax: [phone]          Patient: Dai Roper   MR Number: [id number]   YOB: 1965   Date of Visit: 12/28/2017       Dear Dr. Sumi Corrigan:    Thank you for referring Dai Roper to me for evaluation. Attached you will find relevant portions of my assessment and plan of care.    If you have questions, please do not hesitate to call me. I look forward to following Dai Roper along with you.    Sincerely,    Moni Mario MD    Enclosure  CC:  No Recipients    If you would like to receive this communication electronically, please contact [email or web address] or [phone] to request more information on [employer] access.    For providers and/or their staff who would like to refer a patient to [facility], please contact us through our one-stop-shop provider referral line, [facility], at [phone].    If you feel you have received this communication in error or would no longer like to receive these types of communications, please e-mail [email or web address]

## 2017-12-29 ENCOUNTER — DOCUMENTATION ONLY (OUTPATIENT)
Dept: PHARMACY | Facility: CLINIC | Age: 52
End: 2017-12-29

## 2017-12-29 NOTE — PROGRESS NOTES
Adherence packed for 16 days on 12/29/2017.  Repackaging due on 01/18/2018. Patient scheduled with MD DUVALL that day.     Morning:  Gabapentin 300mg  Lisinopril-HCTZ 20-12.5mg  Duloxetine 30mg x2  Metformin ER 500mg  Topiramate 50mg     Lunch:  Gabapentin 300mg  *Per patient's request we did not prepare this pack today. Patient only takes this dose as needed and has plenty of doses left at home.     Evening:  Metformin ER 500mg  Gabapentin 300mg  Atorvastatin 40mg  Topiramate 50mg     Patient understands she nees to bring ASA 81mg, gabapentin, and metformin bottles to the pharmacy on 01/18/2018 to be added into her adherence packages next month.

## 2018-01-09 ENCOUNTER — OFFICE VISIT (OUTPATIENT)
Dept: OPTOMETRY | Facility: CLINIC | Age: 53
End: 2018-01-09
Payer: MEDICARE

## 2018-01-09 DIAGNOSIS — H04.123 BILATERAL DRY EYES: Primary | ICD-10-CM

## 2018-01-09 PROCEDURE — 92012 INTRM OPH EXAM EST PATIENT: CPT | Mod: S$GLB,,, | Performed by: OPTOMETRIST

## 2018-01-09 PROCEDURE — 99999 PR PBB SHADOW E&M-EST. PATIENT-LVL III: CPT | Mod: PBBFAC,,, | Performed by: OPTOMETRIST

## 2018-01-09 PROCEDURE — 99499 UNLISTED E&M SERVICE: CPT | Mod: S$GLB,,, | Performed by: OPTOMETRIST

## 2018-01-09 NOTE — PROGRESS NOTES
"HPI     Ms. Dai Roper is here for an urgent visit.    Eyes feeling scratchy, itchy, dry (she says she cannot produce any tears   even when crying), quick sharp stinging, and heaviness of superior lids OU   for 1 week. Pt complained of these symptoms at last visit 12/21/17, but   now worse. She is not using any drops.     She also complains of glasses feeling too strong, give slight headache   (she also says they "look too strong" when looking at glasses in her hand   - possibly that they are too thick?). Her last visit was 12/21/17,   received glasses on 12/27/17. She denies any eye strain, and reports clear   vision with glasses.    (-)drops  (-)flashes  (-)floaters  (-)diplopia     Diabetic yes   Hemoglobin A1C       Date                     Value               Ref Range             Status                07/11/2017               5.7 (H)             4.0 - 5.6 %           Final                 04/12/2017               5.9                 4.5 - 6.2 %           Final                 01/03/2017               6.0                 4.5 - 6.2 %           Final                OCULAR HISTORY  Last Eye Exam 12/21/17 with Dr. Martínez  Diabetic eye screening photos: 01/09/17: No BDR and mild cataract OU.  (-)eye surgery   Dry eyes   Cataracts OU    FAMILY HISTORY  (-)Glaucoma none       Last edited by Lesli Martínez, OD on 1/9/2018  3:48 PM. (History)            Assessment /Plan     For exam results, see Encounter Report.    Bilateral dry eyes   Recommended artificial tears TID-QID OU and also discussed warm compresses.   Advised pt that dry eyes can affect refractive error, so recommended she RTC 2-3 weeks for dry eye f/u (and refraction if needed). Or RTC immediately if symptoms worsen.   At f/u, if no improvement with artificial tears, then consider Restasis and/or punctal plugs.       RTC in about 2-3 weeks for dry eye f/u and refraction (scheduled for 01/23/18)                 "

## 2018-01-09 NOTE — PATIENT INSTRUCTIONS
"DRY EYES     Dry eyes is a common condition. It can be caused by an insufficient volume of tears, or by tears that do not spread evenly over the cornea. An uneven tear film can also cause vision to blur intermittently.   Dry eyes are often associated with burning, stinging, and/or red eyes.As we age, the eyes usually produce fewer tears and result in decreased normal eye lubrication. Paradoxically, dry eye can make eyes water. When they water, that watery production actually makes the dry eye situation worse because the watery tears do not lubricate the eye well at all. Watery tears really serve to flush foreign material out of the eye, not to lubricate. Unfortunately, when the eyes get really dry they become irritated and stimulate the formation of watery tears.   Lubricants, in the form of drops or ointments, are the principal treatment for dry eyes. The following are recommendations for managing your dry eye condition:    1) Use over-the-counter artificial tears or lubricants (such as Systane Balance, Soothe XP, Refresh Optive, Blink, or Genteal), 1 drop in each eye 3 to 4 times a day. Please avoid drops that advertise redness relief since these can be unhealthy for your eyes and can cause permanent redness if used long-term.     It is best to take artificial tears on a schedule, like you would for a medication. Taking them routinely at breakfast, lunch, and dinner for example will provide better relief and better use of the tear drop than taking them whenever your eyes "feel" dry.    2) Optional use of over-the-counter lubricating gels (such as Genteal Gel, Systane Gel, or Refresh PM) applied to the inside of the lower lid of both eyes at bedtime. This gel is very thick and may cause blurred vision, so we recommend you use it before bedtime. In the morning you can gently wipe your eyes with a damp washcloth to remove any residual gel.    3) Warm compresses applied to the closed eyelids twice per day, followed " with lid massage.    4) Always drink an adequate amount of water daily (4-6 cups a day).    5) 1000 mg of good quality fish oil (labeled DHA and/or EPA). Flaxseed oil can also be beneficial. Do not take fish oil if you are currently taking a medication called warfarin or coumadin.    6) Use a humidifier in your bedroom.    7) If the dryness continues there may be additional options of punctal plugs, or prescription dry eye drops such as Restasis or Xiidra. Punctal plugs are medical devices inserted into the puncta or the drain of the eye to block some of your natural tears from draining off the eye. Restasis and Xiidra are prescription eye drops that, over time, may help your body make more tears naturally.    It is important to maintain this treatment plan until your symptoms have improved. Once improved, you can reduce the frequency of lubricants and warm compresses. If the symptoms recur, then apply as needed for comfort.

## 2018-01-10 ENCOUNTER — DOCUMENTATION ONLY (OUTPATIENT)
Dept: PSYCHIATRY | Facility: CLINIC | Age: 53
End: 2018-01-10

## 2018-01-10 ENCOUNTER — OFFICE VISIT (OUTPATIENT)
Dept: PSYCHIATRY | Facility: CLINIC | Age: 53
End: 2018-01-10
Payer: MEDICARE

## 2018-01-10 DIAGNOSIS — F33.1 MDD (MAJOR DEPRESSIVE DISORDER), RECURRENT EPISODE, MODERATE: ICD-10-CM

## 2018-01-10 DIAGNOSIS — F41.1 GAD (GENERALIZED ANXIETY DISORDER): Primary | ICD-10-CM

## 2018-01-10 DIAGNOSIS — F41.0 PANIC ATTACKS: ICD-10-CM

## 2018-01-10 PROCEDURE — 90832 PSYTX W PT 30 MINUTES: CPT | Mod: S$GLB,,, | Performed by: SOCIAL WORKER

## 2018-01-10 PROCEDURE — 90832 PSYTX W PT 30 MINUTES: CPT | Mod: PBBFAC | Performed by: SOCIAL WORKER

## 2018-01-10 NOTE — PROGRESS NOTES
Individual Psychotherapy (PhD/LCSW)    1/10/2018    Site:  [facility]         Therapeutic Intervention: Met with patient.  Outpatient - Insight oriented psychotherapy 30 min - CPT code 67281    Chief complaint/reason for encounter: depression, mood swings, anxiety, sleep, appetite, behavior, cognition, somatic and interpersonal     Interval history and content of current session: a lot of stress, medical, family, hard to do things, lives with 13 year old grand daughter that is going well, likes to cook sometimes, does not want to visit other family as too much stress occurs, has cateracts, has weight issues,. Considering weight loss surgery, grief and loss of two family members passing over the holidays, recommended taking care of herself, manage stress, coping skills and not push herself right now, sleep issues also and working on getting her into an MD and or PNP soon in this department if possible.    Treatment plan:  · Target symptoms: depression, recurrent depression, anxiety , mood swings, mood disorder, adjustment, grief  · Why chosen therapy is appropriate versus another modality: relevant to diagnosis, patient responds to this modality, evidence based practice  · Outcome monitoring methods: self-report, observation  · Therapeutic intervention type: insight oriented psychotherapy, behavior modifying psychotherapy, supportive psychotherapy    Risk parameters:  Patient reports no suicidal ideation  Patient reports no homicidal ideation  Patient reports no self-injurious behavior  Patient reports no violent behavior    Verbal deficits: None    Patient's response to intervention:  The patient's response to intervention is accepting.    Progress toward goals and other mental status changes:  The patient's progress toward goals is limited.    Diagnosis:     ICD-10-CM ICD-9-CM   1. LANIE (generalized anxiety disorder) F41.1 300.02   2. MDD (major depressive disorder), recurrent episode, moderate F33.1 296.32    3. Panic attacks F41.0 300.01       Plan:  individual psychotherapy, consult psychiatrist for medication evaluation and medication management by physician    Return to clinic: 3 weeks    Length of Service (minutes): 30     no difficulty walking/imbalance/no seizure

## 2018-01-19 ENCOUNTER — LAB VISIT (OUTPATIENT)
Dept: LAB | Facility: HOSPITAL | Age: 53
End: 2018-01-19
Attending: INTERNAL MEDICINE
Payer: MEDICARE

## 2018-01-19 ENCOUNTER — TELEPHONE (OUTPATIENT)
Dept: INTERNAL MEDICINE | Facility: CLINIC | Age: 53
End: 2018-01-19

## 2018-01-19 ENCOUNTER — OFFICE VISIT (OUTPATIENT)
Dept: INTERNAL MEDICINE | Facility: CLINIC | Age: 53
End: 2018-01-19
Payer: MEDICARE

## 2018-01-19 VITALS
DIASTOLIC BLOOD PRESSURE: 58 MMHG | BODY MASS INDEX: 44.83 KG/M2 | SYSTOLIC BLOOD PRESSURE: 79 MMHG | HEIGHT: 62 IN | HEART RATE: 78 BPM | WEIGHT: 243.63 LBS

## 2018-01-19 DIAGNOSIS — E78.5 HYPERLIPIDEMIA ASSOCIATED WITH TYPE 2 DIABETES MELLITUS: ICD-10-CM

## 2018-01-19 DIAGNOSIS — E11.59 HYPERTENSION ASSOCIATED WITH DIABETES: ICD-10-CM

## 2018-01-19 DIAGNOSIS — E55.9 VITAMIN D DEFICIENCY: ICD-10-CM

## 2018-01-19 DIAGNOSIS — J44.89 CHRONIC OBSTRUCTIVE ASTHMA: ICD-10-CM

## 2018-01-19 DIAGNOSIS — E11.69 HYPERLIPIDEMIA ASSOCIATED WITH TYPE 2 DIABETES MELLITUS: ICD-10-CM

## 2018-01-19 DIAGNOSIS — E66.01 MORBID OBESITY DUE TO EXCESS CALORIES: ICD-10-CM

## 2018-01-19 DIAGNOSIS — Z12.11 COLON CANCER SCREENING: ICD-10-CM

## 2018-01-19 DIAGNOSIS — F33.1 MDD (MAJOR DEPRESSIVE DISORDER), RECURRENT EPISODE, MODERATE: ICD-10-CM

## 2018-01-19 DIAGNOSIS — E11.8 TYPE 2 DIABETES MELLITUS WITH COMPLICATION, WITHOUT LONG-TERM CURRENT USE OF INSULIN: ICD-10-CM

## 2018-01-19 DIAGNOSIS — I15.2 HYPERTENSION ASSOCIATED WITH DIABETES: ICD-10-CM

## 2018-01-19 DIAGNOSIS — E11.42 DIABETIC POLYNEUROPATHY ASSOCIATED WITH TYPE 2 DIABETES MELLITUS: ICD-10-CM

## 2018-01-19 DIAGNOSIS — F41.0 PANIC ATTACKS: ICD-10-CM

## 2018-01-19 LAB
25(OH)D3+25(OH)D2 SERPL-MCNC: 9 NG/ML
CHOLEST SERPL-MCNC: 182 MG/DL
CHOLEST/HDLC SERPL: 2.6 {RATIO}
ESTIMATED AVG GLUCOSE: 117 MG/DL
HBA1C MFR BLD HPLC: 5.7 %
HDLC SERPL-MCNC: 71 MG/DL
HDLC SERPL: 39 %
LDLC SERPL CALC-MCNC: 91 MG/DL
NONHDLC SERPL-MCNC: 111 MG/DL
TRIGL SERPL-MCNC: 100 MG/DL

## 2018-01-19 PROCEDURE — 82306 VITAMIN D 25 HYDROXY: CPT

## 2018-01-19 PROCEDURE — 36415 COLL VENOUS BLD VENIPUNCTURE: CPT

## 2018-01-19 PROCEDURE — 83036 HEMOGLOBIN GLYCOSYLATED A1C: CPT

## 2018-01-19 PROCEDURE — 99215 OFFICE O/P EST HI 40 MIN: CPT | Mod: S$GLB,,, | Performed by: INTERNAL MEDICINE

## 2018-01-19 PROCEDURE — 99999 PR PBB SHADOW E&M-EST. PATIENT-LVL III: CPT | Mod: PBBFAC,,, | Performed by: INTERNAL MEDICINE

## 2018-01-19 PROCEDURE — 80061 LIPID PANEL: CPT

## 2018-01-19 PROCEDURE — 99499 UNLISTED E&M SERVICE: CPT | Mod: S$GLB,,, | Performed by: INTERNAL MEDICINE

## 2018-01-19 RX ORDER — DEXTROSE 4 G
1 TABLET,CHEWABLE ORAL DAILY
Qty: 1 EACH | Refills: 0 | Status: SHIPPED | OUTPATIENT
Start: 2018-01-19 | End: 2020-06-24 | Stop reason: SDUPTHER

## 2018-01-19 NOTE — ASSESSMENT & PLAN NOTE
BMI 44 in 4/2017 (was 49 in 1/2017), patient with DM, HTN. Losing weight through exercise and diet changes through health   · Decrease trans fats, decrease caloric intake  · No-show to health  referral  · Did not attend medical fitness program   · Not using Silver sneakers membership at [employer]  · Given 5:2 IER diet info  · Referral to Dr Mario for weight loss suggestions  · Started on topiramate  · Diabetic ed class completed  · Restarted on metformin

## 2018-01-19 NOTE — PROGRESS NOTES
Primary Care Provider Appointment    Subjective:      Patient ID: Dai Roper is a 52 y.o. female DM, LANIE, MDD, HTN, obesity    Chief Complaint: Follow-up    Patient with recent weight loss clinic appt with Dr Wright. She was started on topiramate BID on 12/28/17, but has gained weight since its initiation.    Patient is due for colon CA screening, but did not turn in fitkit. She still has the kit.    Her BP is low today since starting topiramate and increased duloxetine. He is also taking ACE-HCTZ BP med.    She has depression, is compliant with duloxetine BID with improvement in spite of recent family trauma. She is requesting anxiety. Her son was a murder-attempt victim 2 weeks ago, [place] He continues to be admitted to a hospital.    She is coming back on Monday for pill packing at pharmacy. Last meds packed as follows:  Morning:  Gabapentin 300mg  Lisinopril-HCTZ 20-12.5mg  Duloxetine 30mg x2  Metformin ER 500mg  Topiramate 50mg     Lunch:  Gabapentin 300mg  *Per patient's request we did not prepare this pack today. Patient only takes this dose as needed and has plenty of doses left at home.     Evening:  Metformin ER 500mg  Gabapentin 300mg  Atorvastatin 40mg  Topiramate 50mg    Past Surgical History:   Procedure Laterality Date    CARPAL TUNNEL RELEASE      HERNIA REPAIR      TUBAL LIGATION         Past Medical History:   Diagnosis Date    Anxiety     Asthma     Depression     Diabetes mellitus     DVT (deep venous thrombosis)     Hx of psychiatric care     Hypertension     Psychiatric problem     Sleep difficulties        Review of Systems   Constitutional: Positive for activity change and appetite change. Negative for fever.   Cardiovascular: Positive for leg swelling.   Gastrointestinal: Negative for constipation and diarrhea.   Musculoskeletal: Positive for arthralgias, joint swelling and myalgias.   Hematological: Does not bruise/bleed easily.  "  Psychiatric/Behavioral: Positive for decreased concentration, dysphoric mood and sleep disturbance. Negative for behavioral problems and confusion. The patient is nervous/anxious.        Objective:   BP (!) 79/58 (BP Location: Right arm, Patient Position: Sitting, BP Method: Large (Manual))   Pulse 78   Ht 5' 2" (1.575 m)   Wt 110.5 kg (243 lb 9.7 oz)   LMP 02/05/2013 (LMP Unknown)   BMI 44.56 kg/m²     Physical Exam   Constitutional: She is oriented to person, place, and time. She appears well-developed and well-nourished.   obese   HENT:   Head: Normocephalic and atraumatic.   Dental caries   Neck: Normal range of motion.   Cardiovascular: Normal rate.    Pulmonary/Chest: Effort normal.   Abdominal:   Obese abdomen   Musculoskeletal: She exhibits no edema.   Neurological: She is alert and oriented to person, place, and time.   Psychiatric: She has a normal mood and affect. Her behavior is normal. Thought content normal.   Vitals reviewed.      Lab Results   Component Value Date    WBC 7.70 08/27/2017    HGB 11.9 (L) 08/27/2017    HCT 36.9 (L) 08/27/2017     08/27/2017    CHOL 254 (H) 04/12/2017    TRIG 84 04/12/2017    HDL 80 (H) 04/12/2017    ALT 13 08/27/2017    AST 16 08/27/2017     08/27/2017    K 3.9 08/27/2017     08/27/2017    CREATININE 0.8 08/27/2017    BUN 18 08/27/2017    CO2 25 08/27/2017    TSH 0.687 06/02/2017    INR 0.9 08/28/2017    HGBA1C 5.7 (H) 07/11/2017         Assessment:   52 y.o. female with multiple co-morbid illnesses here to continue work-up of chronic issues notably DM, LANIE, MDD, HTN, obesity    Plan:     Problem List Items Addressed This Visit        Neuro    Diabetic polyneuropathy associated with type 2 diabetes mellitus     A1c 5.7, compliant with metformin 500mg daily, taking gabapentin 300mg TID PRN  · F/U podiatry  · Continue gabapentin 300mg TID  · Continue metformin  · Continue duloxetine            Psychiatric    MDD (major depressive disorder), " recurrent episode, moderate     PHQ-9 score of 15 with previous history of depression requiring medical treatment. No SI/HI, endorses caretaker burnout  · Increase duloxetine to 60mg  · Take in morning due to insomnia complaints  · [facility] at [facility]  · Continue psychology treatment in [facility]         Panic attacks     Recent trauma of son, followed by psychology  · Continue psychology follow-up  · Continue duloxetine BID            Pulmonary    Chronic obstructive asthma     Patient with no smoking history, unaware of pulm work-up. Questionable compliance with PETERSON, inhaled steroids. Differential includes asthma vs [facility]. Symptoms improved with weight loss  · No-show to previous PFT  · Continue PETERSON, inhaled corticosteroids  · Taking as needed  · Advised to continue losing weight            Cardiac/Vascular    Hyperlipidemia associated with type 2 diabetes mellitus     A1c 5.7 in 7/2017, compliant metformin 500mg daily, LDL>70  · Advised to decrease trans fats  · Continue atorvastatin 40mg  · Continue ASA 81mg  · Continue exercise and lifestyle changes  · Restart metformin  · Referral to Dr Mario for weight loss recommendations  · Started on topiramate         Relevant Medications    blood-glucose meter (ACCU-CHEK BAKARI CONNECT METER) Misc    blood sugar diagnostic (ACCU-CHEK BAKARI) Strp    Other Relevant Orders    Hemoglobin A1c    Lipid panel    Hypertension associated with diabetes     A1c 5.7 in 7/2017,variable BP, but elevated previously  · HOLD HCTZ-lisinopril due to hypotension in clinic today  · Electrolytes normal  · Start digital HTN program         Relevant Medications    blood-glucose meter (ACCU-CHEK BAKARI CONNECT METER) Misc    blood sugar diagnostic (ACCU-CHEK BAKARI) Strp    Other Relevant Orders    Hypertension Digital Medicine (HDMP) Enrollment Order (Completed)    Hypertension Digital Medicine (HDMP): Assign Onboarding Questionnaires (Completed)       Endocrine    Morbid obesity due to  excess calories     BMI 44 in 4/2017 (was 49 in 1/2017), patient with DM, HTN. Losing weight through exercise and diet changes through health   · Decrease trans fats, decrease caloric intake  · No-show to health  referral  · Did not attend medical fitness program   · Not using Silver sneakers membership at [place]  · Given 5:2 IER diet info  · Referral to Dr Mario for weight loss suggestions  · Started on topiramate  · Diabetic ed class completed  · Restarted on metformin         Type 2 diabetes mellitus with complication, without long-term current use of insulin     A1c 5.9 in 4/2017, compliant metformin 500mg daily, diarrhea resolved.  · Advised to cut back on high-glycemic foods  · Medical fitness consult, silver sneakers membership  · Patient restarted metformin 500 BID   · STRONGLY encouraged to exercise  · Diabetic eye screening photo 1/2018  · No DR         Relevant Medications    blood-glucose meter (ACCU-CHEK BAKARI CONNECT METER) Misc    blood sugar diagnostic (ACCU-CHEK BAKARI) Strp    Other Relevant Orders    Hemoglobin A1c    Lipid panel    Uncontrolled secondary diabetes mellitus with stage 2 CKD (GFR 60-89)     GFR >60, A1c 6 in 1/2017  · Avoid nephrotoxic meds  · Advised to switch from ibuprofen to tylenol  · Continue DM and HTN control         Vitamin D deficiency     Vit D level of 10 in 1/2017  · Did not tolerate high-dose vit D 50,000U due to swelling  · Intolerant of 5000U daily due to swelling  · Advised to eat more tuna, salmon, eggs, cheese, vit D- fortified foods         Relevant Orders    Vitamin D       GI    Colon cancer screening     No history of colonoscopy  FitKit was given to patient in 7/2017   Never turned in  FitKit was given to patient again in 12/2017   Advised to turn in               Health Maintenance       Date Due Completion Date    Colonoscopy 06/30/2013 ---iFOBT needs to be mailed    TETANUS VACCINE 01/06/2016 1/6/2006    Hemoglobin A1c 01/11/2018 7/11/2017-  TODAY    Lipid Panel 04/12/2018 4/12/2017- TODAY    Foot Exam 06/22/2018 6/22/2017 (Done)    Override on 6/22/2017: Done    Eye Exam 01/09/2019 1/9/2018    Pap Smear with HPV Cotest 05/11/2019 5/11/2016 (Done)    Override on 5/11/2016: Done (normal PAP at [facility])    Mammogram 12/15/2019 12/15/2017    Pneumococcal PPSV23 (Medium Risk) (2) 06/30/2028 4/3/2017          Follow-up in about 6 weeks (around 3/2/2018). . One hour spent with this patient today, half of that in counseling.    Sumi Corrigan MD/MPH  Internal Medicine  [facility]  [phone]

## 2018-01-19 NOTE — ASSESSMENT & PLAN NOTE
A1c 5.7 in 7/2017, compliant metformin 500mg daily, LDL>70  · Advised to decrease trans fats  · Continue atorvastatin 40mg  · Continue ASA 81mg  · Continue exercise and lifestyle changes  · Restart metformin  · Referral to Dr Mario for weight loss recommendations  · Started on topiramate

## 2018-01-19 NOTE — ASSESSMENT & PLAN NOTE
No history of colonoscopy  FitKit was given to patient in 7/2017   Never turned in  FitKit was given to patient again in 12/2017   Advised to turn in

## 2018-01-19 NOTE — ASSESSMENT & PLAN NOTE
Recent trauma of son, followed by psychology  · Continue psychology follow-up  · Continue duloxetine BID

## 2018-01-19 NOTE — Clinical Note
Bean will bring meds on Monday for pill packing. Her BP was extremely low today, I am stopping her HCTZ-lisinopril because of that. Please do not place that med in her pill pack. Could we only pack for 2 weeks, so that she can particiapte in the digitial HTN program and possibly have meds changed if BP increases again.  Thanks, ELOINA

## 2018-01-19 NOTE — ASSESSMENT & PLAN NOTE
A1c 5.9 in 4/2017, compliant metformin 500mg daily, diarrhea resolved.  · Advised to cut back on high-glycemic foods  · Medical fitness consult, silver sneakers membership  · Patient restarted metformin 500 BID   · STRONGLY encouraged to exercise  · Diabetic eye screening photo 1/2018  · No DR

## 2018-01-19 NOTE — ASSESSMENT & PLAN NOTE
PHQ-9 score of 15 with previous history of depression requiring medical treatment. No SI/HI, endorses caretaker burnout  · Increase duloxetine to 60mg  · Take in morning due to insomnia complaints  · Continue psychiatric care at [facility]  · Continue psychology treatment in [facility]

## 2018-01-19 NOTE — PATIENT INSTRUCTIONS
TODAY:  - stop HCTZ-lisinopril  - digital HTN program   + must go to [facility] and buy BP cuff for phone today  - mail in stool kit (iFOBT) for colon cancer screening  - labs today  -  pill pack on Monday   + bring your home meds (metformin, ASA, gabapentin) to pharmacy   + pills will be packed for 2 weeks    + blood pressure meds to be changed  - accuchek meter ordered from [employer]

## 2018-01-19 NOTE — ASSESSMENT & PLAN NOTE
A1c 5.7 in 7/2017,variable BP, but elevated previously  · HOLD HCTZ-lisinopril due to hypotension in clinic today  · Electrolytes normal  · Start digital HTN program

## 2018-01-23 ENCOUNTER — DOCUMENTATION ONLY (OUTPATIENT)
Dept: PHARMACY | Facility: CLINIC | Age: 53
End: 2018-01-23

## 2018-01-24 ENCOUNTER — TELEPHONE (OUTPATIENT)
Dept: INTERNAL MEDICINE | Facility: CLINIC | Age: 53
End: 2018-01-24

## 2018-01-24 NOTE — TELEPHONE ENCOUNTER
Please let patient know that her cholesterol is significantly improved with the statin. Her vitamin D is extremely low and she would benefit from supplementation. Is she comfortable with starting a low dose supplement daily?    Has she been checking her BP? Could you get some recent readings from her?    Thanks,  ELOINA

## 2018-01-25 ENCOUNTER — PATIENT MESSAGE (OUTPATIENT)
Dept: INTERNAL MEDICINE | Facility: CLINIC | Age: 53
End: 2018-01-25

## 2018-01-25 NOTE — TELEPHONE ENCOUNTER
Dr Corrigan , patient called me back. I made her aware of her cholesterol level has improved with the statin and that her Vitamin D level was very low, she has agreed to start a supplement. She wants you to send a script to [facility]. She has been taking her blood pressure. It has been running 90/70's to 100/60's , nothing higher than that.

## 2018-01-25 NOTE — TELEPHONE ENCOUNTER
Dr Corrigan we have been unable to connect with the patient. Her VM is not set up. I left her a [facility] message in the hopes that she will see it and contact us.

## 2018-01-26 ENCOUNTER — TELEPHONE (OUTPATIENT)
Dept: INTERNAL MEDICINE | Facility: CLINIC | Age: 53
End: 2018-01-26

## 2018-01-26 DIAGNOSIS — E55.9 VITAMIN D DEFICIENCY: Primary | ICD-10-CM

## 2018-01-26 RX ORDER — ACETAMINOPHEN 500 MG
5000 TABLET ORAL DAILY
Qty: 90 TABLET | Refills: 3 | Status: SHIPPED | OUTPATIENT
Start: 2018-01-26 | End: 2023-06-27

## 2018-01-26 NOTE — TELEPHONE ENCOUNTER
Script for vitamin D3 5000U sent to [facility]. Please advise patient to start taking it immediately.     RAY- Could this be added to her next bubble pack from pharmacy? She is due for next filling on 2/6/18.    Addendum: Allergies reviewed, and patient's previous reaction of leg swelling while taking weekly high-dose Vit D2 is not an allergy and reaction likely was been caused by other lifestyle reasons. Given her risk of bone mass loss, and other complications of vitamin D deficiency, the benefit of attempting D3 supplementation outweighs the risks of leg swelling.    Thanks,  KJ

## 2018-01-29 NOTE — TELEPHONE ENCOUNTER
Spoke with pt and is informed that the vit D 5000U was sent over to our pharmacy here [facility] & advised to start medication STAT.

## 2018-01-31 ENCOUNTER — OFFICE VISIT (OUTPATIENT)
Dept: PSYCHIATRY | Facility: CLINIC | Age: 53
End: 2018-01-31
Payer: MEDICARE

## 2018-01-31 DIAGNOSIS — F33.1 MDD (MAJOR DEPRESSIVE DISORDER), RECURRENT EPISODE, MODERATE: Primary | ICD-10-CM

## 2018-01-31 PROCEDURE — 90832 PSYTX W PT 30 MINUTES: CPT | Mod: S$GLB,,, | Performed by: SOCIAL WORKER

## 2018-01-31 NOTE — PROGRESS NOTES
Individual Psychotherapy (PhD/LCSW)    1/31/2018    Site:  [facility]         Therapeutic Intervention: Met with patient.  Outpatient - Insight oriented psychotherapy 30 min - CPT code 26370    Chief complaint/reason for encounter: depression, mood swings, anxiety, sleep and behavior     Interval history and content of current session: having sleep issues, coping skills, health issues, and a lot of grief over her son having a major accident and not doing well, medically, he was in the hospital, several injuries, the long term nature of his recovery is un certain at this time, taking care of herself, feelings, family and mood all focused on and how to get thru the issues about her son focused on also.    Treatment plan:  · Target symptoms: depression, recurrent depression, anxiety , mood swings, mood disorder, adjustment, grief  · Why chosen therapy is appropriate versus another modality: relevant to diagnosis, patient responds to this modality, evidence based practice  · Outcome monitoring methods: self-report, observation  · Therapeutic intervention type: insight oriented psychotherapy, behavior modifying psychotherapy, supportive psychotherapy    Risk parameters:  Patient reports no suicidal ideation  Patient reports no homicidal ideation  Patient reports no self-injurious behavior  Patient reports no violent behavior    Verbal deficits: None    Patient's response to intervention:  The patient's response to intervention is accepting.    Progress toward goals and other mental status changes:  The patient's progress toward goals is limited.    Diagnosis:     ICD-10-CM ICD-9-CM   1. MDD (major depressive disorder), recurrent episode, moderate F33.1 296.32       Plan:  individual psychotherapy, consult psychiatrist for medication evaluation and medication management by physician    Return to clinic: 3 weeks    Length of Service (minutes): 30

## 2018-03-13 ENCOUNTER — TELEPHONE (OUTPATIENT)
Dept: OPHTHALMOLOGY | Facility: CLINIC | Age: 53
End: 2018-03-13

## 2018-03-14 ENCOUNTER — OFFICE VISIT (OUTPATIENT)
Dept: OPTOMETRY | Facility: CLINIC | Age: 53
End: 2018-03-14
Payer: MEDICARE

## 2018-03-14 ENCOUNTER — TELEPHONE (OUTPATIENT)
Dept: OPHTHALMOLOGY | Facility: CLINIC | Age: 53
End: 2018-03-14

## 2018-03-14 DIAGNOSIS — H00.024 HORDEOLUM INTERNUM OF LEFT UPPER EYELID: Primary | ICD-10-CM

## 2018-03-14 PROCEDURE — 99999 PR PBB SHADOW E&M-EST. PATIENT-LVL II: CPT | Mod: PBBFAC,,, | Performed by: OPTOMETRIST

## 2018-03-14 PROCEDURE — 92012 INTRM OPH EXAM EST PATIENT: CPT | Mod: S$GLB,,, | Performed by: OPTOMETRIST

## 2018-03-14 RX ORDER — DOXYCYCLINE 100 MG/1
100 CAPSULE ORAL 2 TIMES DAILY
Qty: 28 CAPSULE | Refills: 0 | Status: SHIPPED | OUTPATIENT
Start: 2018-03-14 | End: 2018-05-03

## 2018-03-14 NOTE — PROGRESS NOTES
HPI     Patient's age: 52 y.o.    Approximate date of last eye examination:  1/9/18  Name of last eye doctor seen: Dr. Sanches    Pt states that yesterday little knot on eye was small, by night time got   worse swollen bad and hurting.  Having drainage of eye. 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    Wears glasses? yes       ! OTC eyedrops currently using:  none   ! Prescription eye meds currently using:  None          Last edited by Elzbieta Trinidad MA on 3/14/2018  2:06 PM. (History)            Assessment /Plan     For exam results, see Encounter Report.    Hordeolum internum of left upper eyelid   Warm compresses QID OS    -     doxycycline (VIBRAMYCIN) 100 MG Cap; Take 1 capsule (100 mg total) by mouth 2 (two) times daily.  Dispense: 28 capsule; Refill: 0      RTC PRN

## 2018-04-03 ENCOUNTER — DOCUMENTATION ONLY (OUTPATIENT)
Dept: PHARMACY | Facility: CLINIC | Age: 53
End: 2018-04-03

## 2018-04-03 ENCOUNTER — OFFICE VISIT (OUTPATIENT)
Dept: PSYCHIATRY | Facility: CLINIC | Age: 53
End: 2018-04-03
Payer: MEDICARE

## 2018-04-03 VITALS
DIASTOLIC BLOOD PRESSURE: 57 MMHG | HEART RATE: 82 BPM | WEIGHT: 245.81 LBS | HEIGHT: 62 IN | SYSTOLIC BLOOD PRESSURE: 125 MMHG | BODY MASS INDEX: 45.24 KG/M2

## 2018-04-03 DIAGNOSIS — Z98.890 HISTORY OF BRAIN SURGERY: ICD-10-CM

## 2018-04-03 DIAGNOSIS — F41.1 GAD (GENERALIZED ANXIETY DISORDER): ICD-10-CM

## 2018-04-03 DIAGNOSIS — G47.00 INSOMNIA, UNSPECIFIED TYPE: ICD-10-CM

## 2018-04-03 DIAGNOSIS — F33.1 MODERATE EPISODE OF RECURRENT MAJOR DEPRESSIVE DISORDER: Primary | ICD-10-CM

## 2018-04-03 DIAGNOSIS — Z87.898 HISTORY OF SEIZURES: ICD-10-CM

## 2018-04-03 PROCEDURE — 99499 UNLISTED E&M SERVICE: CPT | Mod: S$GLB,,, | Performed by: PSYCHIATRY & NEUROLOGY

## 2018-04-03 PROCEDURE — 99999 PR PBB SHADOW E&M-EST. PATIENT-LVL III: CPT | Mod: PBBFAC,,, | Performed by: PSYCHIATRY & NEUROLOGY

## 2018-04-03 PROCEDURE — 99204 OFFICE O/P NEW MOD 45 MIN: CPT | Mod: S$GLB,,, | Performed by: PSYCHIATRY & NEUROLOGY

## 2018-04-03 RX ORDER — DULOXETIN HYDROCHLORIDE 30 MG/1
90 CAPSULE, DELAYED RELEASE ORAL DAILY
Qty: 90 CAPSULE | Refills: 2 | Status: SHIPPED | OUTPATIENT
Start: 2018-04-03 | End: 2018-06-22 | Stop reason: DRUGHIGH

## 2018-04-03 RX ORDER — HYDROXYZINE HYDROCHLORIDE 25 MG/1
TABLET, FILM COATED ORAL
Qty: 90 TABLET | Refills: 2 | Status: SHIPPED | OUTPATIENT
Start: 2018-04-03 | End: 2019-01-05 | Stop reason: HOSPADM
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
## 2018-04-03 NOTE — PATIENT INSTRUCTIONS
[facility]  Mental Health Medications: Antidepressants    What are the side effects?    Antidepressants may cause mild side effects that usually do not last long. Any unusual reactions or side effects should be reported to a doctor immediately.  The most common side effects associated with SSRIs and SNRIs include:   Headache, which usually goes away within a few days.   Nausea (feeling sick to your stomach), which usually goes away within a few days.   Sleeplessness or drowsiness, which may happen during the first few weeks but then goes away. Sometimes the medication dose needs to be reduced or the time of day it is taken needs to be adjusted to help lessen these side effects.   Agitation (feeling jittery).   Sexual problems, which can affect both men and women and may include reduced sex drive, and problems having and enjoying sex.    Tricyclic antidepressants can cause side effects, including:   Dry mouth    Constipation    Bladder problems. It may be hard to empty the bladder, or the urine stream may not be as strong as usual. Older men with enlarged prostate conditions may be more affected.  Sexual problems, which can affect both men and women and may include reduced sex drive, and problems having and enjoying sex.   Blurred vision, which usually goes away quickly.   Drowsiness. Usually, antidepressants that make you drowsy are taken at bedtime.    People taking MAOIs need to be careful about the foods they eat and the medicines they take. Foods and medicines that contain high levels of a chemical called tyramine are dangerous for people taking MAOIs. Tyramine is found in some cheeses, nikky, and pickles. The chemical is also in some medications, including decongestants and over-the-counter cold medicine.    Mixing MAOIs and tyramine can cause a sharp increase in blood pressure, which can lead to stroke. People taking MAOIs should ask their doctors for a complete list of  foods, medicines, and other substances to avoid. An MAOI skin patch has recently been developed and may help reduce some of these risks. A doctor can help a person figure out if a patch or a pill will work for him or her.    How should antidepressants be taken?    People taking antidepressants need to follow their doctors directions. The medication should be taken in the right dose for the right amount of time. It can take three or four weeks until the medicine takes effect. Some people take the medications for a short time, and some people take them for much longer periods. People with long-term or severe depression may need to take medication for a long time.    Once a person is taking antidepressants, it is important not to stop taking them without the help of a doctor. Sometimes people taking antidepressants feel better and stop taking the medication too soon, and the depression may return. When it is time to stop the medication, the doctor will help the person slowly and safely decrease the dose. Its important to give the body time to adjust to the change. People dont get addicted, or hooked, on the medications, but stopping them abruptly can cause withdrawal symptoms.    FDA warning on antidepressants    Antidepressants are safe and popular, but some studies have suggested that they may have unintentional effects, especially in young people. In 2004, the FDA looked at published and unpublished data on trials of antidepressants that involved nearly 4,400 children and adolescents. They found that 4 percent of those taking antidepressants thought about or tried suicide (although no suicides occurred), compared to  2 percent of those receiving placebos (sugar pill).    In 2005, the FDA decided to adopt a black box warning label--the most serious type of warning-- on all antidepressant medications. The warning says there is an increased risk of suicidal thinking or attempts in children and adolescents taking  "antidepressants. In 2007, the FDA proposed that makers of all antidepressant medications extend the warning to include young adults up through age 24.    The warning also says that patients of all ages taking antidepressants should be watched closely, especially during the first few weeks of treatment. Possible side effects to look for are depression that gets worse, suicidal thinking or behavior,  or any unusual changes in behavior such as trouble sleeping, agitation, or withdrawal from normal social situations. Families and caregivers should report any changes to the doctor. The latest information from the FDA can be found at http://www.fda.gov.    Results of a comprehensive review of pediatric trials conducted between 1988 and 2006 suggested that the benefits of antidepressant medications likely outweigh their risks to children and adolescents with major depression and anxiety disorders. The study was funded in part by [facility].    Finally, the FDA has warned that combining the newer SSRI or SNRI antidepressants with one or more serotonin based medication - such as the commonly-used triptan medications used to treat migraine headaches - could cause a life- threatening illness called serotonin syndrome.    What is serotonin syndrome? -- Serotonin syndrome is a serious problem that can cause a number of symptoms, including:  ?Feeling anxious, restless, or confused  ?Sweating  ?Muscle spasms or muscles that cannot relax normally  ?Very fast back-and-forth eye movements  ?Shaking or trembling  ?Fever  ?A fast heartbeat  ?Vomiting  ?Diarrhea  What causes serotonin syndrome? -- Serotonin syndrome can happen to people after they take certain medicines or combinations of medicines. It can also happen with certain herbal products and street drugs. There are many medicines and drugs that can lead to serotonin syndrome. In general, they all affect a chemical in the brain and body called "serotonin."    Examples of the " "medicines and drugs that can cause serotonin syndrome include:  ?Some medicines used to treat depression  ?Some medicines used to treat Parkinson disease, such as selegiline (sample brand name: Eldepryl) and rasagiline (brand name: Azilect)  ?Some pain relievers, such as meperidine (sample brand name: Demerol), tramadol (sample brand name: Ultram), and cyclobenzaprine (sample brand name: Flexeril)  ?Saint Ron's wort (an herbal medicine sometimes used for depression)  ?Medicines used to treat migraine headaches, called "triptans"  ?Some antibiotics, such as linezolid (brand name: Zyvox)  ?Street drugs, such as ecstasy, cocaine, and amphetamines    Doctors do not know why some people get serotonin syndrome and others do not. But they do know that people usually get it within hours of taking a new medicine or drug, a new dose, or a new combination of medicines or drugs.  Should I see a doctor or nurse? -- Yes. If you have symptoms of serotonin syndrome, and you recently took a new drug or medicine or a new dose, call your doctor right away. If your symptoms are severe, go to the emergency room or call for an ambulance (in the [place] and [place], dial [phone]).  Will I need tests? -- Maybe. There is no one test that can show whether or not you have serotonin syndrome. Still, some of the things the doctor will do during the exam can help him or her figure out if you have it. For instance, the doctor might test your leg muscles to see if they spasm. The doctor will also ask a lot of questions about any medicines, herbal products, or street drugs you might have taken.  If you have serotonin syndrome, it's very important that you be honest with your doctor about what you took, how much, and when.  How is serotonin syndrome treated? -- As part of treatment, the doctor will stop the medicines or drugs that caused the serotonin syndrome in the first place. He or she will also monitor your breathing, heart rate, blood pressure, and " body temperature, and try to keep these as close to normal as possible.   Depending on what you need, he or she might also:  ?Give you medicines to calm you  ?Give you medicines to block the effects of serotonin  ?Work with you to decide whether you should keep taking the medicines that caused your serotonin syndrome  Can serotonin syndrome be prevented? -- No, but there are things you can do to protect yourself.  Doctors have no way to predict who will get serotonin syndrome, so it's not possible to prevent cases caused by properly prescribed medicines. Even so, there are things you can do to protect yourself from dangerous reactions to medicines:  ?Always tell any doctor who prescribes medicines for you about all the medicines, herbal products, and street drugs you take. That way, the doctor can be careful not to give you medicines that could cause problems when combined.  ?When starting a new medicine, have the pharmacist check for drug interactions and double-check that you are taking the right amount.  ?If you are already on medicine, do not take a new herbal or over-the-counter medicine without first checking with your doctor, nurse, or pharmacist

## 2018-04-04 PROBLEM — Z98.890 HISTORY OF BRAIN SURGERY: Status: ACTIVE | Noted: 2018-04-04

## 2018-04-04 PROBLEM — Z87.898 HISTORY OF SEIZURES: Status: ACTIVE | Noted: 2018-04-04

## 2018-04-04 NOTE — PROGRESS NOTES
Adherence packed for 45 days on 03/06/2018  Repackaging due on 04/20/2018.      Morning:  ASA 81mg  Gabapentin 300mg  Duloxetine 30mg  Duloxetine 60mg to equal 90mg PO once daily  Metformin ER 500mg  Topiramate 50mg     Patient has a bottle of gabapentin 300mg at home, which she takes prn around lunchtime.     Evening:  Metformin ER 500mg  Gabapentin 300mg  Atorvastatin 40mg  Topiramate 50mg  Vitamin D3 5,000IU    Patient requested to leave hydroxyzine out of pill packs.

## 2018-04-12 ENCOUNTER — OFFICE VISIT (OUTPATIENT)
Dept: OPTOMETRY | Facility: CLINIC | Age: 53
End: 2018-04-12
Payer: MEDICARE

## 2018-04-12 DIAGNOSIS — H52.4 HYPEROPIA WITH ASTIGMATISM AND PRESBYOPIA, BILATERAL: ICD-10-CM

## 2018-04-12 DIAGNOSIS — Z46.0 ENCOUNTER FOR FITTING OR ADJUSTMENT OF SPECTACLES OR CONTACT LENSES: Primary | ICD-10-CM

## 2018-04-12 DIAGNOSIS — H52.203 HYPEROPIA WITH ASTIGMATISM AND PRESBYOPIA, BILATERAL: ICD-10-CM

## 2018-04-12 DIAGNOSIS — H52.03 HYPEROPIA WITH ASTIGMATISM AND PRESBYOPIA, BILATERAL: ICD-10-CM

## 2018-04-12 PROCEDURE — 99499 UNLISTED E&M SERVICE: CPT | Mod: S$GLB,,, | Performed by: OPTOMETRIST

## 2018-04-12 NOTE — PROGRESS NOTES
HPI     Ms. Dai Roper is here today for a glasses prescription   recheck.    She complaints that the glasses feel too strong. She received the glasses   in December 2017 from [facility]. She states that she only wore the   glasses for a few days, and only for about 30 minutes a day, she states   that her eyes began to hurt, felt a sense of pressure so she discontinued   wear of the glasses.   This is her first pair of PAL glasses, previously wore bifocal.     Diabetic: yes. Pt says blood glucose has been well controlled and very   stable.   Hemoglobin A1C       Date                     Value               Ref Range             Status                01/19/2018               5.7 (H)             4.0 - 5.6 %           Final                  07/11/2017               5.7 (H)             4.0 - 5.6 %           Final                  04/12/2017               5.9                 4.5 - 6.2 %           Final             ----------     Last edited by Lesli Martínez, OD on 4/12/2018 11:00 AM. (History)            Assessment /Plan     For exam results, see Encounter Report.    Encounter for fitting or adjustment of spectacles or contact lenses  Hyperopia with astigmatism and presbyopia, bilateral   Stable OD. Axis shift OS cause of visual discomfort. Pt may also need adjustment in seg height. New glasses prescription released, adaptation expected.    Eyeglass Final Rx     Eyeglass Final Rx       Sphere Cylinder Axis Add    Right +1.75 +1.00 005 +2.25    Left +1.75 +1.25 175 +2.25    Expiration Date:  4/13/2019    Comments:  Doctor's re-do                 RTC prn

## 2018-04-28 ENCOUNTER — HOSPITAL ENCOUNTER (EMERGENCY)
Facility: HOSPITAL | Age: 53
Discharge: HOME | End: 2018-04-28
Attending: SURGERY
Payer: MEDICARE

## 2018-04-28 VITALS
WEIGHT: 222 LBS | BODY MASS INDEX: 40.6 KG/M2 | HEART RATE: 87 BPM | OXYGEN SATURATION: 99 % | SYSTOLIC BLOOD PRESSURE: 138 MMHG | RESPIRATION RATE: 16 BRPM | TEMPERATURE: 99 F | DIASTOLIC BLOOD PRESSURE: 71 MMHG

## 2018-04-28 DIAGNOSIS — J00 ACUTE NASOPHARYNGITIS: Primary | ICD-10-CM

## 2018-04-28 PROCEDURE — 96372 THER/PROPH/DIAG INJ SC/IM: CPT

## 2018-04-28 PROCEDURE — 94760 N-INVAS EAR/PLS OXIMETRY 1: CPT

## 2018-04-28 PROCEDURE — 25000242 PHARM REV CODE 250 ALT 637 W/ HCPCS: Performed by: SURGERY

## 2018-04-28 PROCEDURE — 94640 AIRWAY INHALATION TREATMENT: CPT

## 2018-04-28 PROCEDURE — 63600175 PHARM REV CODE 636 W HCPCS: Performed by: SURGERY

## 2018-04-28 PROCEDURE — 99283 EMERGENCY DEPT VISIT LOW MDM: CPT | Mod: 25

## 2018-04-28 RX ORDER — ALBUTEROL SULFATE 90 UG/1
1-2 AEROSOL, METERED RESPIRATORY (INHALATION) EVERY 6 HOURS PRN
Qty: 1 INHALER | Refills: 0 | Status: SHIPPED | OUTPATIENT
Start: 2018-04-28 | End: 2019-04-28

## 2018-04-28 RX ORDER — PROMETHAZINE HYDROCHLORIDE AND DEXTROMETHORPHAN HYDROBROMIDE 6.25; 15 MG/5ML; MG/5ML
5 SYRUP ORAL EVERY 6 HOURS PRN
Qty: 118 ML | Refills: 0 | Status: SHIPPED | OUTPATIENT
Start: 2018-04-28 | End: 2018-05-03

## 2018-04-28 RX ORDER — LEVOFLOXACIN 500 MG/1
500 TABLET, FILM COATED ORAL DAILY
Qty: 7 TABLET | Refills: 0 | Status: SHIPPED | OUTPATIENT
Start: 2018-04-28 | End: 2018-05-03

## 2018-04-28 RX ORDER — CEFTRIAXONE 1 G/1
1 INJECTION, POWDER, FOR SOLUTION INTRAMUSCULAR; INTRAVENOUS
Status: COMPLETED | OUTPATIENT
Start: 2018-04-28 | End: 2018-04-28

## 2018-04-28 RX ORDER — IPRATROPIUM BROMIDE AND ALBUTEROL SULFATE 2.5; .5 MG/3ML; MG/3ML
3 SOLUTION RESPIRATORY (INHALATION)
Status: COMPLETED | OUTPATIENT
Start: 2018-04-28 | End: 2018-04-28

## 2018-04-28 RX ORDER — ALBUTEROL SULFATE 0.83 MG/ML
2.5 SOLUTION RESPIRATORY (INHALATION) EVERY 6 HOURS PRN
Qty: 1 BOX | Refills: 0 | Status: SHIPPED | OUTPATIENT
Start: 2018-04-28 | End: 2020-06-12 | Stop reason: SDUPTHER

## 2018-04-28 RX ORDER — METHYLPREDNISOLONE SOD SUCC 125 MG
80 VIAL (EA) INJECTION
Status: COMPLETED | OUTPATIENT
Start: 2018-04-28 | End: 2018-04-28

## 2018-04-28 RX ADMIN — IPRATROPIUM BROMIDE AND ALBUTEROL SULFATE 3 ML: .5; 3 SOLUTION RESPIRATORY (INHALATION) at 03:04

## 2018-04-28 RX ADMIN — METHYLPREDNISOLONE SODIUM SUCCINATE 80 MG: 125 INJECTION, POWDER, FOR SOLUTION INTRAMUSCULAR; INTRAVENOUS at 03:04

## 2018-04-28 RX ADMIN — CEFTRIAXONE 1 G: 1 INJECTION, POWDER, FOR SOLUTION INTRAMUSCULAR; INTRAVENOUS at 03:04

## 2018-04-28 NOTE — ED PROVIDER NOTES
[facility]                                                 Chief Complaint  52 y.o. 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  Patient states that she is out of her breathing treatments at home, cough today  The patient states she had a hard time sleeping last night because of the cough  Patient on exam has mild rhonchi with no active wheezing on presentation today  Patient denies any sputum production or shortness of breath, 99% RA oxygen    The history is provided by the patient    Past Medical History   -- Anxiety    -- Asthma    -- Depression    -- Diabetes mellitus    -- DVT (deep venous thrombosis)    -- Hx of psychiatric care    -- Hypertension    -- Psychiatric problem    -- Sleep difficulties       Surgeries: Carpal tunnel, hernia surgery, tubal ligation  ALLERGIES: Vitamin D analog    Review of Systems and Physical Exam      Review of Systems  -- Constitution - no fever, denies fatigue, no weakness, no chills  -- Eyes - no tearing or redness, no visual disturbance  -- Ear, Nose - no tinnitus or earache, no nasal congestion or discharge  -- Mouth,Throat - no sore throat, no toothache, normal voice, normal swallowing  -- Respiratory - cough and congestion, no shortness of breath, no CURRAN  -- Cardiovascular - denies chest pain, no palpitations, denies claudication  -- Gastrointestinal - denies abdominal pain, nausea, vomiting, or diarrhea  -- Genitourinary - no dysuria, no denies flank pain, no hematuria or frequency   -- Musculoskeletal - denies back pain, negative for myalgias and arthralgias   -- Neurological - no headache, denies weakness or seizure; no LOC  -- Skin - denies pallor, rash, or changes in skin. no hives or welts noted    /71  Pulse 94  Temp 98.7 °F (37.1 °C) (Oral)  Resp 18 SpO2 98%    Physical  Exam  -- Nursing note and vitals reviewed  -- Constitutional: Appears well-developed and well-nourished  -- Head: Atraumatic. Normocephalic. No obvious abnormality  -- Eyes: Pupils are equal and reactive to light. Normal conjunctiva and lids  -- Nose: Nose normal in appearance, nares grossly normal. No discharge  -- Throat: Mucous membranes moist, pharynx normal, normal tonsils. No lesions   -- Ears: External ears and TM normal bilaterally. Normal hearing and no drainage  -- Neck: Normal range of motion. Neck supple. No masses, trachea midline  -- Cardiac: Normal rate, regular rhythm and normal heart sounds  -- Pulmonary: faint rhonchi at the bilateral bases with no active wheezing   -- Abdominal: Soft, no tenderness. Normal bowel sounds. Normal liver edge  -- Musculoskeletal: Normal range of motion, no effusions. Joints stable   -- Neurological: No focal deficits. Showed good interaction with staff  -- Vascular: Posterior tibial, dorsalis pedis and radial pulses 2+ bilaterally      Emergency Room Course      Treatment and Evaluation  -- Chest x-ray showed no infiltrate and showed no acute pathology  -- IM 80 mg Solumedrol given today in the ER  -- IM 1 g Rocephin given today in the ER  -- Duoneb breathing treatment given today in the ER    Diagnosis  -- The encounter diagnosis was Acute nasopharyngitis.    Disposition and Plan  -- Disposition: home  -- Condition: stable  -- Follow-up: Patient to follow up with Sumi Corrigan MD in 1-2 days.  -- I advised the patient that we have found no life threatening condition today  -- At this time, I believe the patient is clinically stable for discharge.   -- The patient acknowledges that close follow up with a MD is required   -- Patient agrees to comply with all instruction and direction given in the ER    New Prescriptions    ALBUTEROL (PROVENTIL) 2.5 MG /3 ML (0.083 %) NEBULIZER SOLUTION     ALBUTEROL 90 MCG/ACTUATION INHALER     LEVOFLOXACIN (LEVAQUIN) 500 MG  TABLET     PROMETHAZINE-DEXTROMETHORPHAN (PROMETHAZINE-DM) 6.25-15 MG/5 ML SYRP      This note is dictated on Dragon Natural Speaking word recognition program.  There are word recognition mistakes that are occasionally missed on review.           Kirk Morales MD  04/28/18 1421

## 2018-04-30 ENCOUNTER — TELEPHONE (OUTPATIENT)
Dept: INTERNAL MEDICINE | Facility: CLINIC | Age: 53
End: 2018-04-30

## 2018-04-30 NOTE — TELEPHONE ENCOUNTER
Pt stated she had a lung infection and had to go ER, pt feels she is not better she said she's been getting breathing treatment since Saturday to now she said she was advised by ER to see you with in 3 days also pt was scheduled [id number] MARK

## 2018-04-30 NOTE — TELEPHONE ENCOUNTER
Please advise patient to call us (instead of going to the emergency room) for colds and flu-like symptoms. How is she doing? Does she need anything?    Thanks,  KJ

## 2018-05-03 ENCOUNTER — LAB VISIT (OUTPATIENT)
Dept: LAB | Facility: HOSPITAL | Age: 53
End: 2018-05-03
Attending: INTERNAL MEDICINE
Payer: MEDICARE

## 2018-05-03 ENCOUNTER — DOCUMENTATION ONLY (OUTPATIENT)
Dept: PHARMACY | Facility: CLINIC | Age: 53
End: 2018-05-03

## 2018-05-03 ENCOUNTER — OFFICE VISIT (OUTPATIENT)
Dept: INTERNAL MEDICINE | Facility: CLINIC | Age: 53
End: 2018-05-03
Payer: MEDICARE

## 2018-05-03 VITALS
OXYGEN SATURATION: 99 % | HEIGHT: 62 IN | HEART RATE: 90 BPM | DIASTOLIC BLOOD PRESSURE: 78 MMHG | BODY MASS INDEX: 45.19 KG/M2 | WEIGHT: 245.56 LBS | SYSTOLIC BLOOD PRESSURE: 108 MMHG

## 2018-05-03 DIAGNOSIS — J44.89 CHRONIC OBSTRUCTIVE ASTHMA: ICD-10-CM

## 2018-05-03 DIAGNOSIS — Z87.898 HISTORY OF ABNORMAL MAMMOGRAM: ICD-10-CM

## 2018-05-03 DIAGNOSIS — E11.42 DIABETIC POLYNEUROPATHY ASSOCIATED WITH TYPE 2 DIABETES MELLITUS: ICD-10-CM

## 2018-05-03 DIAGNOSIS — G47.00 INSOMNIA, UNSPECIFIED TYPE: ICD-10-CM

## 2018-05-03 DIAGNOSIS — Z12.11 COLON CANCER SCREENING: ICD-10-CM

## 2018-05-03 DIAGNOSIS — Z12.31 ENCOUNTER FOR SCREENING MAMMOGRAM FOR MALIGNANT NEOPLASM OF BREAST: ICD-10-CM

## 2018-05-03 LAB
ANION GAP SERPL CALC-SCNC: 8 MMOL/L
BUN SERPL-MCNC: 8 MG/DL
CALCIUM SERPL-MCNC: 9.8 MG/DL
CHLORIDE SERPL-SCNC: 108 MMOL/L
CHOLEST SERPL-MCNC: 194 MG/DL
CHOLEST/HDLC SERPL: 2.7 {RATIO}
CO2 SERPL-SCNC: 26 MMOL/L
CREAT SERPL-MCNC: 0.7 MG/DL
EST. GFR  (AFRICAN AMERICAN): >60 ML/MIN/1.73 M^2
EST. GFR  (NON AFRICAN AMERICAN): >60 ML/MIN/1.73 M^2
ESTIMATED AVG GLUCOSE: 117 MG/DL
GLUCOSE SERPL-MCNC: 74 MG/DL
HBA1C MFR BLD HPLC: 5.7 %
HDLC SERPL-MCNC: 73 MG/DL
HDLC SERPL: 37.6 %
LDLC SERPL CALC-MCNC: 96.8 MG/DL
NONHDLC SERPL-MCNC: 121 MG/DL
POTASSIUM SERPL-SCNC: 4 MMOL/L
SODIUM SERPL-SCNC: 142 MMOL/L
TRIGL SERPL-MCNC: 121 MG/DL

## 2018-05-03 PROCEDURE — 80048 BASIC METABOLIC PNL TOTAL CA: CPT

## 2018-05-03 PROCEDURE — 99999 PR PBB SHADOW E&M-EST. PATIENT-LVL III: CPT | Mod: PBBFAC,,, | Performed by: INTERNAL MEDICINE

## 2018-05-03 PROCEDURE — 83036 HEMOGLOBIN GLYCOSYLATED A1C: CPT

## 2018-05-03 PROCEDURE — 36415 COLL VENOUS BLD VENIPUNCTURE: CPT

## 2018-05-03 PROCEDURE — 3074F SYST BP LT 130 MM HG: CPT | Mod: CPTII,S$GLB,, | Performed by: INTERNAL MEDICINE

## 2018-05-03 PROCEDURE — 99499 UNLISTED E&M SERVICE: CPT | Mod: S$GLB,,, | Performed by: INTERNAL MEDICINE

## 2018-05-03 PROCEDURE — 3044F HG A1C LEVEL LT 7.0%: CPT | Mod: CPTII,S$GLB,, | Performed by: INTERNAL MEDICINE

## 2018-05-03 PROCEDURE — 3078F DIAST BP <80 MM HG: CPT | Mod: CPTII,S$GLB,, | Performed by: INTERNAL MEDICINE

## 2018-05-03 PROCEDURE — 3008F BODY MASS INDEX DOCD: CPT | Mod: CPTII,S$GLB,, | Performed by: INTERNAL MEDICINE

## 2018-05-03 PROCEDURE — 80061 LIPID PANEL: CPT

## 2018-05-03 PROCEDURE — 99215 OFFICE O/P EST HI 40 MIN: CPT | Mod: S$GLB,,, | Performed by: INTERNAL MEDICINE

## 2018-05-03 RX ORDER — IBUPROFEN 800 MG/1
800 TABLET ORAL EVERY 6 HOURS PRN
Qty: 90 TABLET | Refills: 3 | OUTPATIENT
Start: 2018-05-03 | End: 2019-05-30

## 2018-05-03 NOTE — ASSESSMENT & PLAN NOTE
Patient with no smoking history, unaware of pulm work-up. Questionable compliance with PETERSON, inhaled steroids. Differential includes asthma vs OHS. Symptoms improved with weight loss  · No-show to previous PFT  · reschedule  · Continue PETERSON, nasal corticosteroids  · Taking as needed  · Advised to continue losing weight  · Advised to discontinue antibiotics  · Start NSAIDs to reduce inflammation

## 2018-05-03 NOTE — ASSESSMENT & PLAN NOTE
Not caffeine related, good sleep hygiene (goes to bed at 9pm, wakes up at 5am)  · Duloxetine increased to 90mg by psychiatry  · Psychiatry started hydroxyzine at bedtime

## 2018-05-03 NOTE — PROGRESS NOTES
Adherence packed for 45 days     Morning:  ASA 81mg  Gabapentin 300mg  Duloxetine 30mg  Duloxetine 60mg to equal 90mg PO once daily  Metformin ER 500mg  Topiramate 50mg     Patient has a bottle of gabapentin 300mg at home, which she takes prn around lunchtime.     Evening:  Metformin ER 500mg  Gabapentin 300mg  Atorvastatin 40mg  Topiramate 50mg  Vitamin D3 5,000IU     Patient requested to leave hydroxyzine and ibuprofen out of pill packs.    48''

## 2018-05-03 NOTE — ASSESSMENT & PLAN NOTE
No history of colonoscopy  FitKit was given to patient in 7/2017   Never turned in  FitKit was given to patient again in 12/2017   Advised to turn in  FitKit was given to patient on 5/3/2018 10:01 AM    Advised to turn in

## 2018-05-03 NOTE — PROGRESS NOTES
Primary Care Provider Appointment    Subjective:      Patient ID: Dai Roper is a 52 y.o. female with URI and ED visit for possible COPD exacerbation    Chief Complaint: Hospital Follow Up; Cough; and Wheezing    Patient seen in ED with respiratory symptoms, with recent travel to [place], [place]. Was given numerous antibiotics in ED, but no evidence of PNA on CXR. She was also given promethazine cough syrup at bedtime.     She was seen by Dr Elliott and has follow-up with Dr Harmon. She was started on duloxetine (titrated up to 90mg), hydroxyzine at bedtime for sleep.    She checks her glucose regularly, levels around 120.    She is taking topiramate for weight loss, followed by weight loss clinic.    Last pill pack on 4/3/18:  Adherence packed for 45 days on 03/06/2018  Repackaging due on 04/20/2018.      Morning:  ASA 81mg  Gabapentin 300mg  Duloxetine 30mg  Duloxetine 60mg to equal 90mg PO once daily  Metformin ER 500mg  Topiramate 50mg     Patient has a bottle of gabapentin 300mg at home, which she takes prn around lunchtime.     Evening:  Metformin ER 500mg  Gabapentin 300mg  Atorvastatin 40mg  Topiramate 50mg  Vitamin D3 5,000IU     Patient requested to leave hydroxyzine out of pill packs.    Past Surgical History:   Procedure Laterality Date    CARPAL TUNNEL RELEASE      HERNIA REPAIR      TUBAL LIGATION         Past Medical History:   Diagnosis Date    Anxiety     Asthma     Depression     Diabetes mellitus     DVT (deep venous thrombosis)     Hx of psychiatric care     Hypertension     Psychiatric problem     Sleep difficulties        Review of Systems   Constitutional: Positive for activity change and appetite change. Negative for fever.   Cardiovascular: Positive for leg swelling.   Gastrointestinal: Negative for constipation and diarrhea.   Musculoskeletal: Positive for arthralgias, joint swelling and myalgias.   Hematological: Does not bruise/bleed easily.   Psychiatric/Behavioral:  "Positive for decreased concentration, dysphoric mood and sleep disturbance. Negative for behavioral problems and confusion. The patient is nervous/anxious.        Objective:   /78 (BP Location: Left arm, Patient Position: Sitting, BP Method: Large (Manual))   Pulse 90   Ht 5' 2" (1.575 m)   Wt 111.4 kg (245 lb 9.5 oz)   LMP 02/05/2013 (LMP Unknown)   SpO2 99%   BMI 44.92 kg/m²     Physical Exam   Constitutional: She is oriented to person, place, and time. She appears well-developed and well-nourished.   obese   HENT:   Head: Normocephalic and atraumatic.   Dental caries   Neck: Normal range of motion.   Cardiovascular: Normal rate.    Pulmonary/Chest: Effort normal.   Abdominal:   Obese abdomen   Musculoskeletal: She exhibits no edema.   Neurological: She is alert and oriented to person, place, and time.   Psychiatric: She has a normal mood and affect. Her behavior is normal. Thought content normal.   Vitals reviewed.      Lab Results   Component Value Date    WBC 7.70 08/27/2017    HGB 11.9 (L) 08/27/2017    HCT 36.9 (L) 08/27/2017     08/27/2017    CHOL 182 01/19/2018    TRIG 100 01/19/2018    HDL 71 01/19/2018    ALT 13 08/27/2017    AST 16 08/27/2017     08/27/2017    K 3.9 08/27/2017     08/27/2017    CREATININE 0.8 08/27/2017    BUN 18 08/27/2017    CO2 25 08/27/2017    TSH 0.687 06/02/2017    INR 0.9 08/28/2017    HGBA1C 5.7 (H) 01/19/2018         Assessment:   52 y.o. female with multiple co-morbid illnesses here to continue work-up of chronic issues notably URI and ED visit for possible COPD exacerbation    Plan:     Problem List Items Addressed This Visit        Neuro    Diabetic polyneuropathy associated with type 2 diabetes mellitus     A1c 5.7, compliant with metformin 500mg daily, taking gabapentin 300mg TID PRN  · F/U podiatry  · Continue gabapentin 300mg TID  · Continue metformin  · Continue duloxetine         Relevant Orders    Ambulatory Referral to Podiatry    " Hemoglobin A1c    Basic metabolic panel    Lipid panel       Pulmonary    Chronic obstructive asthma     Patient with no smoking history, unaware of pulm work-up. Questionable compliance with PETERSON, inhaled steroids. Differential includes asthma vs OHS. Symptoms improved with weight loss  · No-show to previous PFT  · reschedule  · Continue PETERSON, nasal corticosteroids  · Taking as needed  · Advised to continue losing weight  · Advised to discontinue antibiotics  · Start NSAIDs to reduce inflammation         Relevant Medications    ibuprofen (ADVIL,MOTRIN) 800 MG tablet    Other Relevant Orders    Complete PFT w/ bronchodilator       Renal/    History of abnormal mammogram     Abnormal mammo with calcifications in 5/2017, stable in 12/2017 and advised annual screening regimen  · Annual mammo due in 12/2018         Relevant Orders    Mammo Digital Screening Bilat with CAD       GI    Colon cancer screening     No history of colonoscopy  FitKit was given to patient in 7/2017   Never turned in  FitKit was given to patient again in 12/2017   Advised to turn in  FitKit was given to patient on 5/3/2018 10:01 AM    Advised to turn in         Relevant Orders    Fecal Immunochemical Test (iFOBT)       Other    Insomnia     Not caffeine related, good sleep hygiene (goes to bed at 9pm, wakes up at 5am)  · Duloxetine increased to 90mg by psychiatry  · Psychiatry started hydroxyzine at bedtime           Other Visit Diagnoses     Encounter for screening mammogram for malignant neoplasm of breast         Relevant Orders    Mammo Digital Screening Bilat with CAD          Health Maintenance       Date Due Completion Date    Colonoscopy 06/30/2013 ---iFOBT    TETANUS VACCINE 01/06/2016 1/6/2006    Foot Exam 06/22/2018 6/22/2017 (Done)- TODAY    Override on 6/22/2017: Done    Hemoglobin A1c 07/19/2018 1/19/2018- TODAY    Influenza Vaccine 08/01/2018 11/6/2017    Urine Microalbumin 08/28/2018 8/28/2017- TODAY    Lipid Panel 01/19/2019  1/19/2018- TODAY    Low Dose Statin 03/14/2019 3/14/2018- TODAY    Eye Exam 03/14/2019 3/14/2018    Pap Smear with HPV Cotest 05/11/2019 5/11/2016 (Done)    Override on 5/11/2016: Done (normal PAP at [facility])    Mammogram 12/15/2019 12/15/2017    Pneumococcal PPSV23 (Medium Risk) (2) 06/30/2028 4/3/2017          Follow-up in about 3 months (around 8/3/2018). . One hour spent with this patient today, half of that in counseling.    Sumi Corrigan MD/MPH  Internal Medicine  [facility]  [phone]

## 2018-05-03 NOTE — ASSESSMENT & PLAN NOTE
Abnormal mammo with calcifications in 5/2017, stable in 12/2017 and advised annual screening regimen  · Annual mammo due in 12/2018

## 2018-05-03 NOTE — PATIENT INSTRUCTIONS
TODAY:  -  pill packs today from pharmacy  - start ibuprofen (sent to pharmacy)  - send in fit kit for colon cancer screening  - foot doctor appt  - pulmonary function test  - stop taking the antibiotics  - mammogram due in 12/2018  - appt with Dr Wright (weight loss doctor)  - labs today  - call with me with any concerning symptoms

## 2018-05-04 ENCOUNTER — TELEPHONE (OUTPATIENT)
Dept: INTERNAL MEDICINE | Facility: CLINIC | Age: 53
End: 2018-05-04

## 2018-05-04 ENCOUNTER — HOSPITAL ENCOUNTER (OUTPATIENT)
Dept: PULMONOLOGY | Facility: CLINIC | Age: 53
Discharge: HOME | End: 2018-05-04
Payer: MEDICARE

## 2018-05-04 DIAGNOSIS — J44.89 CHRONIC OBSTRUCTIVE ASTHMA: ICD-10-CM

## 2018-05-04 NOTE — TELEPHONE ENCOUNTER
Please let patient know that her A1c Is controlled at 5.7 and cholesterol is stable. She should continue taking all of her meds    Thanks,  ELOINA

## 2018-05-31 ENCOUNTER — OFFICE VISIT (OUTPATIENT)
Dept: PODIATRY | Facility: CLINIC | Age: 53
End: 2018-05-31
Payer: MEDICARE

## 2018-05-31 VITALS
BODY MASS INDEX: 45.27 KG/M2 | SYSTOLIC BLOOD PRESSURE: 125 MMHG | HEIGHT: 62 IN | WEIGHT: 246 LBS | DIASTOLIC BLOOD PRESSURE: 76 MMHG | RESPIRATION RATE: 18 BRPM | HEART RATE: 74 BPM

## 2018-05-31 DIAGNOSIS — E11.49 TYPE II DIABETES MELLITUS WITH NEUROLOGICAL MANIFESTATIONS: Primary | ICD-10-CM

## 2018-05-31 DIAGNOSIS — B35.3 TINEA PEDIS OF BOTH FEET: ICD-10-CM

## 2018-05-31 PROCEDURE — 3074F SYST BP LT 130 MM HG: CPT | Mod: CPTII,S$GLB,, | Performed by: PODIATRIST

## 2018-05-31 PROCEDURE — 99213 OFFICE O/P EST LOW 20 MIN: CPT | Mod: S$GLB,,, | Performed by: PODIATRIST

## 2018-05-31 PROCEDURE — 3008F BODY MASS INDEX DOCD: CPT | Mod: CPTII,S$GLB,, | Performed by: PODIATRIST

## 2018-05-31 PROCEDURE — 3078F DIAST BP <80 MM HG: CPT | Mod: CPTII,S$GLB,, | Performed by: PODIATRIST

## 2018-05-31 PROCEDURE — 99499 UNLISTED E&M SERVICE: CPT | Mod: S$GLB,,, | Performed by: PODIATRIST

## 2018-05-31 PROCEDURE — 99999 PR PBB SHADOW E&M-EST. PATIENT-LVL III: CPT | Mod: PBBFAC,,, | Performed by: PODIATRIST

## 2018-05-31 PROCEDURE — 3044F HG A1C LEVEL LT 7.0%: CPT | Mod: CPTII,S$GLB,, | Performed by: PODIATRIST

## 2018-05-31 RX ORDER — KETOCONAZOLE 20 MG/G
CREAM TOPICAL DAILY
Qty: 60 G | Refills: 5 | Status: SHIPPED | OUTPATIENT
Start: 2018-05-31 | End: 2020-06-12 | Stop reason: SDUPTHER

## 2018-05-31 RX ORDER — HYDROCODONE BITARTRATE AND ACETAMINOPHEN 10; 325 MG/1; MG/1
TABLET ORAL
COMMUNITY
Start: 2018-05-23 | End: 2018-12-21

## 2018-05-31 RX ORDER — PENICILLIN V POTASSIUM 500 MG/1
TABLET, FILM COATED ORAL
COMMUNITY
Start: 2018-05-17 | End: 2018-12-21

## 2018-05-31 NOTE — PROGRESS NOTES
Subjective:      Patient ID: Dai Roper is a 52 y.o. female.    Chief Complaint: PCP (Sumi Corrigan MD 5/03/18); Nail Problem; Nail Care; and Diabetic Foot Exam    Dai is a 52 y.o. female who presents to the clinic upon referral from Dr. Corrigan  for evaluation and treatment of diabetic feet. Dai has a past medical history of Anxiety; Asthma; Depression; Diabetes mellitus; DVT (deep venous thrombosis); psychiatric care; Hypertension; Psychiatric problem; and Sleep difficulties. Patient relates no major problem with feet. Only complaints today consist of yearly DM foot examination  .    PCP: Sumi Corrigan MD    Date Last Seen by PCP:   Chief Complaint   Patient presents with    PCP     Sumi Corrigan MD 5/03/18    Nail Problem    Nail Care    Diabetic Foot Exam         Current shoe gear: Casual shoes    Hemoglobin A1C   Date Value Ref Range Status   05/03/2018 5.7 (H) 4.0 - 5.6 % Final     Comment:     According to ADA guidelines, hemoglobin A1c <7.0% represents  optimal control in non-pregnant diabetic patients. Different  metrics may apply to specific patient populations.   Standards of Medical Care in Diabetes-2016.  For the purpose of screening for the presence of diabetes:  <5.7%     Consistent with the absence of diabetes  5.7-6.4%  Consistent with increasing risk for diabetes   (prediabetes)  >or=6.5%  Consistent with diabetes  Currently, no consensus exists for use of hemoglobin A1c  for diagnosis of diabetes for children.  This Hemoglobin A1c assay has significant interference with fetal   hemoglobin   (HbF). The results are invalid for patients with abnormal amounts of   HbF,   including those with known Hereditary Persistence   of Fetal Hemoglobin. Heterozygous hemoglobin variants (HbAS, HbAC,   HbAD, HbAE, HbA2) do not significantly interfere with this assay;   however, presence of multiple variants in a sample may impact the %   interference.      01/19/2018 5.7 (H) 4.0 - 5.6 % Final     Comment:     According to ADA guidelines, hemoglobin A1c <7.0% represents  optimal control in non-pregnant diabetic patients. Different  metrics may apply to specific patient populations.   Standards of Medical Care in Diabetes-2016.  For the purpose of screening for the presence of diabetes:  <5.7%     Consistent with the absence of diabetes  5.7-6.4%  Consistent with increasing risk for diabetes   (prediabetes)  >or=6.5%  Consistent with diabetes  Currently, no consensus exists for use of hemoglobin A1c  for diagnosis of diabetes for children.  This Hemoglobin A1c assay has significant interference with fetal   hemoglobin   (HbF). The results are invalid for patients with abnormal amounts of   HbF,   including those with known Hereditary Persistence   of Fetal Hemoglobin. Heterozygous hemoglobin variants (HbAS, HbAC,   HbAD, HbAE, HbA2) do not significantly interfere with this assay;   however, presence of multiple variants in a sample may impact the %   interference.     07/11/2017 5.7 (H) 4.0 - 5.6 % Final     Comment:     According to ADA guidelines, hemoglobin A1c <7.0% represents  optimal control in non-pregnant diabetic patients. Different  metrics may apply to specific patient populations.   Standards of Medical Care in Diabetes-2016.  For the purpose of screening for the presence of diabetes:  <5.7%     Consistent with the absence of diabetes  5.7-6.4%  Consistent with increasing risk for diabetes   (prediabetes)  >or=6.5%  Consistent with diabetes  Currently, no consensus exists for use of hemoglobin A1c  for diagnosis of diabetes for children.  This Hemoglobin A1c assay has significant interference with fetal   hemoglobin   (HbF). The results are invalid for patients with abnormal amounts of   HbF,   including those with known Hereditary Persistence   of Fetal Hemoglobin. Heterozygous hemoglobin variants (HbAS, HbAC,   HbAD, HbAE, HbA2) do not significantly  "interfere with this assay;   however, presence of multiple variants in a sample may impact the %   interference.             Review of Systems   Constitution: Negative for chills, decreased appetite and fever.   Cardiovascular: Negative for leg swelling.   Skin: Positive for dry skin.   Musculoskeletal: Negative for arthritis, joint pain, joint swelling and myalgias.   Gastrointestinal: Negative for nausea and vomiting.   Neurological: Negative for loss of balance, numbness and paresthesias.           Objective:       Vitals:    05/31/18 1147 05/31/18 1151   BP:  125/76   Pulse:  74   Resp: 18    Weight: 111.6 kg (246 lb)    Height: 5' 2" (1.575 m)    PainSc: 0-No pain         Physical Exam   Constitutional: She appears well-developed and well-nourished.  Non-toxic appearance. She does not have a sickly appearance. No distress.   alert and oriented x 3.    Cardiovascular:   Pulses:       Dorsalis pedis pulses are 2+ on the right side, and 2+ on the left side.        Posterior tibial pulses are 2+ on the right side, and 2+ on the left side.    Capillary refill time is within normal limits. Digital hair present.    Pulmonary/Chest: No respiratory distress.   Musculoskeletal: She exhibits no deformity.        Right ankle: No tenderness. No lateral malleolus, no medial malleolus, no AITFL, no CF ligament and no posterior TFL tenderness found. Achilles tendon exhibits no pain, no defect and normal Kaufman's test results.        Left ankle: No tenderness. No lateral malleolus, no medial malleolus, no AITFL, no CF ligament and no posterior TFL tenderness found. Achilles tendon exhibits no pain, no defect and normal Kaufman's test results.        Right foot: There is no tenderness and no bony tenderness.        Left foot: There is no tenderness and no bony tenderness.   Adequate joint range of motion without pain, limitation, nor crepitation Bilateral feet and ankle joints. Muscle strength is 5/5 in all groups " bilaterally.           Feet:   Right Foot:   Protective Sensation: 5 sites tested. 5 sites sensed.   Left Foot:   Protective Sensation: 5 sites tested. 5 sites sensed.   Lymphadenopathy:   No lymphatic streaking     Neurological: She displays no atrophy. No sensory deficit.   Light touch present     Skin: Skin is warm, dry and intact. No rash noted. She is not diaphoretic. No cyanosis. No pallor. Nails show no clubbing.   Skin is of normal turgor.   Normal temperature gradient.  Examination of the skin reveals no evidence of significant rashes, open lesions, suspicious appearing nevi or other concerning lesions.       Toenails 1-5 bilaterally are neatly trimmed; of normal color and thickness    Scaling dryness in a moccasin distribution is noted to the bilateral lower extremities with associated erythema.             Psychiatric: Her mood appears not anxious. Her affect is not inappropriate. Her speech is not slurred. She is not combative. She is communicative. She is attentive.   Nursing note and vitals reviewed.            Assessment:       Encounter Diagnoses   Name Primary?    Type II diabetes mellitus with neurological manifestations Yes    Tinea pedis of both feet          Plan:       Dai was seen today for pcp, nail problem, nail care and diabetic foot exam.    Diagnoses and all orders for this visit:    Type II diabetes mellitus with neurological manifestations    Tinea pedis of both feet    Other orders  -     ketoconazole (NIZORAL) 2 % cream; Apply topically once daily.      I counseled the patient on her conditions, their implications and medical management.      - Shoe inspection. Diabetic Foot Education. Patient reminded of the importance of good nutrition and blood sugar control to help prevent podiatric complications of diabetes. Patient instructed on proper foot hygeine. We discussed wearing proper shoe gear, daily foot inspections, never walking without protective shoe gear, caution putting  sharp instruments to feet     - Discussed DM foot care:  Wear comfortable, proper fitting shoes. Wash feet daily. Dry well. After drying, apply moisturizer to feet (no lotion to webspaces). Inspect feet daily for skin breaks, blisters, swelling, or redness. Wear cotton socks (preferably white)  Change socks every day. Do NOT walk barefoot. Do NOT use heating pads or warm/hot water soaks     - Discussed importance of daily moisturizer to the feet such as Gold bonds diabetic foot cream    - Patient is low risk for developing lower extremity issues secondary to diabetes    - RTC in  1 year for a diabetic foot exam  or sooner if problems    .

## 2018-05-31 NOTE — LETTER
May 31, 2018      Sumi Corrigan MD  [address]  [facility] [address]           [facility] - Podiatry  [address]  [facility] [address]  Phone: [phone]          Patient: Dai Roper   MR Number: [id number]   YOB: 1965   Date of Visit: 5/31/2018       Dear Dr. Sumi Corrigan:    Thank you for referring Dai Roper to me for evaluation. Attached you will find relevant portions of my assessment and plan of care.    If you have questions, please do not hesitate to call me. I look forward to following Dai Roper along with you.    Sincerely,    Shyanne Reeder, ILA    Enclosure  CC:  No Recipients    If you would like to receive this communication electronically, please contact [email or web address] or [phone] to request more information on [employer] Link access.    For providers and/or their staff who would like to refer a patient to [facility], please contact us through our one-stop-shop provider referral line, [facility], at [phone].    If you feel you have received this communication in error or would no longer like to receive these types of communications, please e-mail [email or web address]

## 2018-06-22 ENCOUNTER — DOCUMENTATION ONLY (OUTPATIENT)
Dept: PHARMACY | Facility: CLINIC | Age: 53
End: 2018-06-22

## 2018-06-22 ENCOUNTER — TELEPHONE (OUTPATIENT)
Dept: INTERNAL MEDICINE | Facility: CLINIC | Age: 53
End: 2018-06-22

## 2018-06-22 ENCOUNTER — PES CALL (OUTPATIENT)
Dept: ADMINISTRATIVE | Facility: CLINIC | Age: 53
End: 2018-06-22

## 2018-06-22 DIAGNOSIS — F33.1 MODERATE EPISODE OF RECURRENT MAJOR DEPRESSIVE DISORDER: Primary | ICD-10-CM

## 2018-06-22 RX ORDER — DULOXETIN HYDROCHLORIDE 30 MG/1
30 CAPSULE, DELAYED RELEASE ORAL DAILY
Qty: 90 CAPSULE | Refills: 3 | Status: SHIPPED | OUTPATIENT
Start: 2018-06-22 | End: 2018-12-21 | Stop reason: SDUPTHER

## 2018-06-22 NOTE — TELEPHONE ENCOUNTER
----- Message from Nieves Boone MA sent at 6/22/2018  8:20 AM CDT -----  Regarding: FW: Cymbalta rx request      ----- Message -----  From: Vani Gunn PharmD  Sent: 6/22/2018   8:05 AM  To: Meenu Carrillo Staff  Subject: Cymbalta rx request                              Good morning Dr DUVALL!    Patient's current daily dose of cymbalta is 90mg. We have an rx on file for 30mg to take TID, but the patient's insurance will only cover a max of 2 capsules per day. Can you send a new script over for cymbalta 30mg take one capsule once a day with 60mg for a TDD of 90mg? We already have a script for the 60mg capsules on file and we just need the 30mg script. Thanks!     -Crystal

## 2018-06-22 NOTE — PROGRESS NOTES
Adherence packed for 28 days in EZY dose Weekly packs     Morning:  ASA 81mg  Gabapentin 300mg  Duloxetine 30mg  Duloxetine 60mg to equal 90mg PO once daily  Metformin ER 500mg  Topiramate 50mg     Noon:  Gabapentin 300mg       Evening:  Metformin ER 500mg  Gabapentin 300mg  Atorvastatin 40mg  Topiramate 50mg  Vitamin D3 5,000IU     Patient requested to leave hydroxyzine and ibuprofen out of pill packs.    30''

## 2018-07-09 NOTE — PROGRESS NOTES
Adherence packed for 14 days on 01/23/2018.  Repackaging due on 02/06/2018.      Morning:  Gabapentin 300mg  Duloxetine 30mg x2  Metformin ER 500mg  Topiramate 50mg     Lunch:  Gabapentin 300mg  *Per patient's request we did not prepare this pack today. Patient has plenty of doses left at home.     Evening:  Metformin ER 500mg  Gabapentin 300mg  Atorvastatin 40mg  Topiramate 50mg    Lisinopril-HCTZ 20-12.5mg kept out of pill packs per KJ request. Patient BP has been low. Patient due for a follow up in two weeks with PCP.   Patient contacted. He states needs the work letter 7/6/18 to be updated to reflect additional information his employer's short term disability needs included:    1. Area of injury to body  2. Treatment plan such as any use of medications    Fax to [phone] then call patient to let him know done. Okay to leave voice message.    Cristi Bell CMA

## 2018-08-16 ENCOUNTER — DOCUMENTATION ONLY (OUTPATIENT)
Dept: PHARMACY | Facility: CLINIC | Age: 53
End: 2018-08-16

## 2018-08-16 NOTE — PROGRESS NOTES
Adherence packed for 28 days in EZY dose Weekly packs     Morning:  ASA 81mg  Duloxetine 30mg  Duloxetine 60mg to equal 90mg PO once daily  Metformin ER 500mg  Topiramate 50mg     Evening:  Metformin ER 500mg  Atorvastatin 40mg  Topiramate 50mg  Vitamin D3 5,000IU     Patient reports no longer taking gabapentin.     25''

## 2018-08-28 ENCOUNTER — OFFICE VISIT (OUTPATIENT)
Dept: OPTOMETRY | Facility: CLINIC | Age: 53
End: 2018-08-28
Payer: MEDICARE

## 2018-08-28 DIAGNOSIS — H00.025 HORDEOLUM INTERNUM LEFT LOWER EYELID: Primary | ICD-10-CM

## 2018-08-28 PROCEDURE — 92012 INTRM OPH EXAM EST PATIENT: CPT | Mod: S$GLB,,, | Performed by: OPTOMETRIST

## 2018-08-28 PROCEDURE — 99999 PR PBB SHADOW E&M-EST. PATIENT-LVL III: CPT | Mod: PBBFAC,,, | Performed by: OPTOMETRIST

## 2018-08-28 NOTE — PATIENT INSTRUCTIONS
You have a hordeolum, or stye, which is an infection of one of the glands in your eyelids. Please use warm compresses for 10 minutes, at least 3-4 times per day.   Heat a wash cloth by running it under hot water. Then hold this wash cloth over your closed eyelids and allow your eyelids to absorb the heat. After 20-30 seconds once the wash cloth cools down you'll need to heat it up again.  (An alternative heat source is a gel pack warmed in the mircrowave or in a dish of water,  wrapped with a warm wet washcloth).     If you use warm compresses consistently then the hordeolum should resolve in 1-2 weeks. If your symptoms get worse, the redness spreads across your entire eyelid, or you develop any vision changes, please let us know immediately.     ==============================================    If you have any problems after hours or over the weekend, please call the [facility]  at [phone] and ask that the on-call Ophthalmologist be paged.

## 2018-08-28 NOTE — PROGRESS NOTES
HPI     Ms. Dai Roper is here for an urgent visit.    Patient complains of waking up this morning with her left lower lid being   swollen pain level 8, OS tearing. LLL has been a little itchy and sore for   a few days. No vision changes. OD asymptomatic.     (-)drops  (-)flashes  (-)floaters  (-)diplopia     OCULAR HISTORY  Last Eye Exam 04/12/2018 with Dr. Martínez   (-)eye surgery   Dry eyes  Cataracts OU  Internal hordolum ADEN (03/2018)    FAMILY HISTORY  (-)Glaucoma none         Last edited by Lesli Martínez, OD on 8/28/2018 12:51 PM. (History)            Assessment /Plan     For exam results, see Encounter Report.    Hordeolum internum left lower eyelid   Recommended warm compresses for 10 minutes at least QID. Avoid rubbing eyes. Explained why oral antibiotics are not needed at this time. Advised pt to RTC immediately if symptoms worsen, vision changes, or fever develops.    To prevent recurrent styes in the future, use lid scrubs BID, warm compresses regularly, and avoid touching eyes. If any lid tenderness develops, start warm compresses immediately.      RTC prn

## 2018-09-04 ENCOUNTER — PES CALL (OUTPATIENT)
Dept: ADMINISTRATIVE | Facility: CLINIC | Age: 53
End: 2018-09-04

## 2018-09-26 RX ORDER — TOPIRAMATE 50 MG/1
50 TABLET, FILM COATED ORAL 2 TIMES DAILY
Qty: 180 TABLET | Refills: 3 | Status: SHIPPED | OUTPATIENT
Start: 2018-09-26 | End: 2018-12-21 | Stop reason: SDUPTHER

## 2018-09-26 NOTE — TELEPHONE ENCOUNTER
Refill for topiramate sent to [facility].    Patient needs to be seen in clinic. She no-showed to her last appt, please make sure she has follow-up.    Thanks,  KJ

## 2018-10-02 ENCOUNTER — DOCUMENTATION ONLY (OUTPATIENT)
Dept: PHARMACY | Facility: CLINIC | Age: 53
End: 2018-10-02

## 2018-10-02 NOTE — PROGRESS NOTES
Adherence packed for 28 days in EZY dose weekly packs on 09/27.     Morning:  ASA 81mg  Duloxetine 30mg  Duloxetine 60mg to equal 90mg PO once daily  Metformin ER 500mg  Topiramate 50mg     Evening:  Metformin ER 500mg  Atorvastatin 40mg  Topiramate 50mg  Vitamin D3 5,000IU     Do not include gabapentin per pt request.     25''

## 2018-10-09 ENCOUNTER — HOSPITAL ENCOUNTER (EMERGENCY)
Facility: HOSPITAL | Age: 53
Discharge: HOME | End: 2018-10-10
Attending: EMERGENCY MEDICINE
Payer: MEDICARE

## 2018-10-09 VITALS
BODY MASS INDEX: 36.58 KG/M2 | SYSTOLIC BLOOD PRESSURE: 139 MMHG | HEART RATE: 80 BPM | WEIGHT: 200 LBS | TEMPERATURE: 98 F | RESPIRATION RATE: 20 BRPM | DIASTOLIC BLOOD PRESSURE: 75 MMHG | OXYGEN SATURATION: 100 %

## 2018-10-09 DIAGNOSIS — K52.9 GASTROENTERITIS: Primary | ICD-10-CM

## 2018-10-09 PROCEDURE — 25000003 PHARM REV CODE 250: Performed by: PHYSICIAN ASSISTANT

## 2018-10-09 PROCEDURE — 96374 THER/PROPH/DIAG INJ IV PUSH: CPT

## 2018-10-09 PROCEDURE — 99284 EMERGENCY DEPT VISIT MOD MDM: CPT | Mod: 25

## 2018-10-09 PROCEDURE — 63600175 PHARM REV CODE 636 W HCPCS: Performed by: PHYSICIAN ASSISTANT

## 2018-10-09 PROCEDURE — 96361 HYDRATE IV INFUSION ADD-ON: CPT

## 2018-10-09 RX ORDER — ONDANSETRON 2 MG/ML
4 INJECTION INTRAMUSCULAR; INTRAVENOUS
Status: COMPLETED | OUTPATIENT
Start: 2018-10-09 | End: 2018-10-09

## 2018-10-09 RX ORDER — ONDANSETRON 4 MG/1
4 TABLET, ORALLY DISINTEGRATING ORAL EVERY 6 HOURS PRN
Qty: 15 TABLET | Refills: 0 | Status: SHIPPED | OUTPATIENT
Start: 2018-10-09 | End: 2020-06-12

## 2018-10-09 RX ADMIN — SODIUM CHLORIDE 1000 ML: 0.9 INJECTION, SOLUTION INTRAVENOUS at 04:10

## 2018-10-09 RX ADMIN — ONDANSETRON HYDROCHLORIDE 4 MG: 2 INJECTION, SOLUTION INTRAMUSCULAR; INTRAVENOUS at 04:10

## 2018-10-09 NOTE — ED PROVIDER NOTES
Encounter Date: 10/9/2018       History     Chief Complaint   Patient presents with    Abdominal Pain     n/v/d x 1 day     54 yo female presents to the Ed with complaints of nausea, vomting, and diarrhea x 2 days. Patient states she started with nausea and 3 episodes of vomiting yesterday, had 1 episode of vomiting today, and 3 episode of diarrhea today. She was unable to keep anything down yesterday, but has kept water down today. She also admits to cramping pain in her lower abdomen. Denies fever, blood in stool,       The history is provided by the patient. No  was used.     Review of patient's allergies indicates:   Allergen Reactions    Vitamin d analogue Edema     NOT allergy, but reaction to high dose vitamin D     Past Medical History:   Diagnosis Date    Anxiety     Asthma     Depression     Diabetes mellitus     DVT (deep venous thrombosis)     Hx of psychiatric care     Hypertension     Psychiatric problem     Sleep difficulties      Past Surgical History:   Procedure Laterality Date    CARPAL TUNNEL RELEASE      HERNIA REPAIR      TUBAL LIGATION       Family History   Problem Relation Age of Onset    Diabetes Mother     Heart disease Mother     Hypertension Mother     Lupus Mother     Depression Mother     Anxiety disorder Mother     Heart disease Father     Hypertension Father     Hypertension Sister     Lupus Sister     No Known Problems Brother     Anxiety disorder Daughter     Depression Daughter     Depression Son     Anxiety disorder Son     Hypertension Sister     Hypertension Sister      Social History     Tobacco Use    Smoking status: Never Smoker    Smokeless tobacco: Never Used   Substance Use Topics    Alcohol use: No    Drug use: No     Review of Systems   Constitutional: Negative for chills and fever.   Gastrointestinal: Positive for abdominal pain, diarrhea, nausea and vomiting.   All other systems reviewed and are  negative.      Physical Exam     Initial Vitals [10/09/18 1531]   BP Pulse Resp Temp SpO2   139/75 80 20 98.2 °F (36.8 °C) 100 %      MAP       --         Physical Exam    Nursing note and vitals reviewed.  Constitutional: Vital signs are normal. She appears well-developed and well-nourished. No distress.   HENT:   Head: Normocephalic and atraumatic.   Nose: Nose normal.   Eyes: Conjunctivae and lids are normal.   Neck: Normal range of motion and phonation normal.   Cardiovascular: Normal rate, regular rhythm and normal heart sounds.   Pulmonary/Chest: Effort normal and breath sounds normal.   Abdominal: Soft. Normal appearance and bowel sounds are normal. She exhibits no distension. There is no tenderness. There is no rigidity and no guarding.   Musculoskeletal: Normal range of motion.   Neurological: She is alert and oriented to person, place, and time.   Skin: Skin is warm and dry.   Psychiatric: She has a normal mood and affect. Her speech is normal and behavior is normal. Judgment and thought content normal. Cognition and memory are normal.         ED Course   Procedures  Labs Reviewed - No data to display       Imaging Results    None          Medical Decision Making:   Initial Assessment:   53-year-old female with generalized crampy abdominal pain, nausea, vomiting, diarrhea x2 days.  Patient had no episodes of diarrhea or vomiting while in the ED.  Abdomen is soft and nontender with normal bowel sounds. Patient is afebrile in no acute distress with stable vital signs.  Differential Diagnosis:   Viral gastroenteritis.  ED Management:  IV fluids and Zofran given in ED.  Patient states improvement in symptoms following fluids and meds.  P.o. challenge past.  She will be discharged with Rx for Zofran and instructions to increase oral hydration and advance diet as tolerated.  Follow up with PCP as soon as possible.              Attending Attestation:     Physician Attestation Statement for NP/PA:   I discussed  this assessment and plan of this patient with the NP/PA, but I did not personally examine the patient. The face to face encounter was performed by the NP/PA.                     Clinical Impression:   The encounter diagnosis was Gastroenteritis.                             Linda Reyes PA-C  10/09/18 0882       Larry Arreola DO  10/10/18 6429

## 2018-10-10 ENCOUNTER — TELEPHONE (OUTPATIENT)
Dept: INTERNAL MEDICINE | Facility: CLINIC | Age: 53
End: 2018-10-10

## 2018-10-10 NOTE — TELEPHONE ENCOUNTER
Patient seen in ED for nausea/vomiting. Please advise patient to come to clinic for symptoms like that. We can help her with medications and IV fluids. It's a lot cheaper and faster than going to the ED.    When can she come in to be seen?    Thanks,  ELOINA

## 2018-11-09 DIAGNOSIS — E11.8 TYPE 2 DIABETES MELLITUS WITH COMPLICATION, WITHOUT LONG-TERM CURRENT USE OF INSULIN: ICD-10-CM

## 2018-11-09 DIAGNOSIS — I15.2 HYPERTENSION ASSOCIATED WITH DIABETES: ICD-10-CM

## 2018-11-09 DIAGNOSIS — E11.59 HYPERTENSION ASSOCIATED WITH DIABETES: ICD-10-CM

## 2018-11-09 DIAGNOSIS — E66.01 MORBID OBESITY DUE TO EXCESS CALORIES: ICD-10-CM

## 2018-11-09 RX ORDER — METFORMIN HYDROCHLORIDE 500 MG/1
500 TABLET, EXTENDED RELEASE ORAL 2 TIMES DAILY WITH MEALS
Qty: 180 TABLET | Refills: 3 | Status: SHIPPED | OUTPATIENT
Start: 2018-11-09 | End: 2018-12-21 | Stop reason: SDUPTHER

## 2018-11-09 RX ORDER — METFORMIN HYDROCHLORIDE 500 MG/1
500 TABLET, EXTENDED RELEASE ORAL 2 TIMES DAILY WITH MEALS
Qty: 180 TABLET | Refills: 3 | Status: CANCELLED | OUTPATIENT
Start: 2018-11-09 | End: 2019-11-09

## 2018-11-09 NOTE — TELEPHONE ENCOUNTER
Metformin refill sent to [facility].    diann needs follow-up she no-showed to last appt.    Thanks,  KJ

## 2018-12-03 ENCOUNTER — HOSPITAL ENCOUNTER (OUTPATIENT)
Dept: RADIOLOGY | Facility: HOSPITAL | Age: 53
Discharge: HOME | End: 2018-12-03
Attending: INTERNAL MEDICINE
Payer: MEDICARE

## 2018-12-03 VITALS — HEIGHT: 62 IN | WEIGHT: 200 LBS | BODY MASS INDEX: 36.8 KG/M2

## 2018-12-03 DIAGNOSIS — Z87.898 HISTORY OF ABNORMAL MAMMOGRAM: ICD-10-CM

## 2018-12-03 DIAGNOSIS — Z12.31 ENCOUNTER FOR SCREENING MAMMOGRAM FOR MALIGNANT NEOPLASM OF BREAST: ICD-10-CM

## 2018-12-03 PROCEDURE — 77067 SCR MAMMO BI INCL CAD: CPT | Mod: 26,HCWC,, | Performed by: RADIOLOGY

## 2018-12-03 PROCEDURE — 77067 SCR MAMMO BI INCL CAD: CPT | Mod: TC,HCWC

## 2018-12-04 ENCOUNTER — TELEPHONE (OUTPATIENT)
Dept: INTERNAL MEDICINE | Facility: CLINIC | Age: 53
End: 2018-12-04

## 2018-12-04 ENCOUNTER — TELEPHONE (OUTPATIENT)
Dept: RADIOLOGY | Facility: HOSPITAL | Age: 53
End: 2018-12-04

## 2018-12-04 ENCOUNTER — TELEPHONE (OUTPATIENT)
Dept: PRIMARY CARE CLINIC | Facility: CLINIC | Age: 53
End: 2018-12-04

## 2018-12-04 NOTE — TELEPHONE ENCOUNTER
Please let joselyn know that on additional review the L breast had calcifications. The radiology team has already reached out to her about this, they will perform an additional test on Friday at 8:45am.    Thanks,  KJ

## 2018-12-04 NOTE — TELEPHONE ENCOUNTER
Yash Luna LPN routed conversation to Meenu Carrillo Staff 10 minutes ago (10:46 AM)      Yash Luna LPN 10 minutes ago (10:46 AM)         Spoke with patient and explained mammogram findings.Patient expressed understanding of results. Patient is scheduled for a abnormal mammogram follow up appointment at [facility] on 12/7/18         Documentation       Yash Luna LPN

## 2018-12-04 NOTE — TELEPHONE ENCOUNTER
Spoke with patient and explained mammogram findings.Patient expressed understanding of results. Patient is scheduled for a abnormal mammogram follow up appointment at [facility] on 12/7/18

## 2018-12-07 ENCOUNTER — TELEPHONE (OUTPATIENT)
Dept: INTERNAL MEDICINE | Facility: CLINIC | Age: 53
End: 2018-12-07

## 2018-12-07 ENCOUNTER — HOSPITAL ENCOUNTER (OUTPATIENT)
Dept: RADIOLOGY | Facility: HOSPITAL | Age: 53
Discharge: HOME | End: 2018-12-07
Attending: INTERNAL MEDICINE
Payer: MEDICARE

## 2018-12-07 DIAGNOSIS — R92.8 ABNORMAL MAMMOGRAM: ICD-10-CM

## 2018-12-07 PROCEDURE — 77061 BREAST TOMOSYNTHESIS UNI: CPT | Mod: 26,HCWC,LT, | Performed by: RADIOLOGY

## 2018-12-07 PROCEDURE — 77065 DX MAMMO INCL CAD UNI: CPT | Mod: 26,HCWC,LT, | Performed by: RADIOLOGY

## 2018-12-07 PROCEDURE — 77065 DX MAMMO INCL CAD UNI: CPT | Mod: TC,HCWC,PO,LT

## 2018-12-07 PROCEDURE — 77061 BREAST TOMOSYNTHESIS UNI: CPT | Mod: TC,HCWC,PO,LT

## 2018-12-07 NOTE — TELEPHONE ENCOUNTER
Patient had abnormal repeat mammogram and has plans for a biopsy. Please check on her and see how she's doing, and if she wants to be seen by us.    Thanks,  KJ

## 2018-12-10 ENCOUNTER — TELEPHONE (OUTPATIENT)
Dept: RADIOLOGY | Facility: HOSPITAL | Age: 53
End: 2018-12-10

## 2018-12-10 NOTE — TELEPHONE ENCOUNTER
Spoke with patient. Reviewed breast biopsy procedure and reviewed instructions for breast biopsy. Patient expressed understanding and all questions were answered. Provided patient with my phone number to call for any further concerns or questions.   Patient scheduled breast biopsy at the [facility] on 12/18/18

## 2018-12-10 NOTE — TELEPHONE ENCOUNTER
Called patient in reference to her mammogram and scheduling a breast biopsy . No answer. Unable to leave message patient does no have a voice mail box.

## 2018-12-18 ENCOUNTER — HOSPITAL ENCOUNTER (OUTPATIENT)
Dept: RADIOLOGY | Facility: HOSPITAL | Age: 53
Discharge: HOME | End: 2018-12-18
Attending: INTERNAL MEDICINE
Payer: MEDICARE

## 2018-12-18 DIAGNOSIS — R92.8 ABNORMAL MAMMOGRAM: ICD-10-CM

## 2018-12-18 PROCEDURE — 19081 BX BREAST 1ST LESION STRTCTC: CPT | Mod: PO,LT

## 2018-12-18 PROCEDURE — 88305 TISSUE EXAM BY PATHOLOGIST: CPT | Performed by: PATHOLOGY

## 2018-12-18 PROCEDURE — 27201044 MAMMO BREAST STEREOTACTIC BREAST BIOPSY LEFT: Mod: PO

## 2018-12-18 PROCEDURE — 19081 BX BREAST 1ST LESION STRTCTC: CPT | Mod: LT,,, | Performed by: RADIOLOGY

## 2018-12-19 ENCOUNTER — TELEPHONE (OUTPATIENT)
Dept: SURGERY | Facility: CLINIC | Age: 53
End: 2018-12-19

## 2018-12-19 NOTE — TELEPHONE ENCOUNTER
no atypia/benign, all questions answered, pt advised to follow up in 6 months with repeat imaging per core biopsy protocol, advised case will be reviewed in the weekly core conference and pt will be notified if there are any changes

## 2018-12-21 ENCOUNTER — TELEPHONE (OUTPATIENT)
Dept: INTERNAL MEDICINE | Facility: CLINIC | Age: 53
End: 2018-12-21

## 2018-12-21 ENCOUNTER — OFFICE VISIT (OUTPATIENT)
Dept: PRIMARY CARE CLINIC | Facility: CLINIC | Age: 53
End: 2018-12-21
Payer: MEDICARE

## 2018-12-21 ENCOUNTER — IMMUNIZATION (OUTPATIENT)
Dept: INTERNAL MEDICINE | Facility: CLINIC | Age: 53
End: 2018-12-21
Payer: MEDICARE

## 2018-12-21 VITALS
OXYGEN SATURATION: 99 % | BODY MASS INDEX: 44.83 KG/M2 | HEIGHT: 62 IN | DIASTOLIC BLOOD PRESSURE: 62 MMHG | SYSTOLIC BLOOD PRESSURE: 96 MMHG | HEART RATE: 69 BPM | WEIGHT: 243.63 LBS

## 2018-12-21 DIAGNOSIS — E11.59 HYPERTENSION ASSOCIATED WITH DIABETES: ICD-10-CM

## 2018-12-21 DIAGNOSIS — E66.01 MORBID OBESITY DUE TO EXCESS CALORIES: ICD-10-CM

## 2018-12-21 DIAGNOSIS — R63.5 ABNORMAL WEIGHT GAIN: ICD-10-CM

## 2018-12-21 DIAGNOSIS — Z87.898 HISTORY OF ABNORMAL MAMMOGRAM: ICD-10-CM

## 2018-12-21 DIAGNOSIS — E55.9 VITAMIN D DEFICIENCY: ICD-10-CM

## 2018-12-21 DIAGNOSIS — E11.69 HYPERLIPIDEMIA ASSOCIATED WITH TYPE 2 DIABETES MELLITUS: ICD-10-CM

## 2018-12-21 DIAGNOSIS — Z12.11 COLON CANCER SCREENING: ICD-10-CM

## 2018-12-21 DIAGNOSIS — I15.2 HYPERTENSION ASSOCIATED WITH DIABETES: ICD-10-CM

## 2018-12-21 DIAGNOSIS — N64.4 PAIN OF LEFT BREAST: ICD-10-CM

## 2018-12-21 DIAGNOSIS — E11.8 TYPE 2 DIABETES MELLITUS WITH COMPLICATION, WITHOUT LONG-TERM CURRENT USE OF INSULIN: ICD-10-CM

## 2018-12-21 DIAGNOSIS — E78.5 HYPERLIPIDEMIA ASSOCIATED WITH TYPE 2 DIABETES MELLITUS: ICD-10-CM

## 2018-12-21 DIAGNOSIS — F33.1 MODERATE EPISODE OF RECURRENT MAJOR DEPRESSIVE DISORDER: ICD-10-CM

## 2018-12-21 LAB
ALBUMIN/CREAT UR: 3.7 UG/MG
CREAT UR-MCNC: 272 MG/DL
MICROALBUMIN UR DL<=1MG/L-MCNC: 10 UG/ML

## 2018-12-21 PROCEDURE — 3074F SYST BP LT 130 MM HG: CPT | Mod: CPTII,S$GLB,, | Performed by: INTERNAL MEDICINE

## 2018-12-21 PROCEDURE — 99215 OFFICE O/P EST HI 40 MIN: CPT | Mod: 25,S$GLB,, | Performed by: INTERNAL MEDICINE

## 2018-12-21 PROCEDURE — 3078F DIAST BP <80 MM HG: CPT | Mod: CPTII,S$GLB,, | Performed by: INTERNAL MEDICINE

## 2018-12-21 PROCEDURE — 96372 THER/PROPH/DIAG INJ SC/IM: CPT | Mod: S$GLB,,, | Performed by: INTERNAL MEDICINE

## 2018-12-21 PROCEDURE — 3044F HG A1C LEVEL LT 7.0%: CPT | Mod: CPTII,S$GLB,, | Performed by: INTERNAL MEDICINE

## 2018-12-21 PROCEDURE — G0008 ADMIN INFLUENZA VIRUS VAC: HCPCS | Mod: S$GLB,,, | Performed by: INTERNAL MEDICINE

## 2018-12-21 PROCEDURE — 90686 IIV4 VACC NO PRSV 0.5 ML IM: CPT | Mod: S$GLB,,, | Performed by: INTERNAL MEDICINE

## 2018-12-21 PROCEDURE — 82043 UR ALBUMIN QUANTITATIVE: CPT

## 2018-12-21 PROCEDURE — 3008F BODY MASS INDEX DOCD: CPT | Mod: CPTII,S$GLB,, | Performed by: INTERNAL MEDICINE

## 2018-12-21 RX ORDER — TOPIRAMATE 50 MG/1
50 TABLET, FILM COATED ORAL 2 TIMES DAILY
Qty: 180 TABLET | Refills: 3 | Status: SHIPPED | OUTPATIENT
Start: 2018-12-21 | End: 2020-05-07 | Stop reason: SDUPTHER

## 2018-12-21 RX ORDER — DULOXETIN HYDROCHLORIDE 30 MG/1
30 CAPSULE, DELAYED RELEASE ORAL DAILY
Qty: 90 CAPSULE | Refills: 3 | Status: SHIPPED | OUTPATIENT
Start: 2018-12-21 | End: 2023-06-27

## 2018-12-21 RX ORDER — DULOXETIN HYDROCHLORIDE 60 MG/1
CAPSULE, DELAYED RELEASE ORAL DAILY
Qty: 90 CAPSULE | Refills: 3 | Status: SHIPPED | OUTPATIENT
Start: 2018-12-21 | End: 2019-12-21

## 2018-12-21 RX ORDER — KETOROLAC TROMETHAMINE 30 MG/ML
60 INJECTION, SOLUTION INTRAMUSCULAR; INTRAVENOUS
Status: COMPLETED | OUTPATIENT
Start: 2018-12-21 | End: 2018-12-21

## 2018-12-21 RX ORDER — METFORMIN HYDROCHLORIDE 500 MG/1
500 TABLET, EXTENDED RELEASE ORAL 2 TIMES DAILY WITH MEALS
Qty: 180 TABLET | Refills: 3 | Status: SHIPPED | OUTPATIENT
Start: 2018-12-21 | End: 2020-05-07 | Stop reason: SDUPTHER

## 2018-12-21 RX ORDER — ATORVASTATIN CALCIUM 40 MG/1
40 TABLET, FILM COATED ORAL DAILY
Qty: 90 TABLET | Refills: 3 | Status: SHIPPED | OUTPATIENT
Start: 2018-12-21 | End: 2020-06-12 | Stop reason: SDUPTHER

## 2018-12-21 RX ADMIN — KETOROLAC TROMETHAMINE 60 MG: 30 INJECTION, SOLUTION INTRAMUSCULAR; INTRAVENOUS at 10:12

## 2018-12-21 NOTE — PATIENT INSTRUCTIONS
TODAY:  - labs  - flu vaccine  - pill packing X1 months  - scripts sent to [facility]  - continue tylenol for pain, also take ibuprofren 800mg TID for 3 days  - injection of toradol in clinic for pain

## 2018-12-21 NOTE — PROGRESS NOTES
"Primary Care Provider Appointment    Subjective:      Patient ID: Dai Roper is a 53 y.o. female with HTN, HLD, DM, obesity     Chief Complaint: Diabetes and Follow-up    Patient with recent abnormal mammogram. Underwent biopsy, with benign results, but she reports she needs follow-up in 6 months for this. Her RN note states " 6 months with repeat imaging per core biopsy protocol." She is taking tylenol 1g q6 hrs    She was seen in ED in 10/2018 for nausea. She reports having a virus at that time.    She reports that she is participating in pill packing, but has not picked up meds since 10/2/18. She reports compliance with meds independently at this time "from the bottles." Unclear whether she is taking meds.    She has low Vit D, but reported swelling following any supplementation. Can not supplement with Vit D2 at this time.    Pill packed as follows:  Adherence packed for 28 days in EZY dose weekly packs on 09/27.     Morning:  ASA 81mg  Duloxetine 30mg  Duloxetine 60mg to equal 90mg PO once daily  Metformin ER 500mg  Topiramate 50mg     Evening:  Metformin ER 500mg  Atorvastatin 40mg  Topiramate 50mg  Vitamin D3 5,000IU     Do not include gabapentin per pt request.     25''          Electronically signed by Gerardo RodgersD at 10/2/2018  9:56 AM       Past Surgical History:   Procedure Laterality Date    CARPAL TUNNEL RELEASE      HERNIA REPAIR      TUBAL LIGATION         Past Medical History:   Diagnosis Date    Anxiety     Asthma     Depression     Diabetes mellitus     DVT (deep venous thrombosis)     Hx of psychiatric care     Hypertension     Psychiatric problem     Sleep difficulties        Review of Systems   Constitutional: Positive for activity change and appetite change. Negative for fever.   Cardiovascular: Positive for leg swelling.   Gastrointestinal: Negative for constipation and diarrhea.   Musculoskeletal: Positive for arthralgias, joint swelling and myalgias. " "  Hematological: Does not bruise/bleed easily.   Psychiatric/Behavioral: Positive for decreased concentration, dysphoric mood and sleep disturbance. Negative for behavioral problems and confusion. The patient is nervous/anxious.        Objective:   BP 96/62 (BP Location: Right arm, Patient Position: Sitting, BP Method: Medium (Manual))   Pulse 69   Ht 5' 2" (1.575 m)   Wt 110.5 kg (243 lb 9.7 oz)   LMP 02/05/2013 (LMP Unknown)   SpO2 99%   BMI 44.56 kg/m²     Physical Exam   Constitutional: She is oriented to person, place, and time. She appears well-developed and well-nourished.   obese   HENT:   Head: Normocephalic and atraumatic.   Dental caries   Neck: Normal range of motion.   Cardiovascular: Normal rate.   Pulmonary/Chest: Effort normal.   Abdominal:   Obese abdomen   Musculoskeletal: She exhibits no edema.   Neurological: She is alert and oriented to person, place, and time.   Skin:   L breast with bandage over biopsy site  Bandage clean and dry  No area of skin erythema or inflammation surrounding it   Psychiatric: She has a normal mood and affect. Her behavior is normal. Thought content normal.   Vitals reviewed.      Lab Results   Component Value Date    WBC 7.70 08/27/2017    HGB 11.9 (L) 08/27/2017    HCT 36.9 (L) 08/27/2017     08/27/2017    CHOL 194 05/03/2018    TRIG 121 05/03/2018    HDL 73 05/03/2018    ALT 13 08/27/2017    AST 16 08/27/2017     05/03/2018    K 4.0 05/03/2018     05/03/2018    CREATININE 0.7 05/03/2018    BUN 8 05/03/2018    CO2 26 05/03/2018    TSH 0.687 06/02/2017    INR 0.9 08/28/2017    HGBA1C 5.7 (H) 05/03/2018         Assessment:   53 y.o. female with multiple co-morbid illnesses here to continue work-up of chronic issues notably HTN, HLD, DM, obesity    Plan:     Problem List Items Addressed This Visit        Psychiatric    Moderate episode of recurrent major depressive disorder     PHQ-9 score of 15 with previous history of depression requiring " medical treatment. No SI/HI, endorses caretaker burnout  · Increase duloxetine to 90mg  · Take in morning due to insomnia complaints  · Continue psychiatric care at [facility]  · Continue psychology treatment in [facility]         Relevant Medications    DULoxetine (CYMBALTA) 30 MG capsule    DULoxetine (CYMBALTA) 60 MG capsule    Other Relevant Orders    Lipid panel       Cardiac/Vascular    Hyperlipidemia associated with type 2 diabetes mellitus    Relevant Medications    atorvastatin (LIPITOR) 40 MG tablet    metFORMIN (GLUCOPHAGE-XR) 500 MG 24 hr tablet    Other Relevant Orders    Comprehensive metabolic panel    Hemoglobin A1c    Magnesium    Lipid panel    Microalbumin/creatinine urine ratio    Hypertension associated with diabetes     A1c 5.7 in 7/2017,variable BP, but elevated previously  · STOPPED HCTZ-lisinopril recurrent hypotension in clinic   · Electrolytes normal  · Not compliant with digital HTN program         Relevant Medications    metFORMIN (GLUCOPHAGE-XR) 500 MG 24 hr tablet    Other Relevant Orders    Comprehensive metabolic panel    Hemoglobin A1c       Renal/    History of abnormal mammogram     Abnormal mammo with calcifications in 5/2017, stable in 12/2017, abnormal in 12/2018, biopsy benign in 12/2018  · Imaging due in 6 months (6/2019)  · Injection of toradol in clinic  · Advised to continue tylenol, also take ibuprofren 800mg TID for 3 days         Relevant Medications    ketorolac injection 60 mg (Completed)    Pain of left breast     S/p L breast biopsy on Tues 12/18, uncontrolled pain today  · toradol injection in clinic  · continue tylenol for pain, also take ibuprofren 800mg TID for 3 days            Endocrine    Morbid obesity due to excess calories     BMI 44 in 4/2017 (was 49 in 1/2017), patient with DM, HTN. Losing weight through exercise and diet changes through health   · Decrease trans fats, decrease caloric intake  · No-show to health  referral  · Did not attend medical fitness  program   · Not using Silver [employer] membership at [employer]  · Referral to Dr Mario for weight loss suggestions  · Started on topiramate  · Diabetic ed class completed  · Restarted on metformin         Relevant Medications    metFORMIN (GLUCOPHAGE-XR) 500 MG 24 hr tablet    topiramate (TOPAMAX) 50 MG tablet    Other Relevant Orders    CBC auto differential    TSH    Type 2 diabetes mellitus with complication, without long-term current use of insulin     A1c 5.9 in 4/2017, compliant metformin 500mg daily, diarrhea resolved.  · Advised to cut back on high-glycemic foods  · Medical fitness consult, silver sneakers membership  · Patient restarted metformin 500 BID   · STRONGLY encouraged to exercise  · Diabetic eye screening photo 1/2018  · No DR  · Seen by optho 8/2018  · F/U PRN         Relevant Medications    metFORMIN (GLUCOPHAGE-XR) 500 MG 24 hr tablet    Other Relevant Orders    Comprehensive metabolic panel    Hemoglobin A1c    CBC auto differential    Microalbumin/creatinine urine ratio    Vitamin D deficiency     Vit D level of 10 in 1/2017  · Did not tolerate high-dose vit D 50,000U due to swelling  · Intolerant of 5000U daily due to swelling  · Advised to eat more tuna, salmon, eggs, cheese, vit D- fortified foods         Relevant Orders    Vitamin D       GI    Colon cancer screening     No history of colonoscopy, but refuses. Prefers iFOBT annually  FitKit was given to patient in 7/2017   Never turned in  FitKit was given to patient again in 12/2017   Advised to turn in  FitKit was given to patient on 12/21/2018 10:01 AM    Advised to turn in         Relevant Orders    Fecal Immunochemical Test (iFOBT)    CBC auto differential      Other Visit Diagnoses     Abnormal weight gain         Relevant Orders    TSH          Health Maintenance       Date Due Completion Date    Low Dose Statin 06/30/1984 ---TODAY    Colonoscopy 06/30/2013 ---iFOBT    TETANUS VACCINE 01/06/2016 1/6/2006    Foot Exam 06/22/2018  6/22/2017 (Done)- ALREADY DONE    Override on 6/22/2017: Done    Influenza Vaccine 08/01/2018 11/6/2017- TODAY    Urine Microalbumin 08/28/2018 8/28/2017- TODAY    Hemoglobin A1c 11/30/2018 5/31/2018- TODAY    Lipid Panel 05/03/2019 5/3/2018- TODAY    Pap Smear with HPV Cotest 05/11/2019 5/11/2016 (Done)    Override on 5/11/2016: Done (normal PAP at [facility])    Eye Exam 08/28/2019 8/28/2018    Mammogram 12/07/2020 12/7/2018          Follow-up in about 1 month (around 1/21/2019). One hour spent with this patient today, half of that in counseling.    Sumi Corrigan MD/MPH  Internal Medicine  [facility]  [phone]

## 2018-12-21 NOTE — ASSESSMENT & PLAN NOTE
A1c 5.9 in 4/2017, compliant metformin 500mg daily, diarrhea resolved.  · Advised to cut back on high-glycemic foods  · Medical fitness consult, silver sneakers membership  · Patient restarted metformin 500 BID   · STRONGLY encouraged to exercise  · Diabetic eye screening photo 1/2018  · No DR  · Seen by optho 8/2018  · F/U PRN

## 2018-12-21 NOTE — ASSESSMENT & PLAN NOTE
A1c 5.7 in 7/2017,variable BP, but elevated previously  · STOPPED HCTZ-lisinopril recurrent hypotension in clinic   · Electrolytes normal  · Not compliant with digital HTN program

## 2018-12-21 NOTE — ASSESSMENT & PLAN NOTE
BMI 44 in 4/2017 (was 49 in 1/2017), patient with DM, HTN. Losing weight through exercise and diet changes through health   · Decrease trans fats, decrease caloric intake  · No-show to health  referral  · Did not attend medical fitness program   · Not using [employer] membership at [place]  · Referral to Dr Mario for weight loss suggestions  · Started on topiramate  · Diabetic ed class completed  · Restarted on metformin

## 2018-12-21 NOTE — ASSESSMENT & PLAN NOTE
PHQ-9 score of 15 with previous history of depression requiring medical treatment. No SI/HI, endorses caretaker burnout  · Increase duloxetine to 90mg  · Take in morning due to insomnia complaints  · Continue psychiatric care at [facility]  · Continue psychology treatment in [facility]

## 2018-12-21 NOTE — PROGRESS NOTES
Adherence packed for 28 days in EZY dose Weekly packs     Morning:  ASA 81mg  Duloxetine 30mg  Duloxetine 60mg to equal 90mg PO once daily  Metformin ER 500mg  Topiramate 50mg     Evening:  Metformin ER 500mg  Atorvastatin 40mg  Topiramate 50mg  Vitamin D3 5,000IU

## 2018-12-21 NOTE — ASSESSMENT & PLAN NOTE
S/p L breast biopsy on Tues 12/18, uncontrolled pain today  · toradol injection in clinic  · continue tylenol for pain, also take ibuprofren 800mg TID for 3 days

## 2018-12-21 NOTE — ASSESSMENT & PLAN NOTE
Abnormal mammo with calcifications in 5/2017, stable in 12/2017, abnormal in 12/2018, biopsy benign in 12/2018  · Imaging due in 6 months (6/2019)  · Injection of toradol in clinic  · Advised to continue tylenol, also take ibuprofren 800mg TID for 3 days

## 2018-12-21 NOTE — ASSESSMENT & PLAN NOTE
No history of colonoscopy, but refuses. Prefers iFOBT annually  FitKit was given to patient in 7/2017   Never turned in  FitKit was given to patient again in 12/2017   Advised to turn in  FitKit was given to patient on 12/21/2018 10:01 AM    Advised to turn in

## 2018-12-22 DIAGNOSIS — E11.59 HYPERTENSION ASSOCIATED WITH DIABETES: ICD-10-CM

## 2018-12-22 DIAGNOSIS — E78.5 HYPERLIPIDEMIA ASSOCIATED WITH TYPE 2 DIABETES MELLITUS: ICD-10-CM

## 2018-12-22 DIAGNOSIS — E11.69 HYPERLIPIDEMIA ASSOCIATED WITH TYPE 2 DIABETES MELLITUS: ICD-10-CM

## 2018-12-22 DIAGNOSIS — I15.2 HYPERTENSION ASSOCIATED WITH DIABETES: ICD-10-CM

## 2018-12-22 DIAGNOSIS — E11.8 TYPE 2 DIABETES MELLITUS WITH COMPLICATION, WITHOUT LONG-TERM CURRENT USE OF INSULIN: ICD-10-CM

## 2018-12-23 RX ORDER — NAPROXEN SODIUM 220 MG/1
81 TABLET, FILM COATED ORAL DAILY
Qty: 90 TABLET | Refills: 3 | Status: SHIPPED | OUTPATIENT
Start: 2018-12-23 | End: 2020-05-07 | Stop reason: SDUPTHER

## 2019-01-01 ENCOUNTER — DOCUMENTATION ONLY (OUTPATIENT)
Dept: BARIATRICS | Facility: CLINIC | Age: 54
End: 2019-01-01

## 2019-01-02 ENCOUNTER — TELEPHONE (OUTPATIENT)
Dept: BARIATRICS | Facility: CLINIC | Age: 54
End: 2019-01-02

## 2019-01-02 NOTE — PROGRESS NOTES
Bariatric Surgery Online Course Form Submission  Someone has submitted a Bariatric Surgery Online Course Form Submission on this page.  Date: 01- 10:50:53  Patient's Name: Dai Roper  YOB: 1965 Email: [email or web address]  Phone: [phone]   Patient Address: [address] [place] [address]  Preferred Surgical Location: [facility]   I certify that I am 18 years of age or older: Yes   Confirmation Code: [id number]  Verification of Bariatric Seminar: yes  Insurance Information  Insurance or Self Pay? Insurance - Fill out fields below  Insurance Company Name: [employer],medicaid   Type of Coverage/Coverage Plan (i.e. PPO, HMO): Hmo/[id number]   Group Name: Medicaid   Subscriber Name: [employer]   Member Name (patient's name): Dai romero aishwaryaradhaelva   Member ID/Policy #:[id number]   Plan Effective Date: 01/01/2019  Card Issuance date: 11/05/2017

## 2019-01-02 NOTE — TELEPHONE ENCOUNTER
Patient called back. Scheduled consult appointment. Informed patient that her voicemail box is not set up. Patient states she knows and does not intend to set it up.

## 2019-01-02 NOTE — TELEPHONE ENCOUNTER
----- Message from Ivory Schafer sent at 1/2/2019  9:16 AM CST -----  Contact: pt   Patient Requesting Sooner Appointment.     Reason for sooner appt.: pt is calling to speak with the nurse pt completed the online seminar and is ready to schedule her consult appt   When is the first available appointment?N/A   Communication Preference: can you please call pt at [phone]  Additional Information: none    ELIZABETH

## 2019-01-04 ENCOUNTER — CLINICAL SUPPORT (OUTPATIENT)
Dept: BARIATRICS | Facility: CLINIC | Age: 54
End: 2019-01-04
Payer: MEDICARE

## 2019-01-04 ENCOUNTER — INITIAL CONSULT (OUTPATIENT)
Dept: BARIATRICS | Facility: CLINIC | Age: 54
End: 2019-01-04
Payer: MEDICARE

## 2019-01-04 ENCOUNTER — TELEPHONE (OUTPATIENT)
Dept: BARIATRICS | Facility: CLINIC | Age: 54
End: 2019-01-04

## 2019-01-04 VITALS
SYSTOLIC BLOOD PRESSURE: 115 MMHG | HEART RATE: 68 BPM | HEIGHT: 62 IN | DIASTOLIC BLOOD PRESSURE: 60 MMHG | BODY MASS INDEX: 44.38 KG/M2 | WEIGHT: 241.19 LBS

## 2019-01-04 DIAGNOSIS — E55.9 VITAMIN D DEFICIENCY: ICD-10-CM

## 2019-01-04 DIAGNOSIS — E11.8 TYPE 2 DIABETES MELLITUS WITH COMPLICATION, WITHOUT LONG-TERM CURRENT USE OF INSULIN: ICD-10-CM

## 2019-01-04 DIAGNOSIS — Z79.891 LONG TERM CURRENT USE OF OPIATE ANALGESIC: ICD-10-CM

## 2019-01-04 DIAGNOSIS — I10 HYPERTENSION, UNSPECIFIED TYPE: ICD-10-CM

## 2019-01-04 DIAGNOSIS — E11.8 TYPE 2 DIABETES MELLITUS WITH COMPLICATION, UNSPECIFIED WHETHER LONG TERM INSULIN USE: ICD-10-CM

## 2019-01-04 DIAGNOSIS — E66.01 MORBID OBESITY: Primary | ICD-10-CM

## 2019-01-04 DIAGNOSIS — F41.1 GAD (GENERALIZED ANXIETY DISORDER): ICD-10-CM

## 2019-01-04 DIAGNOSIS — I15.2 HYPERTENSION ASSOCIATED WITH DIABETES: ICD-10-CM

## 2019-01-04 DIAGNOSIS — Z79.899 OTHER LONG TERM (CURRENT) DRUG THERAPY: ICD-10-CM

## 2019-01-04 DIAGNOSIS — E11.69 HYPERLIPIDEMIA ASSOCIATED WITH TYPE 2 DIABETES MELLITUS: ICD-10-CM

## 2019-01-04 DIAGNOSIS — N18.9 CHRONIC KIDNEY DISEASE, UNSPECIFIED CKD STAGE: ICD-10-CM

## 2019-01-04 DIAGNOSIS — E11.59 HYPERTENSION ASSOCIATED WITH DIABETES: ICD-10-CM

## 2019-01-04 DIAGNOSIS — E78.5 HYPERLIPIDEMIA ASSOCIATED WITH TYPE 2 DIABETES MELLITUS: ICD-10-CM

## 2019-01-04 DIAGNOSIS — E78.5 HYPERLIPIDEMIA, UNSPECIFIED HYPERLIPIDEMIA TYPE: ICD-10-CM

## 2019-01-04 DIAGNOSIS — E66.01 MORBID OBESITY WITH BMI OF 40.0-44.9, ADULT: Primary | ICD-10-CM

## 2019-01-04 PROCEDURE — 3008F BODY MASS INDEX DOCD: CPT | Mod: CPTII,S$GLB,, | Performed by: SURGERY

## 2019-01-04 PROCEDURE — 99215 PR OFFICE/OUTPT VISIT, EST, LEVL V, 40-54 MIN: ICD-10-PCS | Mod: S$GLB,,, | Performed by: SURGERY

## 2019-01-04 PROCEDURE — 99999 PR PBB SHADOW E&M-EST. PATIENT-LVL II: CPT | Mod: PBBFAC,,, | Performed by: DIETITIAN, REGISTERED

## 2019-01-04 PROCEDURE — 99499 UNLISTED E&M SERVICE: CPT | Mod: HCNC,S$GLB,, | Performed by: SURGERY

## 2019-01-04 PROCEDURE — 99999 PR PBB SHADOW E&M-EST. PATIENT-LVL II: ICD-10-PCS | Mod: PBBFAC,,, | Performed by: DIETITIAN, REGISTERED

## 2019-01-04 PROCEDURE — 3078F DIAST BP <80 MM HG: CPT | Mod: CPTII,S$GLB,, | Performed by: SURGERY

## 2019-01-04 PROCEDURE — 99499 RISK ADDL DX/OHS AUDIT: ICD-10-PCS | Mod: HCNC,S$GLB,, | Performed by: SURGERY

## 2019-01-04 PROCEDURE — 3074F SYST BP LT 130 MM HG: CPT | Mod: CPTII,S$GLB,, | Performed by: SURGERY

## 2019-01-04 PROCEDURE — 99999 PR PBB SHADOW E&M-EST. PATIENT-LVL III: CPT | Mod: PBBFAC,,, | Performed by: SURGERY

## 2019-01-04 PROCEDURE — 3044F HG A1C LEVEL LT 7.0%: CPT | Mod: CPTII,S$GLB,, | Performed by: SURGERY

## 2019-01-04 PROCEDURE — 3044F PR MOST RECENT HEMOGLOBIN A1C LEVEL <7.0%: ICD-10-PCS | Mod: CPTII,S$GLB,, | Performed by: SURGERY

## 2019-01-04 PROCEDURE — 3074F PR MOST RECENT SYSTOLIC BLOOD PRESSURE < 130 MM HG: ICD-10-PCS | Mod: CPTII,S$GLB,, | Performed by: SURGERY

## 2019-01-04 PROCEDURE — 99999 PR PBB SHADOW E&M-EST. PATIENT-LVL III: ICD-10-PCS | Mod: PBBFAC,,, | Performed by: SURGERY

## 2019-01-04 PROCEDURE — 3078F PR MOST RECENT DIASTOLIC BLOOD PRESSURE < 80 MM HG: ICD-10-PCS | Mod: CPTII,S$GLB,, | Performed by: SURGERY

## 2019-01-04 PROCEDURE — 99215 OFFICE O/P EST HI 40 MIN: CPT | Mod: S$GLB,,, | Performed by: SURGERY

## 2019-01-04 PROCEDURE — 99499 UNLISTED E&M SERVICE: CPT | Mod: S$GLB,,, | Performed by: DIETITIAN, REGISTERED

## 2019-01-04 PROCEDURE — 3008F PR BODY MASS INDEX (BMI) DOCUMENTED: ICD-10-PCS | Mod: CPTII,S$GLB,, | Performed by: SURGERY

## 2019-01-04 PROCEDURE — 99499 NO LOS: ICD-10-PCS | Mod: S$GLB,,, | Performed by: DIETITIAN, REGISTERED

## 2019-01-04 NOTE — PATIENT INSTRUCTIONS
Bariatric Diet Grocery List      High in Protein:   ? Canned tuna or chicken (packed in water)  ? Lean ground turkey breast or ground round  ? Turkey or chicken (no skin)  ? Lean pork or beef   ? Scrambled, poached, or boiled eggs  ? Baked or broiled fish and seafood (not fried!)  ? Beans, edamame and lentils  ? Low fat deli meats: turkey, chicken, ham, roast beef  ? 1% or Skim Milk, Lactaid, or Soymilk  ? Low-fat cheese, cottage cheese, mozzarella string cheese, ricotta  ? Light yogurt, FF/SF frozen yogurt, custard, SF pudding  ? Protein drinks and protein bars with 0-4 grams sugar     Fruits, Vegetables and Snacks   - Green beans, broccoli, cauliflower, spinach, asparagus, carrots, lima beans, yellow squash, zucchini, etc.  - Apple, pineapple, peach, grapes, banana, watermelon, oranges, etc.  - Fruit canned in its own juice or in water (not in syrup)  - Raw veggies  - Lettuce: dark greens like spinach and Gustavo  - Unsalted Nuts  - Wu Links beef jerky     Fluids:   Skim/1% milk, Lactaid, Soymilk  Sugar-free beverages  (decaf and non-carbonated)  Decaf coffee & decaf tea   Water       Eating healthy on the go:     Fast Food Restaurant/Item Calories Protein    Apervita Premium Grilled Chicken Classic [place], no bun < 350 28   Apervita Premium Salads with Grilled Chicken, no dressing  190-290 27-30   Lindas Ultimate Chicken [place], no bun < 390 34   Lindas Half-Size Salads 290-440 19-23   Lindas Large [place]  270 19   [employer] Double Hamburger, no bun < 330 19   [employer] TENDERGRILL Chicken [place], no bun or correa < 360 36   [employer] Veggie Burger, no bun or correa < 320 22   [employer] Garden Fresh Salad Chicken Caesar with TENDERGRILL and dressing   (*Use ½ dressing packet to reduce calories)  490 41   Chick-abhi-A Chargrilled Chicken [place], no bun < 310 29   Chick-abhi-A Salads with Grilled Chicken, no dressing  (*Not the [place] Salad)  180-330 25-29   Sonic Jr. [place], no bun < 330 15   Sonic  Classic [place] Grilled, no bun  < 450 32   Popeyes Naked Tenders (3) with Regular Green Beans, no sauce 210 28   [facility]s Club [place], no correa 277 37   Chipotle Chicken, Steak, or Carnitas Salad with Black or Nguyen Beans, Tomato Salsa, and Fajita Veggies on Bed of Lettuce  335-360 33-42     Sit Down Restaurant/Item Calories Protein    Applebees Under 550 Calories Entrees, no potatoes  410-490 43-56   Applebees Weight Watchers Entrees, no potatoes 300-490 22-53   Applebees 7 oz. House Sirloin with seasonal vegetables 285-310 36-40   Applebees Bowl of [place] 400 29   IHOP Simple&Fit Spinach, Mushroom, and Tomato Omelette  330 29   [employer] Simple&Fit Grilled Balsamic-Glazed Chicken  440 39   [place] Garden Grilled Chicken [place]ini with Sweet Red Wine Sauce 472 37   Olive Garden Sicilian Meatballs 360 23   [place] Garden Herb-Grilled [place] 480 56   [place]s Fit Fare Omelette  390 34     1200- 1500 calorie Sample meal plan   80-120g protein per day.   Protein drinks and bars: 0-4 grams sugar   Drink lots of water throughout the day and exercise!   MENU # 1   Breakfast: 2 eggs, 1 turkey sausage amaya, 1 apple   Snack: P3 protein pack  Lunch: 2 roll-ups (sliced turkey, low-fat sliced cheese, mustard), 12 baby carrots dipped in 1 Tbsp natural peanut butter   Mid-Day Snack: ¼ cup unsalted almonds, ½ cup fruit   Dinner: 1 chicken thigh simmered in tomato sauce + 2 Tbsp mozzarella cheese, ½ cup black beans, 1/2 cup steamed carrots   Evening Snack: 1/2 cup grapes with 4 cubes low-fat cheddar cheese   ___________________________________________________   MENU # 2   Breakfast: 200 calorie protein drink   Mid-morning snack : ¼ cup unsalted nuts, medium banana   Lunch: 3oz tuna or chicken salad made with 2 tbsp light correa, over salad with tomatoes and cucumbers.   Snack: low-fat cheese stick   Dinner: 3oz hamburger amaya, slice of low-fat cheese, 1 cup yellow squash and zucchini sauteed in nonstick  cookspray  Snack: 6oz light yogurt   _______________________________________________________   Menu #3   Breakfast: 6oz plain Greek yogurt + fruit (½ banana OR ½ cup fruit - pineapple, blueberries, strawberries, peach), may add Splenda to brandon.   Lunch: Grilled chicken breast w/ slice low-fat pepper dino cheese, 1/2 cup grilled/baked asparagus and small salad with Salad Spritzer.   Mid-Day snack: 200 calorie protein drink   Dinner: 4oz Grilled fish, ½ cup white beans, ½ cup cooked spinach   Evening Snack: fudgsicle no-sugar-added   Menu # 4   Breakfast: 1 scoop vanilla protein powder + 4oz skim milk + 4oz coffee   Snack: Pure protein bar   Lunch: 2 Lettuce tacos: 3oz seasoned ground turkey wrapped in a Gustavo lettuce leaves with 1 Tbsp shredded cheese and dollop low-fat sour cream   Snack: ½ cup cottage cheese, ½ cup pineapple chunks   Dinner: Shrimp omelet: 2 eggs, ½ cup shrimp, green onions, and shredded cheese   ______________________________________________________   Menu #5   Breakfast: 1 cup low-fat cottage cheese, ½ cup peaches (no sugar added)   Snack: 1 apple, 4 cubes cheese   Lunch: 3oz baked pork chop, 1 cup okra and tomato stew   Snack: 1/2 cup black beans + salsa + dollop light sour cream   Dinner: Caprese chicken salad: 3 oz chicken breast, 1oz fresh mozzarella, sliced tomato, 1 Tbsp olive oil, basil   Snack: sugar-free popsicle   _______________________________________________________  Menu #6   Breakfast: ½ cup part-skim ricotta cheese, 2 Tbsp sugar-free strawberry preserves, sprinkle of slivered almonds   Snack: 1 orange + string cheese  Lunch: 3 oz canned chicken, 1oz shredded cheddar cheese, ½ cup black beans, 2 Tbsp salsa   Snack: 200 calorie Protein drink   Dinner: 4 oz grilled salmon steak, over mixed green salad with 2 tbsp light dressing   _______________________________________________________  Menu #7  Breakfast: crust-less quiche (bake 1 egg mixed w/ low fat cheese, broccoli and low  sodium ham in a muffin cup until cooked inside)  Snack: 1 light yogurt  Lunch: protein shake (1 scoop powder + 8 oz skim milk, blended w/ ice)  Snack: small apple w/ 2 tbsp PB2 (powdered peanut butter)  Dinner: 2 Turkey meatballs w/ low sugar marinara sauce, 1/2 cup spiralized zucchini (sauteed on stove top w/ nonstick cookspray)

## 2019-01-04 NOTE — LETTER
January 4, 2019      Moni Mario MD  [address]  [place] [address]           [facility] - Bariatric Surgery  [address]  [place] [address]  Phone: [phone]  Fax: [phone]          Patient: Dai Roper   MR Number: [id number]   YOB: 1965   Date of Visit: 1/4/2019       Dear Dr. Moni Mario:    Thank you for referring Dai Roper to me for evaluation. Attached you will find relevant portions of my assessment and plan of care.    If you have questions, please do not hesitate to call me. I look forward to following Dai Roper along with you.    Sincerely,    Lesli Roldan MD    Enclosure  CC:  No Recipients    If you would like to receive this communication electronically, please contact [email or web address] or [phone] to request more information on [facility] access.    For providers and/or their staff who would like to refer a patient to [facility], please contact us through our one-stop-shop provider referral line, [facility] [place], at [phone].    If you feel you have received this communication in error or would no longer like to receive these types of communications, please e-mail [email or web address]

## 2019-01-04 NOTE — PROGRESS NOTES
BARIATRIC NEW PATIENT EVALUATION    CHIEF COMPLAINT:   Morbid obesity, Body mass index is 44.11 kg/m². and inability to lose weight.    HPI:  Dai Roper is a 53 y.o. female presenting for morbid obesity, Body mass index is 44.11 kg/m². and inability to lose weight. The patient has tried diabetic dietary education classes and low-carb diets, as well as walking up to 5 miles twice a day without success. Her maximum weight was 252 lbs about 1 year ago. She had met with Dr. Moni Mario and is now down to 241 lbs today. Her david adult weight was 160 lbs about 10 years ago. She states she was an average-sized child, however underwent 10 years of steroid therapy for asthma flares, ending about 1 year ago, and during that time gained most of her weight. She has decided to pursue surgery due to a plateau in her weight loss, her multiple co-morbidities, and she has a cousin that recently had bariatric surgery and has done well.    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 Lives with 14 year-old granddaughter. Otherwise doesn't have much of a community or social support system.    EKG 3/28/17: Normal sinus rhythm  Echo 1/9/17:     1 - Normal left ventricular systolic function (EF 60-65%).     2 - Normal left ventricular diastolic function.     3 - Normal right ventricular systolic function .     4 - The estimated PA systolic pressure is greater than 28 mmHg.     CXR 4/28/18: No acute radiographic findings in the chest.    Mammogram 12/4/18: Left breast 7 mm calcifications at the upper outer posterior position. Assessment: 4 - Suspicious finding. Biopsy is recommended.   BI-RADS Category:   Left: 4 - Suspicious  Overall: 4 - Suspicious  Recommendation:  Biopsy is recommended.  I discussed these  findings and recommendations with the patient in detail at the time of exam.    Left breast stereotactic core biopsy 12/18/18:   FINAL PATHOLOGIC DIAGNOSIS  1. LEFT BREAST, BIOPSY WITH CALCIFICATIONS:  Benign sclerosed fibrovascular tissue with calcifications  Negative for neoplasia or malignancy  Benign skin and subcutaneous tissue  No breast glandular tissue present  2. LEFT BREAST, BIOPSY WITHOUT CALCIFICATIONS:  Benign fibrovascular tissue  Negative for neoplasia or malignancy  Benign skin and subcutaneous tissue  No breast glandular tissue present    Colonoscopy: None. Performed FOBT 12/21/18. Results pending.    PAST MEDICAL HISTORY:  Past Medical History:   Diagnosis Date    Anxiety     Asthma     Depression     Diabetes mellitus     DVT (deep venous thrombosis)     Hx of psychiatric care     Hypertension     Psychiatric problem     Sleep difficulties      PAST SURGICAL HISTORY:  Past Surgical History:   Procedure Laterality Date    CARPAL TUNNEL RELEASE      HERNIA REPAIR      TUBAL LIGATION       FAMILY HISTORY:  Family History   Problem Relation Age of Onset    Diabetes Mother     Heart disease Mother     Hypertension Mother     Lupus Mother     Depression Mother     Anxiety disorder Mother     Heart disease Father     Hypertension Father     Hypertension Sister     Lupus Sister     No Known Problems Brother     Anxiety disorder Daughter     Depression Daughter     Depression Son     Anxiety disorder Son     Hypertension Sister     Hypertension Sister     Ovarian cancer Maternal Aunt      SOCIAL HISTORY:  Social History     Social History Narrative    Not on file     MEDICATIONS:  Medications have been reviewed.    ALLERGIES:  Allergies have been reviewed.    Review of Systems   Constitutional: Negative for chills, fever and malaise/fatigue.   HENT: Negative for congestion, hearing loss and sore throat.    Eyes: Negative for blurred vision and double vision.   Respiratory:  "Negative for cough, shortness of breath and wheezing.    Cardiovascular: Positive for leg swelling. Negative for chest pain.   Gastrointestinal: Positive for nausea. Negative for abdominal pain, heartburn and vomiting.   Musculoskeletal: Negative for falls and myalgias.   Skin: Negative for rash.   Neurological: Negative for dizziness and headaches.   Psychiatric/Behavioral: Negative for substance abuse. The patient does not have insomnia.        Vitals:    01/04/19 0927   BP: 115/60   Pulse: 68   Weight: 109.4 kg (241 lb 2.9 oz)   Height: 5' 2" (1.575 m)   PainSc: 0-No pain       Physical Exam   Constitutional: She is oriented to person, place, and time and well-developed, well-nourished, and in no distress. No distress.   HENT:   Head: Normocephalic and atraumatic.   Eyes: Conjunctivae and EOM are normal. Pupils are equal, round, and reactive to light. Right eye exhibits no discharge. No scleral icterus.   Neck: Normal range of motion. Neck supple. No tracheal deviation present. No thyromegaly present.   Cardiovascular: Normal rate, regular rhythm and normal heart sounds. Exam reveals no friction rub.   No murmur heard.  Pulmonary/Chest: Effort normal and breath sounds normal. No respiratory distress. She has no wheezes.   Abdominal: Bowel sounds are normal. She exhibits no distension. There is no tenderness.   Musculoskeletal: Normal range of motion. She exhibits no edema or deformity.   Lymphadenopathy:     She has no cervical adenopathy.   Neurological: She is alert and oriented to person, place, and time. Gait normal. GCS score is 15.   Skin: Skin is warm and dry. No rash noted. She is not diaphoretic. No erythema.   Psychiatric: Mood, affect and judgment normal.     DIAGNOSIS:  1. Morbid obesity, Body mass index is 44.11 kg/m². and inability to lose weight.  2. Co-morbidities: depression, diabetes mellitus, dyslipidemia, hypertension and athsma, and history of DVT    PLAN:  The patient is a good candidate for " Bariatric Surgery. The patient is interested in sleeve gastrectomy. The surgery and post-op care was discussed in detail with the patient. All questions were answered.    The patient understands that bariatric surgery is a tool to aid in weight loss and that they need to be committed to the diet and exercise post-operatively for successful weight loss. Discussed with patient that bariatric surgery is not the easy way out and that it will take plenty of dedication on the patient's part to be successful. Also discussed the possibility of weight regain if the patient strays from the diet guidelines or exercise requirements. Patient verbalized understanding and wishes to proceed with the work-up.    Estimated Goal weight is 175 lbs, approximately 50% of their excess weight.      ORDERS:  1. EKG  2. Psychological Consult, Bariatric Dietician Consult and Cardiac Clearance  3. Bariatric Labs: BMP, CBC, Folate Serum, H. pylori, HgA1C, Hepatic Panel/LFT, Iron & TIBC, Lipid Profile, Magnesium, Phosphate, T3, T4, TSH, Free T4, Vitamin B12, and Vitamin B1.    Referring Physician: Sumi Corrigan MD  RTC: As scheduled.    Lesli Roldan  1/4/2019

## 2019-01-04 NOTE — PROGRESS NOTES
"NUTRITIONAL CONSULT    Referring Physician:   Not sure which surgeon she would like to perform surgery  Reason for MNT Referral: Initial assessment for sleeve gastrectomy work-up    PAST MEDICAL HISTORY:   53 y.o. female  There is no height or weight on file to calculate BMI..  Weight history includes current weight is highest wt, lowest weight was 180 lbs 7 ears ago  Dieting attempts include walking    Past Medical History:   Diagnosis Date    Anxiety     Asthma     Depression     Diabetes mellitus     DVT (deep venous thrombosis)     Hx of psychiatric care     Hypertension     Psychiatric problem     Sleep difficulties        CLINICAL DATA:  53 y.o.-year-old Black or  female.  Height: 5'2"  Weight:241lbs  IBW: 132 lbs  BMI: 44.11  The patient's goal weight (50% EBW): 186.5 lbs  Personal goal weight: Not sure    Goal for Bariatric Surgery: to improve health and to prevent future medical conditions    NUTRITIONAL NEEDS:  1200 -1500 Calories   72 - 90 Grams Protein (1.2-1.5 gm/kg IBW)    NUTRITION & HEALTH HISTORY:  Greatest challenge: skipping breakfast    Current diet recall:     Breakfast: Skips usually once a week coffee 2 tsp sugar and milk  Lunch: [employer] Innovationsagna 1/2 square with  1/2 can of minute maid lemonade  Dinner: Taco meat and lettuce and water  Snacks: none    Current Diet:  Meal pattern: 2 meals with no snacks  Protein supplements: Smoothie Teja Banana Strawberry  Snackin / day  Vegetables: Likes a variety. Eats once per week.  Fruits: Likes a variety. Eats once per week.  Beverages: water, coffee with sugar and no milk  Dining out: Weekly.once every 2-3 weeks Mostly fast food.  Cooking at home: Weekly. 1-2 times per week Mostly baked meat and steamed rice at times.    Exercise:  Past exercise: Adequate    Current exercise: Adequate  Restrictions to exercise: Pain at times and asthma    Vitamins / Minerals / Herbs:   B-12 5000 ug    Cannot take Vitamin D due to reaction from taking " Vitamin D supplement  Labs:   none available at this time, need to be scheduled    Food Allergies:   None reported    Social:  Work 2pm to 10pm but will change to morning  Lives with granddaughter- 14 years .  Grocery shopping and food prep  self  Patient believes the household will be supportive after surgery.  Alcohol: None.  Smoking: None.    ASSESSMENT:  · Patient reports attempts at weight loss, only to regain lost weight.  · Patient demonstrated knowledge of healthy eating behaviors and exercise patterns; admits to not eating healthy and not exercising at this point.  · Patient states willingness to change lifestyle and make behavior modifications as evidenced by dietary changes and more food preparation at [place].    Insurance requires NO medically supervised diet prior to consideration for bariatric surgery.    Barriers to Education: none    Stage of change: determination    NUTRITION DIAGNOSIS:    Obesity related to Inadequate calorie intake and Inadequate protein intake as evidence by BMI.    BARIATRIC DIET DISCUSSION/PLAN:  Discussed diet after surgery and related to patient's food record.  Reviewed nutrition guidelines for before and after surgery.  Answered all questions.  Work on Bariatric Nutrition Checklist.  Work on expanding variety of vegetables.  Work on gradually cutting back on starchy CHO in the diet.  Begin trying various protein supplements to determine preference.  1500-calorie diet.  5-6 meals per day.  Reduce frequency of dining out.  More grocery shopping and meal preparation at home.  Increase exercise.  Return to clinic.    RECOMMENDATIONS:  Patient is a potential candidate for bariatric surgery.    Needs at least 1additional visit(s) with RD.    Patient verbalized understanding.    Expect good  compliance after surgery at this time.    Communicated nutrition plan with bariatric team.    SESSION TIME:  60 minutes

## 2019-01-05 ENCOUNTER — HOSPITAL ENCOUNTER (EMERGENCY)
Facility: HOSPITAL | Age: 54
Discharge: HOME | End: 2019-01-05
Attending: SURGERY
Payer: MEDICARE

## 2019-01-05 VITALS
TEMPERATURE: 99 F | DIASTOLIC BLOOD PRESSURE: 86 MMHG | WEIGHT: 241 LBS | BODY MASS INDEX: 44.08 KG/M2 | HEART RATE: 85 BPM | SYSTOLIC BLOOD PRESSURE: 133 MMHG | RESPIRATION RATE: 20 BRPM | OXYGEN SATURATION: 100 %

## 2019-01-05 DIAGNOSIS — B34.9 VIRAL SYNDROME: Primary | ICD-10-CM

## 2019-01-05 LAB
ALBUMIN SERPL BCP-MCNC: 3.9 G/DL
ALP SERPL-CCNC: 74 U/L
ALT SERPL W/O P-5'-P-CCNC: 13 U/L
ANION GAP SERPL CALC-SCNC: 9 MMOL/L
AST SERPL-CCNC: 14 U/L
BASOPHILS # BLD AUTO: 0.02 K/UL
BASOPHILS NFR BLD: 0.4 %
BILIRUB SERPL-MCNC: 0.4 MG/DL
BILIRUB UR QL STRIP: NEGATIVE
BUN SERPL-MCNC: 13 MG/DL
CALCIUM SERPL-MCNC: 9.6 MG/DL
CHLORIDE SERPL-SCNC: 106 MMOL/L
CLARITY UR: CLEAR
CO2 SERPL-SCNC: 25 MMOL/L
COLOR UR: YELLOW
CREAT SERPL-MCNC: 0.9 MG/DL
DIFFERENTIAL METHOD: ABNORMAL
EOSINOPHIL # BLD AUTO: 0.1 K/UL
EOSINOPHIL NFR BLD: 1.3 %
ERYTHROCYTE [DISTWIDTH] IN BLOOD BY AUTOMATED COUNT: 13.4 %
EST. GFR  (AFRICAN AMERICAN): >60 ML/MIN/1.73 M^2
EST. GFR  (NON AFRICAN AMERICAN): >60 ML/MIN/1.73 M^2
GLUCOSE SERPL-MCNC: 98 MG/DL
GLUCOSE UR QL STRIP: NEGATIVE
HCT VFR BLD AUTO: 38.8 %
HGB BLD-MCNC: 12.8 G/DL
HGB UR QL STRIP: ABNORMAL
INFLUENZA A, MOLECULAR: NEGATIVE
INFLUENZA B, MOLECULAR: NEGATIVE
KETONES UR QL STRIP: NEGATIVE
LEUKOCYTE ESTERASE UR QL STRIP: NEGATIVE
LIPASE SERPL-CCNC: 17 U/L
LYMPHOCYTES # BLD AUTO: 2.4 K/UL
LYMPHOCYTES NFR BLD: 50.2 %
MCH RBC QN AUTO: 29.3 PG
MCHC RBC AUTO-ENTMCNC: 33 G/DL
MCV RBC AUTO: 89 FL
MONOCYTES # BLD AUTO: 0.2 K/UL
MONOCYTES NFR BLD: 4.2 %
NEUTROPHILS # BLD AUTO: 2.1 K/UL
NEUTROPHILS NFR BLD: 43.9 %
NITRITE UR QL STRIP: NEGATIVE
PH UR STRIP: 6 [PH] (ref 5–8)
PLATELET # BLD AUTO: 240 K/UL
PMV BLD AUTO: 12 FL
POTASSIUM SERPL-SCNC: 3.9 MMOL/L
PROT SERPL-MCNC: 7.5 G/DL
PROT UR QL STRIP: NEGATIVE
RBC # BLD AUTO: 4.37 M/UL
SODIUM SERPL-SCNC: 140 MMOL/L
SP GR UR STRIP: 1.02 (ref 1–1.03)
SPECIMEN SOURCE: NORMAL
URN SPEC COLLECT METH UR: ABNORMAL
UROBILINOGEN UR STRIP-ACNC: NEGATIVE EU/DL
WBC # BLD AUTO: 4.78 K/UL

## 2019-01-05 PROCEDURE — 63600175 PHARM REV CODE 636 W HCPCS: Performed by: NURSE PRACTITIONER

## 2019-01-05 PROCEDURE — 36415 COLL VENOUS BLD VENIPUNCTURE: CPT

## 2019-01-05 PROCEDURE — 99284 EMERGENCY DEPT VISIT MOD MDM: CPT | Mod: 25

## 2019-01-05 PROCEDURE — 87502 INFLUENZA DNA AMP PROBE: CPT

## 2019-01-05 PROCEDURE — 83690 ASSAY OF LIPASE: CPT

## 2019-01-05 PROCEDURE — 80053 COMPREHEN METABOLIC PANEL: CPT

## 2019-01-05 PROCEDURE — 81003 URINALYSIS AUTO W/O SCOPE: CPT

## 2019-01-05 PROCEDURE — 96372 THER/PROPH/DIAG INJ SC/IM: CPT

## 2019-01-05 PROCEDURE — 85025 COMPLETE CBC W/AUTO DIFF WBC: CPT

## 2019-01-05 RX ORDER — DICYCLOMINE HYDROCHLORIDE 20 MG/1
20 TABLET ORAL 4 TIMES DAILY PRN
Qty: 20 TABLET | Refills: 0 | Status: SHIPPED | OUTPATIENT
Start: 2019-01-05 | End: 2019-01-07

## 2019-01-05 RX ORDER — DICYCLOMINE HYDROCHLORIDE 10 MG/ML
20 INJECTION INTRAMUSCULAR
Status: COMPLETED | OUTPATIENT
Start: 2019-01-05 | End: 2019-01-05

## 2019-01-05 RX ORDER — CETIRIZINE HYDROCHLORIDE 10 MG/1
10 TABLET ORAL DAILY
Qty: 14 TABLET | Refills: 0 | Status: SHIPPED | OUTPATIENT
Start: 2019-01-05 | End: 2019-01-07

## 2019-01-05 RX ADMIN — DICYCLOMINE HYDROCHLORIDE 20 MG: 20 INJECTION, SOLUTION INTRAMUSCULAR at 12:01

## 2019-01-05 NOTE — ED PROVIDER NOTES
Encounter Date: 1/5/2019       History     Chief Complaint   Patient presents with    Diarrhea     Patient presents with new onset nasal congestion, postnasal drip, body aches, chills, abdominal cramping, diarrhea.  She reports that her symptoms started yesterday.  She is unsure what makes abdominal pain better or worse.  Denies nausea or vomiting.  Reports decrease in appetite.  She reports a total of 3 episodes of diarrhea.  She denies blood in her stool.  She denies sore throat or cough.  Denies urinary symptoms.      The history is provided by the patient.     Review of patient's allergies indicates:   Allergen Reactions    Vitamin d analogue Edema     NOT allergy, but reaction to high dose vitamin D     Past Medical History:   Diagnosis Date    Anxiety     Asthma     Depression     Diabetes mellitus     DVT (deep venous thrombosis)     Hx of psychiatric care     Hypertension     Psychiatric problem     Sleep difficulties      Past Surgical History:   Procedure Laterality Date    CARPAL TUNNEL RELEASE      HERNIA REPAIR      TUBAL LIGATION       Family History   Problem Relation Age of Onset    Diabetes Mother     Heart disease Mother     Hypertension Mother     Lupus Mother     Depression Mother     Anxiety disorder Mother     Heart disease Father     Hypertension Father     Hypertension Sister     Lupus Sister     No Known Problems Brother     Anxiety disorder Daughter     Depression Daughter     Depression Son     Anxiety disorder Son     Hypertension Sister     Hypertension Sister     Ovarian cancer Maternal Aunt      Social History     Tobacco Use    Smoking status: Never Smoker    Smokeless tobacco: Never Used   Substance Use Topics    Alcohol use: No    Drug use: No     Review of Systems   Constitutional: Positive for chills. Negative for fever.   HENT: Positive for congestion, postnasal drip and rhinorrhea. Negative for ear pain, sore throat and trouble swallowing.     Eyes: Negative for pain, discharge, redness and visual disturbance.   Respiratory: Negative for cough, shortness of breath and wheezing.    Cardiovascular: Negative for chest pain and leg swelling.   Gastrointestinal: Positive for abdominal pain (Described as cramping) and diarrhea. Negative for constipation, nausea and vomiting.   Genitourinary: Negative for difficulty urinating, dysuria, flank pain, frequency and urgency.   Musculoskeletal: Positive for myalgias. Negative for arthralgias, back pain and neck pain.   Skin: Negative for rash and wound.   Neurological: Negative for seizures, weakness and headaches.   Psychiatric/Behavioral: Negative.        Physical Exam     Initial Vitals [01/05/19 1118]   BP Pulse Resp Temp SpO2   133/86 85 20 98.6 °F (37 °C) 100 %      MAP       --         Physical Exam    Nursing note and vitals reviewed.  Constitutional: She is Obese . No distress.   HENT:   Head: Normocephalic and atraumatic.   Right Ear: External ear normal.   Left Ear: External ear normal.   Nose: Nose normal.   Mouth/Throat: Oropharynx is clear and moist.   Eyes: Conjunctivae, EOM and lids are normal. Pupils are equal, round, and reactive to light.   Neck: Neck supple.   Cardiovascular: Normal rate, regular rhythm, S1 normal, S2 normal, normal heart sounds and intact distal pulses.   Pulmonary/Chest: Effort normal and breath sounds normal. No respiratory distress.   Abdominal: Soft. Bowel sounds are normal. There is no tenderness.   Musculoskeletal: Normal range of motion.   Neurological: She is alert and oriented to person, place, and time. She has normal strength. GCS eye subscore is 4. GCS verbal subscore is 5. GCS motor subscore is 6.   Skin: Skin is warm and dry. Capillary refill takes less than 2 seconds. No rash noted.   Psychiatric: She has a normal mood and affect. Her speech is normal and behavior is normal.         ED Course   Procedures  Labs Reviewed   CBC W/ AUTO DIFFERENTIAL - Abnormal;  Notable for the following components:       Result Value    Mono # 0.2 (*)     Lymph% 50.2 (*)     All other components within normal limits   URINALYSIS, REFLEX TO URINE CULTURE - Abnormal; Notable for the following components:    Occult Blood UA Trace (*)     All other components within normal limits    Narrative:     Preferred Collection Type->Urine, Clean Catch   INFLUENZA A & B BY MOLECULAR   COMPREHENSIVE METABOLIC PANEL   LIPASE          Imaging Results    None                    Medications   dicyclomine injection 20 mg (20 mg Intramuscular Given 1/5/19 1227)                   Clinical Impression:   The encounter diagnosis was Viral syndrome.      Disposition:   Disposition: Discharged  Condition: Stable    The patient acknowledges that close follow up with medical provider is required. Instructed to follow up with PCP within 2 days. Patient was given specific return precautions. The patient agrees to comply with all instruction and directions given in the ER.                         Jayde Cazares NP  01/05/19 4877

## 2019-01-05 NOTE — ED TRIAGE NOTES
53 y.o. female presents to ER   Chief Complaint   Patient presents with    Diarrhea   Pt reports lower abdominal pain onset this morning, reports 3 episodes of diarrhea.  No acute distress noted.

## 2019-01-07 ENCOUNTER — TELEPHONE (OUTPATIENT)
Dept: INTERNAL MEDICINE | Facility: CLINIC | Age: 54
End: 2019-01-07

## 2019-01-07 ENCOUNTER — OFFICE VISIT (OUTPATIENT)
Dept: PRIMARY CARE CLINIC | Facility: CLINIC | Age: 54
End: 2019-01-07
Payer: MEDICARE

## 2019-01-07 ENCOUNTER — HOSPITAL ENCOUNTER (EMERGENCY)
Facility: HOSPITAL | Age: 54
Discharge: HOME | End: 2019-01-07
Attending: SURGERY
Payer: MEDICARE

## 2019-01-07 VITALS
HEART RATE: 84 BPM | TEMPERATURE: 97 F | BODY MASS INDEX: 44.08 KG/M2 | OXYGEN SATURATION: 99 % | DIASTOLIC BLOOD PRESSURE: 79 MMHG | SYSTOLIC BLOOD PRESSURE: 124 MMHG | RESPIRATION RATE: 20 BRPM | WEIGHT: 241 LBS

## 2019-01-07 VITALS
BODY MASS INDEX: 44.38 KG/M2 | OXYGEN SATURATION: 99 % | HEIGHT: 62 IN | WEIGHT: 241.19 LBS | HEART RATE: 71 BPM | SYSTOLIC BLOOD PRESSURE: 126 MMHG | DIASTOLIC BLOOD PRESSURE: 82 MMHG

## 2019-01-07 DIAGNOSIS — J44.89 CHRONIC OBSTRUCTIVE ASTHMA: ICD-10-CM

## 2019-01-07 DIAGNOSIS — F33.1 MODERATE EPISODE OF RECURRENT MAJOR DEPRESSIVE DISORDER: ICD-10-CM

## 2019-01-07 DIAGNOSIS — R21 RASH AND NONSPECIFIC SKIN ERUPTION: ICD-10-CM

## 2019-01-07 DIAGNOSIS — T78.40XA ALLERGIC REACTION, INITIAL ENCOUNTER: Primary | ICD-10-CM

## 2019-01-07 PROCEDURE — 99215 OFFICE O/P EST HI 40 MIN: CPT | Mod: S$GLB,,, | Performed by: INTERNAL MEDICINE

## 2019-01-07 PROCEDURE — 99499 RISK ADDL DX/OHS AUDIT: ICD-10-PCS | Mod: HCNC,S$GLB,, | Performed by: INTERNAL MEDICINE

## 2019-01-07 PROCEDURE — 99284 EMERGENCY DEPT VISIT MOD MDM: CPT

## 2019-01-07 PROCEDURE — 99215 PR OFFICE/OUTPT VISIT, EST, LEVL V, 40-54 MIN: ICD-10-PCS | Mod: S$GLB,,, | Performed by: INTERNAL MEDICINE

## 2019-01-07 PROCEDURE — 3008F PR BODY MASS INDEX (BMI) DOCUMENTED: ICD-10-PCS | Mod: CPTII,S$GLB,, | Performed by: INTERNAL MEDICINE

## 2019-01-07 PROCEDURE — 3079F PR MOST RECENT DIASTOLIC BLOOD PRESSURE 80-89 MM HG: ICD-10-PCS | Mod: CPTII,S$GLB,, | Performed by: INTERNAL MEDICINE

## 2019-01-07 PROCEDURE — 3074F PR MOST RECENT SYSTOLIC BLOOD PRESSURE < 130 MM HG: ICD-10-PCS | Mod: CPTII,S$GLB,, | Performed by: INTERNAL MEDICINE

## 2019-01-07 PROCEDURE — 3008F BODY MASS INDEX DOCD: CPT | Mod: CPTII,S$GLB,, | Performed by: INTERNAL MEDICINE

## 2019-01-07 PROCEDURE — 3074F SYST BP LT 130 MM HG: CPT | Mod: CPTII,S$GLB,, | Performed by: INTERNAL MEDICINE

## 2019-01-07 PROCEDURE — 99499 UNLISTED E&M SERVICE: CPT | Mod: HCNC,S$GLB,, | Performed by: INTERNAL MEDICINE

## 2019-01-07 PROCEDURE — 3079F DIAST BP 80-89 MM HG: CPT | Mod: CPTII,S$GLB,, | Performed by: INTERNAL MEDICINE

## 2019-01-07 RX ORDER — DIPHENHYDRAMINE HCL 25 MG
25 CAPSULE ORAL EVERY 6 HOURS PRN
Qty: 20 CAPSULE | Refills: 0 | COMMUNITY
Start: 2019-01-07 | End: 2020-06-12

## 2019-01-07 RX ORDER — TRIAMCINOLONE ACETONIDE 1 MG/G
CREAM TOPICAL 2 TIMES DAILY
Qty: 1 TUBE | Refills: 0 | Status: SHIPPED | OUTPATIENT
Start: 2019-01-07 | End: 2019-01-08 | Stop reason: ALTCHOICE

## 2019-01-07 RX ORDER — CETIRIZINE HYDROCHLORIDE 10 MG/1
10 TABLET ORAL DAILY
Qty: 30 TABLET | Refills: 0 | COMMUNITY
Start: 2019-01-07 | End: 2020-06-12

## 2019-01-07 NOTE — ED TRIAGE NOTES
53 y.o. female presents to ER   Chief Complaint   Patient presents with    Allergic Reaction   Reports itchy rash to chest from dicyclomine on Saturday. No acute distress noted.

## 2019-01-07 NOTE — TELEPHONE ENCOUNTER
Please let Ms Terrell know that Dr Wright does not think her rash is related to the topirmate, she can resume it tomorrow.    She should definitely attend her Derm appointment on 1/14/19, and not apply any jewelry or irritating products to the area    ELOINA Alfred

## 2019-01-07 NOTE — TELEPHONE ENCOUNTER
Dr Mario,  Ms Roper has a new rash on her neck today. Could this be related to topiramate? It does not look like the typical mobilliform rash associated with topiramate. She has taken it with no issues for over a year.    It also seems similar to acanthosis nigricans. I'm sending her to Derm for further evaluation.    Thanks,  ELOINA (PCP)

## 2019-01-07 NOTE — ASSESSMENT & PLAN NOTE
Differential: allergic reaction to bentyl, topimax rash, acanthosis nigricans  · Dermatology referral  · Message sent to Dr Mario regarding topiramate

## 2019-01-07 NOTE — ED PROVIDER NOTES
"Encounter Date: 1/7/2019       History     Chief Complaint   Patient presents with    Allergic Reaction     Dai Roper is a 53 y.o. Female who presents to the ED with reports of rash to anterior neck.  Patient reports was seen in the ED 1/5 for diarrhea and given an injection of Bentyl.  Patient reports was discharged prescription for Bentyl and Zyrtec.  Patient reports when she got home that night she began itching " all over," but mainly on the neck.  Patient reports she noticed a rash on neck the next day.  Patient denies shortness of breath or chest pain. Denies taking any over-the-counter medication for rash.       The history is provided by the patient.     Review of patient's allergies indicates:   Allergen Reactions    Vitamin d analogue Edema     NOT allergy, but reaction to high dose vitamin D     Past Medical History:   Diagnosis Date    Anxiety     Asthma     Depression     Diabetes mellitus     DVT (deep venous thrombosis)     Hx of psychiatric care     Hypertension     Psychiatric problem     Sleep difficulties      Past Surgical History:   Procedure Laterality Date    CARPAL TUNNEL RELEASE      HERNIA REPAIR      TUBAL LIGATION       Family History   Problem Relation Age of Onset    Diabetes Mother     Heart disease Mother     Hypertension Mother     Lupus Mother     Depression Mother     Anxiety disorder Mother     Heart disease Father     Hypertension Father     Hypertension Sister     Lupus Sister     No Known Problems Brother     Anxiety disorder Daughter     Depression Daughter     Depression Son     Anxiety disorder Son     Hypertension Sister     Hypertension Sister     Ovarian cancer Maternal Aunt      Social History     Tobacco Use    Smoking status: Never Smoker    Smokeless tobacco: Never Used   Substance Use Topics    Alcohol use: No    Drug use: No     Review of Systems   Constitutional: Negative.  Negative for chills and fever.   HENT: " Negative.  Negative for congestion, ear pain, postnasal drip, rhinorrhea and sore throat.    Eyes: Negative.    Respiratory: Negative.  Negative for choking, chest tightness and shortness of breath.    Cardiovascular: Negative.  Negative for chest pain.   Gastrointestinal: Negative.  Negative for abdominal pain and nausea.   Endocrine: Negative.    Genitourinary: Negative.  Negative for dysuria, frequency and urgency.   Musculoskeletal: Negative.  Negative for back pain.   Skin: Positive for rash.        Rash to anterior neck with associated itching. Denies drainage or associated redness with rash.    Allergic/Immunologic: Negative.    Neurological: Negative.  Negative for weakness.   Hematological: Negative.  Does not bruise/bleed easily.   Psychiatric/Behavioral: Negative.        Physical Exam     Initial Vitals [01/07/19 1534]   BP Pulse Resp Temp SpO2   124/79 84 20 96.7 °F (35.9 °C) 99 %      MAP       --         Physical Exam    Nursing note and vitals reviewed.  Constitutional: She appears well-developed and well-nourished.   HENT:   Head: Normocephalic and atraumatic.   Eyes: Conjunctivae and EOM are normal. Pupils are equal, round, and reactive to light.   Neck: Neck supple.   Cardiovascular: Normal rate, regular rhythm, normal heart sounds and intact distal pulses.   Pulmonary/Chest: Breath sounds normal.   Abdominal: Soft. Bowel sounds are normal.   Musculoskeletal: Normal range of motion.   Neurological: She is alert and oriented to person, place, and time. She has normal strength.   Skin: Skin is warm and dry. Rash noted.   Maculopapular rash to anterior neck; no crusted lesions or drainage noted. No erythema or warmth to touch.    Psychiatric: She has a normal mood and affect. Her behavior is normal. Judgment and thought content normal.         ED Course   Procedures  Labs Reviewed - No data to display       Imaging Results    None                               Clinical Impression:   The encounter  diagnosis was Allergic reaction, initial encounter.      Disposition:   Disposition: Discharged  Condition: Stable    Discharged with rx for triamcinolone ointment, zyrtec, and benadryl. Instructed to take benadryl at night due to sedative effects and zyrtec in the AM. F/u with dermatologist as scheduled per referral.     The patient acknowledges that close follow up with medical provider is required. Instructed to follow up with PCP within 2 days. Patient was given specific return precautions. The patient agrees to comply with all instruction and directions given in the ER.                    Ailyn Hawkins NP  01/07/19 2880

## 2019-01-07 NOTE — ASSESSMENT & PLAN NOTE
Patient with no smoking history, unaware of pulm work-up. Questionable compliance with PETERSON, inhaled steroids. Differential includes asthma vs [facility]. Symptoms improved with weight loss  · No-show to previous PFT  · Continue PETERSON, nasal corticosteroids  · Taking as needed  · Advised to continue losing weight

## 2019-01-07 NOTE — ASSESSMENT & PLAN NOTE
PHQ-9 score of 15 with previous history of depression requiring medical treatment. No SI/HI, endorses caretaker burnout  · Increase duloxetine to 90mg  · Take in morning due to insomnia complaints  · Continue psychiatric care at [facility]  · Continue psychology treatment in [facility]

## 2019-01-07 NOTE — PATIENT INSTRUCTIONS
TODAY:  - PCP messaged Dr Mario about new rash and possibility of it being caused by topiramate   - hold topiramate until PCP hears back  - Referral to Dermatology for rash eval

## 2019-01-07 NOTE — PROGRESS NOTES
Primary Care Provider Appointment- ED VISIT    Subjective:      Patient ID: Dai Roper is a 53 y.o. female with MDD, LANIE, frequent ED visits for acute symptoms    Chief Complaint: Allergic Reaction    Patient seen in [facility] ED over the weekend for diarrhea. She was started on  Metformin on 12/21/18 in her pill packs. She is states that she eats before her metformin. In ED she was given dicyclomine IM injection, now has rash on her neck (photo taken). Also given scripts for bentyl and zyrtec.    Patient has been taking topiramate for over a year. Has never had rash before. Seen by bariatric clinic on 1/2/19 for eval for bariatric surgery. Seen by psychology and nutritionist.    Was also seen in ED on 10/10/18 for nausea/vomiting, and provided supportive care.        Adherence packed for 28 days in EZY dose Weekly packs     Morning:  ASA 81mg  Duloxetine 30mg  Duloxetine 60mg to equal 90mg PO once daily  Metformin ER 500mg  Topiramate 50mg     Evening:  Metformin ER 500mg  Atorvastatin 40mg  Topiramate 50mg  Vitamin D3 5,000IU                Electronically signed by Alfie Merida, PharmD at 12/21/2018  3:33 PM       Past Surgical History:   Procedure Laterality Date    CARPAL TUNNEL RELEASE      HERNIA REPAIR      TUBAL LIGATION         Past Medical History:   Diagnosis Date    Anxiety     Asthma     Depression     Diabetes mellitus     DVT (deep venous thrombosis)     Hx of psychiatric care     Hypertension     Psychiatric problem     Sleep difficulties        Review of Systems   Constitutional: Positive for activity change and appetite change. Negative for fever.   Cardiovascular: Positive for leg swelling.   Gastrointestinal: Positive for diarrhea. Negative for constipation.   Musculoskeletal: Positive for arthralgias, joint swelling and myalgias.   Hematological: Does not bruise/bleed easily.   Psychiatric/Behavioral: Positive for decreased concentration, dysphoric mood and sleep  "disturbance. Negative for behavioral problems and confusion. The patient is nervous/anxious.        Objective:   /82 (BP Location: Left arm, Patient Position: Sitting, BP Method: Medium (Manual))   Pulse 71   Ht 5' 2" (1.575 m)   Wt 109.4 kg (241 lb 2.9 oz)   LMP 02/05/2013 (LMP Unknown)   SpO2 99%   BMI 44.11 kg/m²     Physical Exam   Constitutional: She is oriented to person, place, and time. She appears well-developed and well-nourished.   obese   HENT:   Head: Normocephalic and atraumatic.   Dental caries   Neck: Normal range of motion.   Cardiovascular: Normal rate.   Pulmonary/Chest: Effort normal.   Abdominal:   Obese abdomen   Musculoskeletal: She exhibits no edema.   Neurological: She is alert and oriented to person, place, and time.   Skin:   L breast with soreness at biopsy site  No evidence of infection   Psychiatric: She has a normal mood and affect. Her behavior is normal. Thought content normal.   Vitals reviewed.      Lab Results   Component Value Date    WBC 4.78 01/05/2019    HGB 12.8 01/05/2019    HCT 38.8 01/05/2019     01/05/2019    CHOL 251 (H) 12/21/2018    TRIG 107 12/21/2018    HDL 74 12/21/2018    ALT 13 01/05/2019    AST 14 01/05/2019     01/05/2019    K 3.9 01/05/2019     01/05/2019    CREATININE 0.9 01/05/2019    BUN 13 01/05/2019    CO2 25 01/05/2019    TSH 0.914 12/21/2018    INR 0.9 08/28/2017    HGBA1C 5.6 12/21/2018             Assessment:   53 y.o. female with multiple co-morbid illnesses here to continue work-up of chronic issues notably MDD, LANIE, frequent ED visits for acute symptoms.     Plan:     Problem List Items Addressed This Visit        Psychiatric    Moderate episode of recurrent major depressive disorder     PHQ-9 score of 15 with previous history of depression requiring medical treatment. No SI/HI, endorses caretaker burnout  · Increase duloxetine to 90mg  · Take in morning due to insomnia complaints  · Continue psychiatric care at " [facility]  · Continue psychology treatment in [facility]            Derm    Rash and nonspecific skin eruption     Differential: allergic reaction to bentyl, topimax rash, acanthosis nigricans  · Dermatology referral  · Message sent to Dr Mario regarding topiramate         Relevant Orders    Ambulatory Referral to Dermatology       Pulmonary    Chronic obstructive asthma     Patient with no smoking history, unaware of pulm work-up. Questionable compliance with PETERSON, inhaled steroids. Differential includes asthma vs [facility]. Symptoms improved with weight loss  · No-show to previous PFT  · Continue PETERSON, nasal corticosteroids  · Taking as needed  · Advised to continue losing weight               Health Maintenance       Date Due Completion Date    Colonoscopy 06/30/2013 ---    TETANUS VACCINE 01/06/2016 1/6/2006    Pap Smear with HPV Cotest 05/11/2019 5/11/2016 (Done)    Override on 5/11/2016: Done (normal PAP at [facility])    Foot Exam 05/15/2019 5/15/2018 (Done)    Override on 5/15/2018: Done (dr fernandez)    Override on 6/22/2017: Done    Hemoglobin A1c 06/21/2019 12/21/2018    Eye Exam 08/28/2019 8/28/2018    Lipid Panel 12/21/2019 12/21/2018    Urine Microalbumin 12/21/2019 12/21/2018    Low Dose Statin 01/04/2020 1/4/2019    Mammogram 12/07/2020 12/7/2018          Follow-up in about 1 week (around 1/14/2019). One hour spent with this patient today, half of that in counseling.    Sumi Corrigan MD/MPH  Internal Medicine  [facility]  [phone]

## 2019-01-07 NOTE — LETTER
January 7, 2019               [facility]  [address]  [facility] [address]  Phone: [phone]  Fax: [phone]   January 7, 2019     Patient: Dai Rpoer   YOB: 1965   Date of Visit: 1/7/2019       To Whom it May Concern:    Dai Roper was seen in my clinic on 1/7/2019. She may return to work on 1/8/19.    If you have any questions or concerns, please don't hesitate to call.    Sincerely,         Sumi Corrigan MD

## 2019-01-08 ENCOUNTER — OFFICE VISIT (OUTPATIENT)
Dept: URGENT CARE | Facility: CLINIC | Age: 54
End: 2019-01-08
Payer: MEDICARE

## 2019-01-08 VITALS
TEMPERATURE: 99 F | BODY MASS INDEX: 44.35 KG/M2 | OXYGEN SATURATION: 98 % | SYSTOLIC BLOOD PRESSURE: 113 MMHG | WEIGHT: 241 LBS | RESPIRATION RATE: 18 BRPM | HEART RATE: 82 BPM | DIASTOLIC BLOOD PRESSURE: 81 MMHG | HEIGHT: 62 IN

## 2019-01-08 DIAGNOSIS — L30.9 DERMATITIS: Primary | ICD-10-CM

## 2019-01-08 PROCEDURE — 99214 PR OFFICE/OUTPT VISIT, EST, LEVL IV, 30-39 MIN: ICD-10-PCS | Mod: S$GLB,,, | Performed by: NURSE PRACTITIONER

## 2019-01-08 PROCEDURE — 3079F DIAST BP 80-89 MM HG: CPT | Mod: CPTII,S$GLB,, | Performed by: NURSE PRACTITIONER

## 2019-01-08 PROCEDURE — 3079F PR MOST RECENT DIASTOLIC BLOOD PRESSURE 80-89 MM HG: ICD-10-PCS | Mod: CPTII,S$GLB,, | Performed by: NURSE PRACTITIONER

## 2019-01-08 PROCEDURE — 3008F PR BODY MASS INDEX (BMI) DOCUMENTED: ICD-10-PCS | Mod: CPTII,S$GLB,, | Performed by: NURSE PRACTITIONER

## 2019-01-08 PROCEDURE — 3074F SYST BP LT 130 MM HG: CPT | Mod: CPTII,S$GLB,, | Performed by: NURSE PRACTITIONER

## 2019-01-08 PROCEDURE — 3008F BODY MASS INDEX DOCD: CPT | Mod: CPTII,S$GLB,, | Performed by: NURSE PRACTITIONER

## 2019-01-08 PROCEDURE — 99214 OFFICE O/P EST MOD 30 MIN: CPT | Mod: S$GLB,,, | Performed by: NURSE PRACTITIONER

## 2019-01-08 PROCEDURE — 3074F PR MOST RECENT SYSTOLIC BLOOD PRESSURE < 130 MM HG: ICD-10-PCS | Mod: CPTII,S$GLB,, | Performed by: NURSE PRACTITIONER

## 2019-01-08 RX ORDER — TRIAMCINOLONE ACETONIDE 1 MG/G
CREAM TOPICAL 2 TIMES DAILY
Qty: 1 TUBE | Refills: 0 | Status: SHIPPED | OUTPATIENT
Start: 2019-01-08 | End: 2019-01-08

## 2019-01-08 NOTE — PROGRESS NOTES
"Subjective:       Patient ID: Dai Roper is a 53 y.o. female.    Vitals:  height is 5' 2" (1.575 m) and weight is 109.3 kg (241 lb). Her temperature is 98.7 °F (37.1 °C). Her blood pressure is 113/81 and her pulse is 82. Her respiration is 18 and oxygen saturation is 98%.     Chief Complaint: Rash    This is a 53 y.o. female who presents today with a chief complaint of a rash on her neck, back, and under her breast for 4 days.        Rash   This is a new problem. The current episode started in the past 7 days. The problem has been gradually worsening since onset. The affected locations include the neck and back (under both breast). The rash is characterized by itchiness and dryness. It is unknown if there was an exposure to a precipitant. Pertinent negatives include no cough, fever or sore throat. Treatments tried: calamine lotion. The treatment provided no relief.       Constitution: Negative for chills and fever.   HENT: Negative for facial swelling and sore throat.    Neck: Negative for painful lymph nodes.   Eyes: Negative for eye itching and eyelid swelling.   Respiratory: Negative for cough.    Musculoskeletal: Negative for joint pain and joint swelling.   Skin: Positive for rash. Negative for color change, pale, wound, abrasion, laceration, lesion, skin thickening/induration, puncture wound, erythema, bruising, abscess, avulsion and hives.   Allergic/Immunologic: Positive for itching. Negative for environmental allergies, immunocompromised state and hives.   Hematologic/Lymphatic: Negative for swollen lymph nodes.       Objective:      Physical Exam   Constitutional: She is oriented to person, place, and time. She appears well-developed and well-nourished.   HENT:   Head: Normocephalic and atraumatic. Head is without abrasion, without contusion and without laceration.   Right Ear: External ear normal.   Left Ear: External ear normal.   Nose: Nose normal.   Mouth/Throat: Oropharynx is clear and " moist.   Eyes: Conjunctivae, EOM and lids are normal. Pupils are equal, round, and reactive to light.   Neck: Trachea normal, full passive range of motion without pain and phonation normal. Neck supple.   Cardiovascular: Normal rate, regular rhythm and normal heart sounds.   Pulmonary/Chest: Effort normal and breath sounds normal. No stridor. No respiratory distress.   Musculoskeletal: Normal range of motion.   Neurological: She is alert and oriented to person, place, and time.   Skin: Skin is warm, dry and intact. Capillary refill takes less than 2 seconds. Rash noted. No abrasion, no bruising, no burn, no ecchymosis, no laceration and no lesion noted. Rash is maculopapular (with scales noted to rash). No erythema.   Psychiatric: She has a normal mood and affect. Her speech is normal and behavior is normal. Judgment and thought content normal. Cognition and memory are normal.   Nursing note and vitals reviewed.      Assessment:       1. Dermatitis        Plan:       Advised to take the Triamcinolone from ER (1/7/2018) no more than 2 weeks at a time. Continue with zyrtec and benadryl as advised in the ER.  Keep appointment with Dermatology.  Verbalized understanding.     Dermatitis  -     Discontinue: triamcinolone acetonide 0.1% (KENALOG) 0.1 % cream; Apply topically 2 (two) times daily. Use no more than 2 weeks  Dispense: 1 Tube; Refill: 0          Patient Instructions   Please follow up with your Primary care provider within 2-5 days if your signs and symptoms have not resolved or worsen.     If your condition worsens or fails to improve we recommend that you receive another evaluation at the emergency room immediately or contact your primary medical clinic to discuss your concerns.    You must understand that you have received an Urgent Care treatment only and that you may be released before all of your medical problems are known or treated.   You, the patient, will arrange for follow up care as instructed.      Skin Rash    If your condition worsens or fails to improve we recommend that you receive another evaluation at the ER immediately or contact your PCP to discuss your concerns or return here.     If you develop additional symptoms such as tongue, mouth or throat swelling or any  trouble breathing go immediately to the ER or call 911.     You must understand that you've received an urgent care treatment only and that you may be released before all your medical problems are known or treated. You the patient will arrange for followup care as instructed.     Zyrtec, Claritin, Xyzal or Allegra should be used daily for the next 7-10 days to prevent or suppress the itching. You can take Benedryl 25 mg at bedtime as well.     Steroid Use    If you had a steroid injection in the clinic and you were given an oral steroid today, do not start the oral prescription until tomorrow.     As discussed, steroid use can elevate your blood pressure, elevate your blood sugar, water weight gain, nervous energy, redness to the face and dimpling of the skin at the injection site.     Oral steroids are hard on the stomach and should always be taken with food.      Once starting a steroid pack, you should complete it as directed.  If you abruptly discontinue the steroid, contact your PCP prior to doing this.      Please do not scratch the areas as they can become infected.  If you notice puss, fever, redness, increased warmth or red streaking from the area, please see your PCP or Urgent care as soon as possible.          Atopic Dermatitis (Adult)  Atopic dermatitis is a dry, itchy, red rash. Its also called eczema. The rash is chronic, or ongoing. It can come and go over time. The disease is often passed down in families. It causes a problem with the skin barrier that makes the skin more sensitive to the environment and other factors. The increased skin sensitivity causes an itch, which causes scratching. Scratching can worsen the  itching or also break the skin. This can put the skin at risk of infection.  The condition is most common in people with asthma, hay fever, hives, or dry or sensitive skin. The rash may be caused by extreme heat or heavy sweating. Skin irritants can cause the rash to flare up. These can include wool or silk clothing, grease, oils, some medicines, and harsh soaps and detergents. Emotional stress can also be a trigger.  Treatment is done to relieve the itching and inflammation of the skin.  Home care  Follow these tips to care for your condition:  · Keep the areas of rash clean by bathing at least every other day. Use lukewarm water to bathe. Dont use hot water, which can dry out the skin.  · Dont use soaps with strong detergents. Use mild soaps made for sensitive skin.  · Apply a cream or ointment to damp skin right after bathing.  · Avoid things that irritate your skin. Wear absorbent, soft fabrics next to the skin rather than rough or scratchy materials.  · Use mild laundry soap free of scents and perfumes. Make sure to rinse all the soap out of your clothes.  · Treat any skin infection as directed.  · Use oral diphenhydramine to help reduce itching. This is an antihistamine you can buy at drug and grocery stores. It can make you sleepy, so use lower doses during the daytime. Or you can use loratadine. This is an antihistamine that will not make you sleepy. Do not use diphenhydramine if you have glaucoma or have trouble urinating due to an enlarged prostate.  Follow-up care  See your healthcare provider, or as advised. If your symptoms dont get better or if they get worse in the next 7 days, make an appointment with your healthcare provider.  When to seek medical advice  Call your healthcare provider right away  if any of these occur:  · Increasing area of redness or pain in the skin  · Yellow crusts or wet drainage from the rash  · Fever of 100.4°F (38°C) or higher, or as directed by your healthcare  provider  Date Last Reviewed: 9/1/2016  © 3795-3845 [employer]. [address] [place] [address], [place] [address]. All rights reserved. This information is not intended as a substitute for professional medical care. Always follow your healthcare professional's instructions.

## 2019-01-08 NOTE — PATIENT INSTRUCTIONS
Please follow up with your Primary care provider within 2-5 days if your signs and symptoms have not resolved or worsen.     If your condition worsens or fails to improve we recommend that you receive another evaluation at the emergency room immediately or contact your primary medical clinic to discuss your concerns.    You must understand that you have received an Urgent Care treatment only and that you may be released before all of your medical problems are known or treated.   You, the patient, will arrange for follow up care as instructed.     Skin Rash    If your condition worsens or fails to improve we recommend that you receive another evaluation at the ER immediately or contact your PCP to discuss your concerns or return here.     If you develop additional symptoms such as tongue, mouth or throat swelling or any  trouble breathing go immediately to the ER or call 911.     You must understand that you've received an urgent care treatment only and that you may be released before all your medical problems are known or treated. You the patient will arrange for followup care as instructed.     Zyrtec, Claritin, Xyzal or Allegra should be used daily for the next 7-10 days to prevent or suppress the itching. You can take Benedryl 25 mg at bedtime as well.     Steroid Use    If you had a steroid injection in the clinic and you were given an oral steroid today, do not start the oral prescription until tomorrow.     As discussed, steroid use can elevate your blood pressure, elevate your blood sugar, water weight gain, nervous energy, redness to the face and dimpling of the skin at the injection site.     Oral steroids are hard on the stomach and should always be taken with food.      Once starting a steroid pack, you should complete it as directed.  If you abruptly discontinue the steroid, contact your PCP prior to doing this.      Please do not scratch the areas as they can become infected.  If you notice puss, fever,  redness, increased warmth or red streaking from the area, please see your PCP or Urgent care as soon as possible.          Atopic Dermatitis (Adult)  Atopic dermatitis is a dry, itchy, red rash. Its also called eczema. The rash is chronic, or ongoing. It can come and go over time. The disease is often passed down in families. It causes a problem with the skin barrier that makes the skin more sensitive to the environment and other factors. The increased skin sensitivity causes an itch, which causes scratching. Scratching can worsen the itching or also break the skin. This can put the skin at risk of infection.  The condition is most common in people with asthma, hay fever, hives, or dry or sensitive skin. The rash may be caused by extreme heat or heavy sweating. Skin irritants can cause the rash to flare up. These can include wool or silk clothing, grease, oils, some medicines, and harsh soaps and detergents. Emotional stress can also be a trigger.  Treatment is done to relieve the itching and inflammation of the skin.  Home care  Follow these tips to care for your condition:  · Keep the areas of rash clean by bathing at least every other day. Use lukewarm water to bathe. Dont use hot water, which can dry out the skin.  · Dont use soaps with strong detergents. Use mild soaps made for sensitive skin.  · Apply a cream or ointment to damp skin right after bathing.  · Avoid things that irritate your skin. Wear absorbent, soft fabrics next to the skin rather than rough or scratchy materials.  · Use mild laundry soap free of scents and perfumes. Make sure to rinse all the soap out of your clothes.  · Treat any skin infection as directed.  · Use oral diphenhydramine to help reduce itching. This is an antihistamine you can buy at drug and grocery stores. It can make you sleepy, so use lower doses during the daytime. Or you can use loratadine. This is an antihistamine that will not make you sleepy. Do not use  diphenhydramine if you have glaucoma or have trouble urinating due to an enlarged prostate.  Follow-up care  See your healthcare provider, or as advised. If your symptoms dont get better or if they get worse in the next 7 days, make an appointment with your healthcare provider.  When to seek medical advice  Call your healthcare provider right away  if any of these occur:  · Increasing area of redness or pain in the skin  · Yellow crusts or wet drainage from the rash  · Fever of 100.4°F (38°C) or higher, or as directed by your healthcare provider  Date Last Reviewed: 9/1/2016  © 8840-0290 [employer]. [address] [place] [address], [place] [address]. All rights reserved. This information is not intended as a substitute for professional medical care. Always follow your healthcare professional's instructions.

## 2019-01-08 NOTE — LETTER
January 8, 2019      [facility]  [address] [address], [address]  [place] [address]  Phone: [phone]  Fax: [phone]       Patient: Dai Roper   YOB: 1965  Date of Visit: 01/08/2019    To Whom It May Concern:    Lexie Roper  was at [facility] on 01/08/2019. She may return to work/school on 1/9/2019 with no restrictions. If you have any questions or concerns, or if I can be of further assistance, please do not hesitate to contact me.    Sincerely,    Kathryn Monae NP

## 2019-01-12 ENCOUNTER — TELEPHONE (OUTPATIENT)
Dept: URGENT CARE | Facility: CLINIC | Age: 54
End: 2019-01-12

## 2019-01-15 ENCOUNTER — LAB VISIT (OUTPATIENT)
Dept: LAB | Facility: HOSPITAL | Age: 54
End: 2019-01-15
Attending: INTERNAL MEDICINE
Payer: MEDICARE

## 2019-01-15 DIAGNOSIS — Z12.11 COLON CANCER SCREENING: ICD-10-CM

## 2019-01-15 PROCEDURE — 82274 ASSAY TEST FOR BLOOD FECAL: CPT

## 2019-01-18 ENCOUNTER — OFFICE VISIT (OUTPATIENT)
Dept: PRIMARY CARE CLINIC | Facility: CLINIC | Age: 54
End: 2019-01-18
Payer: MEDICARE

## 2019-01-18 VITALS
HEIGHT: 62 IN | WEIGHT: 239 LBS | HEART RATE: 74 BPM | OXYGEN SATURATION: 99 % | BODY MASS INDEX: 43.98 KG/M2 | SYSTOLIC BLOOD PRESSURE: 100 MMHG | DIASTOLIC BLOOD PRESSURE: 76 MMHG

## 2019-01-18 DIAGNOSIS — G47.00 INSOMNIA, UNSPECIFIED TYPE: ICD-10-CM

## 2019-01-18 DIAGNOSIS — N64.4 PAIN OF LEFT BREAST: ICD-10-CM

## 2019-01-18 DIAGNOSIS — E11.8 TYPE 2 DIABETES MELLITUS WITH COMPLICATION, WITHOUT LONG-TERM CURRENT USE OF INSULIN: ICD-10-CM

## 2019-01-18 PROCEDURE — 3008F BODY MASS INDEX DOCD: CPT | Mod: CPTII,S$GLB,, | Performed by: INTERNAL MEDICINE

## 2019-01-18 PROCEDURE — 99215 PR OFFICE/OUTPT VISIT, EST, LEVL V, 40-54 MIN: ICD-10-PCS | Mod: S$GLB,,, | Performed by: INTERNAL MEDICINE

## 2019-01-18 PROCEDURE — 3078F PR MOST RECENT DIASTOLIC BLOOD PRESSURE < 80 MM HG: ICD-10-PCS | Mod: CPTII,S$GLB,, | Performed by: INTERNAL MEDICINE

## 2019-01-18 PROCEDURE — 3044F HG A1C LEVEL LT 7.0%: CPT | Mod: CPTII,S$GLB,, | Performed by: INTERNAL MEDICINE

## 2019-01-18 PROCEDURE — 99499 RISK ADDL DX/OHS AUDIT: ICD-10-PCS | Mod: HCNC,S$GLB,, | Performed by: INTERNAL MEDICINE

## 2019-01-18 PROCEDURE — 3074F PR MOST RECENT SYSTOLIC BLOOD PRESSURE < 130 MM HG: ICD-10-PCS | Mod: CPTII,S$GLB,, | Performed by: INTERNAL MEDICINE

## 2019-01-18 PROCEDURE — 3008F PR BODY MASS INDEX (BMI) DOCUMENTED: ICD-10-PCS | Mod: CPTII,S$GLB,, | Performed by: INTERNAL MEDICINE

## 2019-01-18 PROCEDURE — 99215 OFFICE O/P EST HI 40 MIN: CPT | Mod: S$GLB,,, | Performed by: INTERNAL MEDICINE

## 2019-01-18 PROCEDURE — 99499 UNLISTED E&M SERVICE: CPT | Mod: HCNC,S$GLB,, | Performed by: INTERNAL MEDICINE

## 2019-01-18 PROCEDURE — 3074F SYST BP LT 130 MM HG: CPT | Mod: CPTII,S$GLB,, | Performed by: INTERNAL MEDICINE

## 2019-01-18 PROCEDURE — 3044F PR MOST RECENT HEMOGLOBIN A1C LEVEL <7.0%: ICD-10-PCS | Mod: CPTII,S$GLB,, | Performed by: INTERNAL MEDICINE

## 2019-01-18 PROCEDURE — 3078F DIAST BP <80 MM HG: CPT | Mod: CPTII,S$GLB,, | Performed by: INTERNAL MEDICINE

## 2019-01-18 NOTE — ASSESSMENT & PLAN NOTE
S/p L breast biopsy on Tues 12/18, uncontrolled pain today  · continue tylenol for pain, also sparingly take ibuprofren 800mg TID for 3 days  · Continue heating pad for breast

## 2019-01-18 NOTE — ASSESSMENT & PLAN NOTE
A1c 5.9 in 4/2017, compliant metformin 500mg daily, diarrhea resolved.  · Advised to cut back on high-glycemic foods  · Reports exercising  · Patient restarted metformin 500 BID   · STRONGLY encouraged to exercise  · Diabetic eye screening photo 1/2018  · No DR  · Seen by optho 8/2018  · F/U PRN

## 2019-01-18 NOTE — PATIENT INSTRUCTIONS
TODAY:  - continue supportive care for L breast   + heating pad   + tylenol   + minimal NSAID use (be careful with ibuprofen)  - call this office or see your psychiatrist when you need to talk to someone  - appts with Dr Briggs

## 2019-01-18 NOTE — MEDICAL/APP STUDENT
"Brief history with pt, states she is here for a regular check-up, and complaint of a pain on and around her left breast. Pt describes pain as sharp almost like a "gas pain" and expresses exacerbation upon movement. Describes pain radiating under, around breast and moving to her side and back. The pain keeps her tossing and turning at night, patient states it is like a burning sensation. The pain began a couple of days ago, and the pt has not experienced pain like this before. Pt has been using a hot pad to relieve pain but it has not been working.     - SH   "
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
## 2019-01-21 ENCOUNTER — TELEPHONE (OUTPATIENT)
Dept: INTERNAL MEDICINE | Facility: CLINIC | Age: 54
End: 2019-01-21

## 2019-01-21 NOTE — TELEPHONE ENCOUNTER
Please call diann and see how she is doing. We needed to make a psych appointment for her, but she left clinic before it was done. If she agrees can you do a conference call with the psych line to get her an appointments with Guerline Harmon and Earl.    Thanks,  KJ

## 2019-01-22 NOTE — TELEPHONE ENCOUNTER
----- Message from Alex Núñez sent at 1/22/2019  3:38 PM CST -----  Called patient at around 1000 and 1530. No response.

## 2019-01-23 NOTE — TELEPHONE ENCOUNTER
Called patient and scheduled appointment with psychiatry. Dr. Harmon is no longer doing outpatient work and Dr. Elliott is only doing therapy, but Miss Roper wanted to both therapy and medication management, so she has appointment with JASPAL Brown on April 10 at 8 am.

## 2019-01-23 NOTE — TELEPHONE ENCOUNTER
"She agreed to PHQ-9. Her score was 10/27 which corresponds with "moderate depression." She denied thoughts of harming herself. It seemed like she was being candid about her depression. "

## 2019-01-25 ENCOUNTER — TELEPHONE (OUTPATIENT)
Dept: PRIMARY CARE CLINIC | Facility: CLINIC | Age: 54
End: 2019-01-25

## 2019-01-25 NOTE — TELEPHONE ENCOUNTER
Pt called to say her pill pack is wrong she said her medication is packed for every 3 hours. Pt said she only takes meds twice daily. Pt will return current meds today to get them repacked. Please, advise. Thanks.

## 2019-01-31 NOTE — PROGRESS NOTES
Adherence packed for 28 days in SUREMED dose Weekly packs, repacked per pt request     Morning slot:  ASA 81mg  Duloxetine 30mg  Duloxetine 60mg to equal 90mg PO once daily  Metformin ER 500mg  Topiramate 50mg     Evening slot:  Metformin ER 500mg  Atorvastatin 40mg  Topiramate 50mg  Vitamin D3 5,000IU

## 2019-02-07 ENCOUNTER — TELEPHONE (OUTPATIENT)
Dept: BARIATRICS | Facility: CLINIC | Age: 54
End: 2019-02-07

## 2019-02-07 NOTE — TELEPHONE ENCOUNTER
Pt did not answer phone.  Msg says her mail box has not been set up.  Therefore unable to leave msg to try and reschedule missed nutrition appointment along with EKG and CXR.

## 2019-03-06 NOTE — PROGRESS NOTES
Adherence packed for 28 days in EZY dose Weekly packs     Morning slot:  ASA 81mg  Duloxetine 30mg  Duloxetine 60mg to equal 90mg PO once daily  Metformin ER 500mg  Topiramate 50mg     Evening slot:  Metformin ER 500mg  Atorvastatin 40mg  Topiramate 50mg  Vitamin D3 5,000IU

## 2019-03-21 ENCOUNTER — PATIENT MESSAGE (OUTPATIENT)
Dept: ADMINISTRATIVE | Facility: OTHER | Age: 54
End: 2019-03-21

## 2019-04-10 RX ORDER — ACETAMINOPHEN 500 MG
TABLET ORAL DAILY
Qty: 90 TABLET | Refills: 3 | Status: SHIPPED | OUTPATIENT
Start: 2019-04-10 | End: 2020-04-09

## 2019-04-10 NOTE — PROGRESS NOTES
Adherence packed for 28 days using Dispill:  *She prefers 4 cards with each card having morning and evening     Morning pocket:  ASA 81mg  Duloxetine 30mg  Duloxetine 60mg to equal 90mg PO once daily  Metformin ER 500mg  Topiramate 50mg     Evening pocket:  Atorvastatin 40mg  Metformin ER 500mg  Topiramate 50mg  Vitamin D3 5,000IU

## 2019-05-24 DIAGNOSIS — Z12.11 COLON CANCER SCREENING: ICD-10-CM

## 2019-05-30 ENCOUNTER — HOSPITAL ENCOUNTER (EMERGENCY)
Facility: HOSPITAL | Age: 54
Discharge: HOME | End: 2019-05-30
Attending: EMERGENCY MEDICINE
Payer: MEDICARE

## 2019-05-30 VITALS
HEART RATE: 88 BPM | WEIGHT: 236 LBS | OXYGEN SATURATION: 99 % | HEIGHT: 64 IN | TEMPERATURE: 98 F | BODY MASS INDEX: 40.29 KG/M2 | RESPIRATION RATE: 20 BRPM | SYSTOLIC BLOOD PRESSURE: 120 MMHG | DIASTOLIC BLOOD PRESSURE: 77 MMHG

## 2019-05-30 DIAGNOSIS — M79.662 PAIN AND SWELLING OF LEFT LOWER LEG: ICD-10-CM

## 2019-05-30 DIAGNOSIS — M79.89 PAIN AND SWELLING OF LEFT LOWER LEG: ICD-10-CM

## 2019-05-30 LAB
ALBUMIN SERPL BCP-MCNC: 3.8 G/DL (ref 3.5–5.2)
ALP SERPL-CCNC: 68 U/L (ref 55–135)
ALT SERPL W/O P-5'-P-CCNC: 13 U/L (ref 10–44)
ANION GAP SERPL CALC-SCNC: 7 MMOL/L (ref 8–16)
AST SERPL-CCNC: 13 U/L (ref 10–40)
BASOPHILS # BLD AUTO: 0.01 K/UL (ref 0–0.2)
BASOPHILS NFR BLD: 0.2 % (ref 0–1.9)
BILIRUB SERPL-MCNC: 0.2 MG/DL (ref 0.1–1)
BNP SERPL-MCNC: 20 PG/ML (ref 0–99)
BUN SERPL-MCNC: 11 MG/DL (ref 6–20)
CALCIUM SERPL-MCNC: 9.7 MG/DL (ref 8.7–10.5)
CHLORIDE SERPL-SCNC: 108 MMOL/L (ref 95–110)
CO2 SERPL-SCNC: 26 MMOL/L (ref 23–29)
CREAT SERPL-MCNC: 0.7 MG/DL (ref 0.5–1.4)
DIFFERENTIAL METHOD: NORMAL
EOSINOPHIL # BLD AUTO: 0.1 K/UL (ref 0–0.5)
EOSINOPHIL NFR BLD: 1.5 % (ref 0–8)
ERYTHROCYTE [DISTWIDTH] IN BLOOD BY AUTOMATED COUNT: 13.5 % (ref 11.5–14.5)
EST. GFR  (AFRICAN AMERICAN): >60 ML/MIN/1.73 M^2
EST. GFR  (NON AFRICAN AMERICAN): >60 ML/MIN/1.73 M^2
GLUCOSE SERPL-MCNC: 98 MG/DL (ref 70–110)
HCT VFR BLD AUTO: 37.9 % (ref 37–48.5)
HGB BLD-MCNC: 12.3 G/DL (ref 12–16)
LYMPHOCYTES # BLD AUTO: 2.7 K/UL (ref 1–4.8)
LYMPHOCYTES NFR BLD: 45.2 % (ref 18–48)
MAGNESIUM SERPL-MCNC: 1.8 MG/DL (ref 1.6–2.6)
MCH RBC QN AUTO: 29.1 PG (ref 27–31)
MCHC RBC AUTO-ENTMCNC: 32.5 G/DL (ref 32–36)
MCV RBC AUTO: 90 FL (ref 82–98)
MONOCYTES # BLD AUTO: 0.4 K/UL (ref 0.3–1)
MONOCYTES NFR BLD: 6.3 % (ref 4–15)
NEUTROPHILS # BLD AUTO: 2.8 K/UL (ref 1.8–7.7)
NEUTROPHILS NFR BLD: 46.8 % (ref 38–73)
PHOSPHATE SERPL-MCNC: 2.7 MG/DL (ref 2.7–4.5)
PLATELET # BLD AUTO: 234 K/UL (ref 150–350)
PMV BLD AUTO: 11.2 FL (ref 9.2–12.9)
POTASSIUM SERPL-SCNC: 3.7 MMOL/L (ref 3.5–5.1)
PROT SERPL-MCNC: 7.4 G/DL (ref 6–8.4)
RBC # BLD AUTO: 4.22 M/UL (ref 4–5.4)
SODIUM SERPL-SCNC: 141 MMOL/L (ref 136–145)
WBC # BLD AUTO: 5.89 K/UL (ref 3.9–12.7)

## 2019-05-30 PROCEDURE — 83880 ASSAY OF NATRIURETIC PEPTIDE: CPT | Mod: HCNC

## 2019-05-30 PROCEDURE — 80053 COMPREHEN METABOLIC PANEL: CPT | Mod: HCNC

## 2019-05-30 PROCEDURE — 36415 COLL VENOUS BLD VENIPUNCTURE: CPT | Mod: HCNC

## 2019-05-30 PROCEDURE — 99284 EMERGENCY DEPT VISIT MOD MDM: CPT | Mod: HCNC

## 2019-05-30 PROCEDURE — 83735 ASSAY OF MAGNESIUM: CPT | Mod: HCNC

## 2019-05-30 PROCEDURE — 84100 ASSAY OF PHOSPHORUS: CPT | Mod: HCNC

## 2019-05-30 PROCEDURE — 25000003 PHARM REV CODE 250: Mod: HCNC | Performed by: NURSE PRACTITIONER

## 2019-05-30 PROCEDURE — 85025 COMPLETE CBC W/AUTO DIFF WBC: CPT | Mod: HCNC

## 2019-05-30 RX ORDER — IBUPROFEN 800 MG/1
800 TABLET ORAL
Status: COMPLETED | OUTPATIENT
Start: 2019-05-30 | End: 2019-05-30

## 2019-05-30 RX ORDER — IBUPROFEN 800 MG/1
800 TABLET ORAL EVERY 8 HOURS PRN
Qty: 20 TABLET | Refills: 0 | Status: SHIPPED | OUTPATIENT
Start: 2019-05-30 | End: 2020-01-27

## 2019-05-30 RX ADMIN — IBUPROFEN 800 MG: 800 TABLET ORAL at 06:05

## 2019-05-30 NOTE — ED PROVIDER NOTES
Encounter Date: 5/30/2019       History     Chief Complaint   Patient presents with    Leg Pain     left lower leg     Dai Roper is a 53 y.o. female presents to the ED with reports of left lower leg swelling. Patient reports that she noticed left lower leg swelling with pain approximately 1 week ago.  She reports pain to anterior thigh down to mid-shin area.  She also reports mild pain to posterior knee.  Pain increases with ambulation.  She denies trauma.  She denies calf pain. She denies history of heart disease or heart failure.  She denies numbness or tingling to left lower extremity.  She does report having varicose veins, but reports that she has been having this for a while and that it has never bothered her before.    The history is provided by the patient.     Review of patient's allergies indicates:   Allergen Reactions    Vitamin d analogue Edema     NOT allergy, but reaction to high dose vitamin D     Past Medical History:   Diagnosis Date    Anxiety     Asthma     Depression     Diabetes mellitus     DVT (deep venous thrombosis)     Hx of psychiatric care     Hypertension     Psychiatric problem     Sleep difficulties      Past Surgical History:   Procedure Laterality Date    CARPAL TUNNEL RELEASE      HERNIA REPAIR      TUBAL LIGATION       Family History   Problem Relation Age of Onset    Diabetes Mother     Heart disease Mother     Hypertension Mother     Lupus Mother     Depression Mother     Anxiety disorder Mother     Heart disease Father     Hypertension Father     Hypertension Sister     Lupus Sister     No Known Problems Brother     Anxiety disorder Daughter     Depression Daughter     Depression Son     Anxiety disorder Son     Hypertension Sister     Hypertension Sister     Ovarian cancer Maternal Aunt      Social History     Tobacco Use    Smoking status: Never Smoker    Smokeless tobacco: Never Used   Substance Use Topics    Alcohol use: No     Drug use: No     Review of Systems   Constitutional: Negative.  Negative for activity change, chills and fever.   HENT: Negative.  Negative for congestion, ear discharge, ear pain, postnasal drip, sinus pressure, sinus pain and sore throat.    Eyes: Negative.    Respiratory: Negative.  Negative for cough, chest tightness and shortness of breath.    Cardiovascular: Positive for leg swelling. Negative for chest pain.        Left lower leg swelling and pain with ambulation.  Denies numbness or tingling to left lower extremity.   Gastrointestinal: Negative.  Negative for abdominal distention, abdominal pain and nausea.   Endocrine: Negative.    Genitourinary: Negative.  Negative for dysuria, frequency and urgency.   Musculoskeletal: Negative.  Negative for back pain.   Skin: Negative.  Negative for rash.   Allergic/Immunologic: Negative.    Neurological: Negative.  Negative for dizziness, weakness, light-headedness and numbness.   Hematological: Negative.  Does not bruise/bleed easily.   Psychiatric/Behavioral: Negative.        Physical Exam     Initial Vitals [05/30/19 1628]   BP Pulse Resp Temp SpO2   111/78 99 18 97.3 °F (36.3 °C) 97 %      MAP       --         Physical Exam    Nursing note and vitals reviewed.  Constitutional: She appears well-developed and well-nourished.   HENT:   Head: Normocephalic and atraumatic.   Right Ear: External ear normal.   Left Ear: External ear normal.   Nose: Nose normal.   Mouth/Throat: Oropharynx is clear and moist.   Eyes: Conjunctivae and EOM are normal. Pupils are equal, round, and reactive to light.   Neck: Normal range of motion. Neck supple.   Cardiovascular: Normal rate, regular rhythm, normal heart sounds and intact distal pulses.   Mild swelling to left lower extremity. Neg. Homans.    Pulmonary/Chest: Effort normal and breath sounds normal. She has no decreased breath sounds. She has no wheezes. She has no rhonchi. She has no rales.   Abdominal: Soft. Bowel sounds are  normal. There is no tenderness.   Musculoskeletal: Normal range of motion.   Full range of motion to left lower extremity.  Normal knee exam.  Negative Homans.   Neurological: She is alert and oriented to person, place, and time. She has normal strength. She displays normal reflexes. No cranial nerve deficit or sensory deficit.   Skin: Skin is warm and dry. Capillary refill takes less than 2 seconds. No rash noted.   Psychiatric: She has a normal mood and affect. Her behavior is normal. Judgment and thought content normal.         ED Course   Procedures  Labs Reviewed   COMPREHENSIVE METABOLIC PANEL - Abnormal; Notable for the following components:       Result Value    Anion Gap 7 (*)     All other components within normal limits   CBC W/ AUTO DIFFERENTIAL   B-TYPE NATRIURETIC PEPTIDE   MAGNESIUM   PHOSPHORUS          Imaging Results          US Lower Extremity Veins Left (Final result)  Result time 05/30/19 17:57:11    Final result by Jt Gore III, MD (05/30/19 17:57:11)                 Impression:      Negative for  left lower extremity DVT.      Electronically signed by: Jt Groe  Date:    05/30/2019  Time:    17:57             Narrative:    EXAMINATION:  US LOWER EXTREMITY VEINS LEFT    CLINICAL HISTORY:  Pain in left lower leg    FINDINGS:  Grayscale and color Doppler sonography was formed through the  left  lower extremity    The  left lower extremity veins are widely patent with normal augmentation, and compressibility and respiratory variability.                                  Medications   ibuprofen tablet 800 mg (800 mg Oral Given 5/30/19 1825)                          Clinical Impression:       ICD-10-CM ICD-9-CM   1. Pain and swelling of left lower leg M79.662 729.5    M79.89 729.81         Disposition:   Disposition: Discharged  Condition: Stable    Discharge Medication List as of 5/30/2019  6:29 PM      START taking these medications    Details   ibuprofen (ADVIL,MOTRIN) 800 MG  tablet Take 1 tablet (800 mg total) by mouth every 8 (eight) hours as needed., Starting Thu 5/30/2019, Normal            The patient acknowledges that close follow up with medical provider is required. Instructed to follow up with PCP within 2 days. Patient was given specific return precautions. The patient agrees to comply with all instruction and directions given in the ER.                  Ailyn Hawkins NP  05/30/19 3307

## 2019-05-31 ENCOUNTER — PES CALL (OUTPATIENT)
Dept: ADMINISTRATIVE | Facility: CLINIC | Age: 54
End: 2019-05-31

## 2019-06-19 ENCOUNTER — PES CALL (OUTPATIENT)
Dept: ADMINISTRATIVE | Facility: CLINIC | Age: 54
End: 2019-06-19

## 2019-06-21 ENCOUNTER — TELEPHONE (OUTPATIENT)
Dept: INTERNAL MEDICINE | Facility: CLINIC | Age: 54
End: 2019-06-21

## 2019-06-21 ENCOUNTER — HOSPITAL ENCOUNTER (OUTPATIENT)
Dept: RADIOLOGY | Facility: HOSPITAL | Age: 54
Discharge: HOME | End: 2019-06-21
Attending: INTERNAL MEDICINE
Payer: MEDICARE

## 2019-06-21 DIAGNOSIS — R92.8 ABNORMAL MAMMOGRAM: ICD-10-CM

## 2019-06-21 PROCEDURE — 77065 DX MAMMO INCL CAD UNI: CPT | Mod: 26,HCNC,LT, | Performed by: RADIOLOGY

## 2019-06-21 PROCEDURE — 77065 MAMMO DIGITAL DIAGNOSTIC LEFT WITH TOMOSYNTHESIS_CAD: ICD-10-PCS | Mod: 26,HCNC,LT, | Performed by: RADIOLOGY

## 2019-06-21 PROCEDURE — 77061 BREAST TOMOSYNTHESIS UNI: CPT | Mod: 26,HCNC,LT, | Performed by: RADIOLOGY

## 2019-06-21 PROCEDURE — 77065 DX MAMMO INCL CAD UNI: CPT | Mod: TC,HCNC,PO,LT

## 2019-06-21 PROCEDURE — 77061 MAMMO DIGITAL DIAGNOSTIC LEFT WITH TOMOSYNTHESIS_CAD: ICD-10-PCS | Mod: 26,HCNC,LT, | Performed by: RADIOLOGY

## 2019-07-25 ENCOUNTER — OCCUPATIONAL HEALTH (OUTPATIENT)
Dept: URGENT CARE | Facility: CLINIC | Age: 54
End: 2019-07-25
Payer: MEDICARE

## 2019-07-25 DIAGNOSIS — Z02.1 PRE-EMPLOYMENT DRUG SCREENING: Primary | ICD-10-CM

## 2019-07-25 LAB
CTP QC/QA: YES
POC 10 PANEL DRUG SCREEN: NEGATIVE

## 2019-07-25 PROCEDURE — 80305 DRUG TEST PRSMV DIR OPT OBS: CPT | Mod: QW,S$GLB,, | Performed by: EMERGENCY MEDICINE

## 2019-07-25 PROCEDURE — 80305 POCT RAPID DRUG SCREEN 10 PANEL: ICD-10-PCS | Mod: QW,S$GLB,, | Performed by: EMERGENCY MEDICINE

## 2019-08-14 ENCOUNTER — DOCUMENTATION ONLY (OUTPATIENT)
Dept: BARIATRICS | Facility: CLINIC | Age: 54
End: 2019-08-14

## 2019-09-13 DIAGNOSIS — E11.9 TYPE 2 DIABETES MELLITUS WITHOUT COMPLICATION, UNSPECIFIED WHETHER LONG TERM INSULIN USE: ICD-10-CM

## 2019-09-26 ENCOUNTER — TELEPHONE (OUTPATIENT)
Dept: PRIMARY CARE CLINIC | Facility: CLINIC | Age: 54
End: 2019-09-26

## 2019-10-23 ENCOUNTER — TELEPHONE (OUTPATIENT)
Dept: OPHTHALMOLOGY | Facility: CLINIC | Age: 54
End: 2019-10-23

## 2019-10-23 ENCOUNTER — TELEPHONE (OUTPATIENT)
Dept: INTERNAL MEDICINE | Facility: CLINIC | Age: 54
End: 2019-10-23

## 2019-10-23 NOTE — TELEPHONE ENCOUNTER
----- Message from Jamee Mcclure sent at 10/23/2019 10:12 AM CDT -----  Contact: self/[phone]  Type: Returning a call    Who left a message? Viky     When did the practice call? Today     Comments: Please advise.        Thank You

## 2019-11-06 ENCOUNTER — TELEPHONE (OUTPATIENT)
Dept: PRIMARY CARE CLINIC | Facility: CLINIC | Age: 54
End: 2019-11-06

## 2019-11-06 NOTE — TELEPHONE ENCOUNTER
Please follow-up with why the patient did not attend her appointment today. When can she come in?    Thanks,  KJ

## 2019-11-22 ENCOUNTER — OFFICE VISIT (OUTPATIENT)
Dept: OPTOMETRY | Facility: CLINIC | Age: 54
End: 2019-11-22
Payer: MEDICARE

## 2019-11-22 ENCOUNTER — PATIENT OUTREACH (OUTPATIENT)
Dept: ADMINISTRATIVE | Facility: OTHER | Age: 54
End: 2019-11-22

## 2019-11-22 DIAGNOSIS — H52.03 HYPEROPIA WITH ASTIGMATISM AND PRESBYOPIA, BILATERAL: ICD-10-CM

## 2019-11-22 DIAGNOSIS — H25.13 NUCLEAR SCLEROTIC CATARACT OF BOTH EYES: ICD-10-CM

## 2019-11-22 DIAGNOSIS — H52.4 HYPEROPIA WITH ASTIGMATISM AND PRESBYOPIA, BILATERAL: ICD-10-CM

## 2019-11-22 DIAGNOSIS — E11.9 TYPE 2 DIABETES MELLITUS WITHOUT RETINOPATHY: Primary | ICD-10-CM

## 2019-11-22 DIAGNOSIS — H52.203 HYPEROPIA WITH ASTIGMATISM AND PRESBYOPIA, BILATERAL: ICD-10-CM

## 2019-11-22 PROCEDURE — 99999 PR PBB SHADOW E&M-EST. PATIENT-LVL II: ICD-10-PCS | Mod: PBBFAC,,, | Performed by: OPTOMETRIST

## 2019-11-22 PROCEDURE — 92014 PR EYE EXAM, EST PATIENT,COMPREHESV: ICD-10-PCS | Mod: S$GLB,,, | Performed by: OPTOMETRIST

## 2019-11-22 PROCEDURE — 99999 PR PBB SHADOW E&M-EST. PATIENT-LVL II: CPT | Mod: PBBFAC,,, | Performed by: OPTOMETRIST

## 2019-11-22 PROCEDURE — 92014 COMPRE OPH EXAM EST PT 1/>: CPT | Mod: S$GLB,,, | Performed by: OPTOMETRIST

## 2019-11-22 PROCEDURE — 92015 DETERMINE REFRACTIVE STATE: CPT | Mod: S$GLB,,, | Performed by: OPTOMETRIST

## 2019-11-22 PROCEDURE — 92015 PR REFRACTION: ICD-10-PCS | Mod: S$GLB,,, | Performed by: OPTOMETRIST

## 2019-11-22 NOTE — PROGRESS NOTES
HPI     53 y/o female here for annual diabetic eye exam  DLS 8/28/18 Dr Martínez  Happy with current glasses   No flashes or floaters   Eyes water, and itch. No drops   No diplopia  S/p ex of lesions 7 years ago not at [facility]    Hemoglobin A1C       Date                     Value               Ref Range             Status                12/21/2018               5.6                 4.0 - 5.6 %           Final                  05/03/2018               5.7 (H)             4.0 - 5.6 %           Final                  01/19/2018               5.7 (H)             4.0 - 5.6 %           Final                    Last edited by Emilee Stallings, OD on 11/22/2019 12:26 PM. (History)            Assessment /Plan     For exam results, see Encounter Report.    Type 2 diabetes mellitus without retinopathy    Nuclear sclerotic cataract of both eyes    Hyperopia with astigmatism and presbyopia, bilateral      1. No retinopathy noted. Continue proper BS control and annual DM eye exams. Monitor yearly.   2. Not visually significant. No need for removal at this time. Monitor yearly.   3. Updated SRx. Minimal change from habitual, okay to continue with current PALs. Monitor yearly.     RTC in 1 year for annual eye exam or prn.

## 2019-11-29 ENCOUNTER — PES CALL (OUTPATIENT)
Dept: ADMINISTRATIVE | Facility: CLINIC | Age: 54
End: 2019-11-29

## 2019-12-03 ENCOUNTER — TELEPHONE (OUTPATIENT)
Dept: INTERNAL MEDICINE | Facility: CLINIC | Age: 54
End: 2019-12-03

## 2019-12-03 DIAGNOSIS — Z12.31 BREAST CANCER SCREENING BY MAMMOGRAM: Primary | ICD-10-CM

## 2019-12-03 NOTE — TELEPHONE ENCOUNTER
Mammo order signed, please schedule.    Why hasnt she attended any of her appointments with me for the past year?? Can she come see us in the new year? Is she ok??    Thanks  KJ

## 2019-12-03 NOTE — TELEPHONE ENCOUNTER
----- Message from Blanquita Irene sent at 12/3/2019  8:17 AM CST -----  Contact: [phone]  Patient requesting an order for mammogram . Please advise thanks

## 2019-12-10 ENCOUNTER — HOSPITAL ENCOUNTER (OUTPATIENT)
Dept: RADIOLOGY | Facility: HOSPITAL | Age: 54
Discharge: HOME | End: 2019-12-10
Attending: INTERNAL MEDICINE
Payer: MEDICARE

## 2019-12-10 VITALS — WEIGHT: 235.88 LBS | BODY MASS INDEX: 40.49 KG/M2

## 2019-12-10 DIAGNOSIS — Z12.31 BREAST CANCER SCREENING BY MAMMOGRAM: ICD-10-CM

## 2019-12-10 PROCEDURE — 77067 SCR MAMMO BI INCL CAD: CPT | Mod: 26,HCNC,, | Performed by: RADIOLOGY

## 2019-12-10 PROCEDURE — 77067 SCR MAMMO BI INCL CAD: CPT | Mod: TC,HCNC

## 2019-12-10 PROCEDURE — 77063 BREAST TOMOSYNTHESIS BI: CPT | Mod: 26,HCNC,, | Performed by: RADIOLOGY

## 2019-12-10 PROCEDURE — 77063 MAMMO DIGITAL SCREENING BILAT WITH TOMOSYNTHESIS_CAD: ICD-10-PCS | Mod: 26,HCNC,, | Performed by: RADIOLOGY

## 2019-12-10 PROCEDURE — 77067 MAMMO DIGITAL SCREENING BILAT WITH TOMOSYNTHESIS_CAD: ICD-10-PCS | Mod: 26,HCNC,, | Performed by: RADIOLOGY

## 2019-12-11 ENCOUNTER — TELEPHONE (OUTPATIENT)
Dept: INTERNAL MEDICINE | Facility: CLINIC | Age: 54
End: 2019-12-11

## 2019-12-11 NOTE — TELEPHONE ENCOUNTER
----- Message from Briana Carvajal sent at 12/11/2019  9:15 AM CST -----  Contact: Patient [phone]  Patient requesting a call to get an appt for next week.    Please call and advise.    Thank You

## 2019-12-12 ENCOUNTER — TELEPHONE (OUTPATIENT)
Dept: BARIATRICS | Facility: CLINIC | Age: 54
End: 2019-12-12

## 2019-12-12 NOTE — TELEPHONE ENCOUNTER
----- Message from Honey Rojas sent at 12/12/2019 10:02 AM CST -----  Contact: patient  Please call above patient at [phone] need to schedule an appointment thanks.

## 2019-12-16 ENCOUNTER — TELEPHONE (OUTPATIENT)
Dept: INTERNAL MEDICINE | Facility: CLINIC | Age: 54
End: 2019-12-16

## 2019-12-16 ENCOUNTER — PATIENT OUTREACH (OUTPATIENT)
Dept: ADMINISTRATIVE | Facility: OTHER | Age: 54
End: 2019-12-16

## 2019-12-16 NOTE — TELEPHONE ENCOUNTER
12/16/2019 Notified patient know that her mammogram was normal. The next is due in one year.     When is she coming in for an appointment? She is scheduled for 12/19/19 and told her about her 12/17 Bariatric appt.

## 2019-12-16 NOTE — TELEPHONE ENCOUNTER
----- Message from Sumi Corrigan MD sent at 12/16/2019 12:16 PM CST -----  Please let patient know that her mammogram was normal. The next is due in one year.    When is she coming in for an appointment? She needs follow-up.

## 2019-12-16 NOTE — TELEPHONE ENCOUNTER
----- Message from Bowen Romero sent at 12/16/2019  4:34 PM CST -----  Contact: Patient [phone]  Patient is returning a phone call.  Who left a message for the patient: Dr perez   Does patient know what this is regarding:  Test results  Comments:     Please call an advise  Thank you

## 2020-01-07 ENCOUNTER — PES CALL (OUTPATIENT)
Dept: ADMINISTRATIVE | Facility: CLINIC | Age: 55
End: 2020-01-07

## 2020-01-27 ENCOUNTER — HOSPITAL ENCOUNTER (EMERGENCY)
Facility: HOSPITAL | Age: 55
Discharge: HOME | End: 2020-01-27
Attending: SURGERY
Payer: MEDICARE

## 2020-01-27 ENCOUNTER — TELEPHONE (OUTPATIENT)
Dept: PRIMARY CARE CLINIC | Facility: CLINIC | Age: 55
End: 2020-01-27

## 2020-01-27 VITALS
SYSTOLIC BLOOD PRESSURE: 132 MMHG | DIASTOLIC BLOOD PRESSURE: 65 MMHG | HEART RATE: 76 BPM | BODY MASS INDEX: 41.57 KG/M2 | RESPIRATION RATE: 18 BRPM | TEMPERATURE: 98 F | WEIGHT: 242.19 LBS | OXYGEN SATURATION: 99 %

## 2020-01-27 DIAGNOSIS — M79.641 RIGHT HAND PAIN: Primary | ICD-10-CM

## 2020-01-27 PROCEDURE — 99284 EMERGENCY DEPT VISIT MOD MDM: CPT | Mod: 25,HCNC

## 2020-01-27 PROCEDURE — 96372 THER/PROPH/DIAG INJ SC/IM: CPT | Mod: HCNC

## 2020-01-27 PROCEDURE — 63600175 PHARM REV CODE 636 W HCPCS: Mod: HCNC | Performed by: SURGERY

## 2020-01-27 RX ORDER — IBUPROFEN 800 MG/1
800 TABLET ORAL EVERY 6 HOURS PRN
Qty: 20 TABLET | Refills: 0 | Status: SHIPPED | OUTPATIENT
Start: 2020-01-27

## 2020-01-27 RX ORDER — ONDANSETRON 2 MG/ML
4 INJECTION INTRAMUSCULAR; INTRAVENOUS
Status: COMPLETED | OUTPATIENT
Start: 2020-01-27 | End: 2020-01-27

## 2020-01-27 RX ORDER — MEPERIDINE HYDROCHLORIDE 25 MG/ML
25 INJECTION INTRAMUSCULAR; INTRAVENOUS; SUBCUTANEOUS
Status: COMPLETED | OUTPATIENT
Start: 2020-01-27 | End: 2020-01-27

## 2020-01-27 RX ORDER — CYCLOBENZAPRINE HCL 10 MG
10 TABLET ORAL 3 TIMES DAILY PRN
Qty: 10 TABLET | Refills: 0 | Status: SHIPPED | OUTPATIENT
Start: 2020-01-27 | End: 2020-02-01

## 2020-01-27 RX ADMIN — ONDANSETRON 4 MG: 2 INJECTION INTRAMUSCULAR; INTRAVENOUS at 12:01

## 2020-01-27 RX ADMIN — MEPERIDINE HYDROCHLORIDE 25 MG: 25 INJECTION INTRAMUSCULAR; INTRAVENOUS; SUBCUTANEOUS at 12:01

## 2020-01-27 NOTE — TELEPHONE ENCOUNTER
Please call patient, was seen in ED today. Does she need appt? She has no-showed for almost a year. She needs to come in!    How is she doing?    Thanks,  KJ

## 2020-01-27 NOTE — ED PROVIDER NOTES
[facility]                                                 Chief Complaint  54 y.o. female with Hand Pain (Right hand pain X 5 days)    History of Present Illness  Dai Roper presents to the emergency room with carpal tunnel issues  Patient has had on again off again right hand pain for last 5 days with activity  Patient states the the exact symptoms she had with carpal tunnel five years ago  Patient denies any trauma or fall, patient denies any recent right hand injury  Patient has good range of motion and normal strength in all digits this morning    The history is provided by the patient   device was not used during this ER visit    Past Medical History   -- Anxiety    -- Asthma    -- Depression    -- Diabetes mellitus    -- DVT (deep venous thrombosis)    -- Hx of psychiatric care    -- Hypertension    -- Psychiatric problem    -- Sleep difficulties      Surgeries: Breast biopsy, carpal tunnel, hernia repair, BTL  Allergies: Vitamin-D analog    I have reviewed all of this patient's past medical, surgical, family, and social   histories as well as active allergies and medications documented in the  electronic medical record    Review of Systems and Physical Exam      Review of Systems  -- Constitution - no fever, denies fatigue, no weakness, no chills  -- Eyes - no tearing or redness, no visual disturbance  -- Ear, Nose - no tinnitus or earache, no nasal congestion or discharge  -- Mouth,Throat - no sore throat, no toothache, normal voice, normal swallowing  -- Respiratory - denies cough and congestion, no shortness of breath, no CURRAN  -- Cardiovascular - denies chest pain, no palpitations, denies claudication  -- Gastrointestinal - denies abdominal pain, nausea, vomiting, or diarrhea  -- Genitourinary - no dysuria, denies flank pain, no hematuria, no STD risk  -- Musculoskeletal - right hand pain  -- Neurological - no headache, denies weakness or seizure; no  LOC  -- Skin - denies pallor, rash, or changes in skin. no hives or welts noted    Vital Signs  Her oral temperature is 97.6 °F (36.4 °C).   Her blood pressure is 132/65 and her pulse is 76.   Her respiration is 18 and oxygen saturation is 99%.     Physical Exam  -- Nursing note and vitals reviewed  -- Constitutional: Appears well-developed and well-nourished  -- Head: Atraumatic. Normocephalic. No obvious abnormality  -- Eyes: Pupils are equal and reactive to light. Normal conjunctiva and lids  -- Cardiac: Normal rate, regular rhythm and normal heart sounds  -- Pulmonary: Normal respiratory effort, breath sounds clear to auscultation  -- Abdominal: Soft, no tenderness. Normal bowel sounds. Normal liver edge  -- Musculoskeletal: Normal range of motion, no effusions. Joints stable   -- Neurological: No focal deficits. Showed good interaction with staff  -- Vascular: Posterior tibial, dorsalis pedis and radial pulses 2+ bilaterally      Emergency Room Course      Treatment and Evaluation  -- Preliminary ER x-ray readings showed no evidence of fracture or dislocation  -- All x-rays are reviewed with a final disposition given by the radiologist  -- IM 25 mg Demerol given in the ER  -- IM 4 mg Zofran given today in the ER     Diagnosis  -- The encounter diagnosis was Right hand pain.    Disposition and Plan  -- Disposition: home  -- Condition: stable  -- Follow-up: Patient to follow up with Sumi Corrigan MD in 1-2 days.  -- I advised the patient that we have found no life threatening condition today  -- At this time, I believe the patient is clinically stable for discharge.   -- The patient acknowledges that close follow up with a MD is required   -- Patient agrees to comply with all instruction and direction given in the ER    This note is dictated on M*Modal word recognition program.  There are word recognition mistakes that are occasionally missed on review.         Kirk Morales MD  01/27/20 5595

## 2020-01-27 NOTE — ED TRIAGE NOTES
54 y.o. female presents to ER Room/bed info not found   Chief Complaint   Patient presents with    Hand Pain     Right hand pain X 5 days   . No acute distress noted.

## 2020-02-10 ENCOUNTER — TELEPHONE (OUTPATIENT)
Dept: ORTHOPEDICS | Facility: CLINIC | Age: 55
End: 2020-02-10

## 2020-02-10 DIAGNOSIS — M25.531 RIGHT WRIST PAIN: Primary | ICD-10-CM

## 2020-02-11 ENCOUNTER — PATIENT OUTREACH (OUTPATIENT)
Dept: ADMINISTRATIVE | Facility: OTHER | Age: 55
End: 2020-02-11

## 2020-02-11 NOTE — PROGRESS NOTES
Chart reviewed.   Immunizations: Triggered Imm Registry     Orders placed: n/a  Upcoming appts to satisfy CHRIS topics: n/a

## 2020-02-12 ENCOUNTER — OFFICE VISIT (OUTPATIENT)
Dept: ORTHOPEDICS | Facility: CLINIC | Age: 55
End: 2020-02-12
Payer: MEDICARE

## 2020-02-12 ENCOUNTER — HOSPITAL ENCOUNTER (OUTPATIENT)
Dept: RADIOLOGY | Facility: OTHER | Age: 55
Discharge: HOME | End: 2020-02-12
Attending: PLASTIC SURGERY
Payer: MEDICARE

## 2020-02-12 VITALS
HEART RATE: 105 BPM | DIASTOLIC BLOOD PRESSURE: 85 MMHG | SYSTOLIC BLOOD PRESSURE: 132 MMHG | HEIGHT: 64 IN | WEIGHT: 242 LBS | BODY MASS INDEX: 41.32 KG/M2

## 2020-02-12 DIAGNOSIS — M65.4 DE QUERVAIN'S TENOSYNOVITIS, RIGHT: Primary | ICD-10-CM

## 2020-02-12 DIAGNOSIS — M25.531 RIGHT WRIST PAIN: ICD-10-CM

## 2020-02-12 PROCEDURE — 99999 PR PBB SHADOW E&M-EST. PATIENT-LVL III: ICD-10-PCS | Mod: PBBFAC,HCNC,, | Performed by: PLASTIC SURGERY

## 2020-02-12 PROCEDURE — 3079F DIAST BP 80-89 MM HG: CPT | Mod: HCNC,CPTII,S$GLB, | Performed by: PLASTIC SURGERY

## 2020-02-12 PROCEDURE — 3075F SYST BP GE 130 - 139MM HG: CPT | Mod: HCNC,CPTII,S$GLB, | Performed by: PLASTIC SURGERY

## 2020-02-12 PROCEDURE — 20550 PR INJECT TENDON SHEATH/LIGAMENT: ICD-10-PCS | Mod: HCNC,RT,S$GLB, | Performed by: PLASTIC SURGERY

## 2020-02-12 PROCEDURE — 73100 X-RAY EXAM OF WRIST: CPT | Mod: TC,HCNC,FY,RT

## 2020-02-12 PROCEDURE — 3079F PR MOST RECENT DIASTOLIC BLOOD PRESSURE 80-89 MM HG: ICD-10-PCS | Mod: HCNC,CPTII,S$GLB, | Performed by: PLASTIC SURGERY

## 2020-02-12 PROCEDURE — 20550 NJX 1 TENDON SHEATH/LIGAMENT: CPT | Mod: HCNC,RT,S$GLB, | Performed by: PLASTIC SURGERY

## 2020-02-12 PROCEDURE — 99203 OFFICE O/P NEW LOW 30 MIN: CPT | Mod: 25,HCNC,S$GLB, | Performed by: PLASTIC SURGERY

## 2020-02-12 PROCEDURE — 99999 PR PBB SHADOW E&M-EST. PATIENT-LVL III: CPT | Mod: PBBFAC,HCNC,, | Performed by: PLASTIC SURGERY

## 2020-02-12 PROCEDURE — 73100 X-RAY EXAM OF WRIST: CPT | Mod: 26,HCNC,RT, | Performed by: INTERNAL MEDICINE

## 2020-02-12 PROCEDURE — 3008F BODY MASS INDEX DOCD: CPT | Mod: HCNC,CPTII,S$GLB, | Performed by: PLASTIC SURGERY

## 2020-02-12 PROCEDURE — 3075F PR MOST RECENT SYSTOLIC BLOOD PRESS GE 130-139MM HG: ICD-10-PCS | Mod: HCNC,CPTII,S$GLB, | Performed by: PLASTIC SURGERY

## 2020-02-12 PROCEDURE — 73100 XR WRIST 2 VIEW RIGHT: ICD-10-PCS | Mod: 26,HCNC,RT, | Performed by: INTERNAL MEDICINE

## 2020-02-12 PROCEDURE — 99203 PR OFFICE/OUTPT VISIT, NEW, LEVL III, 30-44 MIN: ICD-10-PCS | Mod: 25,HCNC,S$GLB, | Performed by: PLASTIC SURGERY

## 2020-02-12 PROCEDURE — 3008F PR BODY MASS INDEX (BMI) DOCUMENTED: ICD-10-PCS | Mod: HCNC,CPTII,S$GLB, | Performed by: PLASTIC SURGERY

## 2020-02-12 RX ORDER — TRIAMCINOLONE ACETONIDE 40 MG/ML
40 INJECTION, SUSPENSION INTRA-ARTICULAR; INTRAMUSCULAR
Status: COMPLETED | OUTPATIENT
Start: 2020-02-12 | End: 2020-02-12

## 2020-02-12 RX ADMIN — TRIAMCINOLONE ACETONIDE 40 MG: 40 INJECTION, SUSPENSION INTRA-ARTICULAR; INTRAMUSCULAR at 10:02

## 2020-02-12 NOTE — PROGRESS NOTES
Chief Complaint: Pain of the Right Hand      HPI:    Dai Roper is a right hand dominant 54 y.o. female presenting today for 2 week history of right radial sided wrist pain..There was not a history of trauma.  Onset of symptoms began 2 weeks ago.The pain is sharp and located over the radial styloid.  She states that the symptoms cause her to drop objects while gripping.  She also complains of occasional swelling over the radial styloid.  She has a history of bilateral carpal tunnel releases approximately 6 years ago.  She denies any other complaints today    Past Medical History:   Diagnosis Date    Anxiety     Asthma     Depression     Diabetes mellitus     DVT (deep venous thrombosis)     Hx of psychiatric care     Hypertension     Psychiatric problem     Sleep difficulties        Past Surgical History:   Procedure Laterality Date    BREAST BIOPSY Left 12/18/2018    CARPAL TUNNEL RELEASE      HERNIA REPAIR      TUBAL LIGATION          Family History   Problem Relation Age of Onset    Diabetes Mother     Heart disease Mother     Hypertension Mother     Lupus Mother     Depression Mother     Anxiety disorder Mother     Heart disease Father     Hypertension Father     Hypertension Sister     Lupus Sister     No Known Problems Brother     Anxiety disorder Daughter     Depression Daughter     Depression Son     Anxiety disorder Son     Hypertension Sister     Hypertension Sister     Breast cancer Maternal Aunt        Social History     Socioeconomic History    Marital status: Single     Spouse name: Not on file    Number of children: Not on file    Years of education: Not on file    Highest education level: Not on file   Occupational History    Occupation: disabled   Social Needs    Financial resource strain: Somewhat hard    Food insecurity:     Worry: Often true     Inability: Sometimes true    Transportation needs:     Medical: No     Non-medical: No   Tobacco Use     Smoking status: Never Smoker    Smokeless tobacco: Never Used   Substance and Sexual Activity    Alcohol use: No    Drug use: No    Sexual activity: Not Currently   Lifestyle    Physical activity:     Days per week: Not on file     Minutes per session: Not on file    Stress: Not on file   Relationships    Social connections:     Talks on phone: Not on file     Gets together: Not on file     Attends Denominational service: Not on file     Active member of club or organization: Not on file     Attends meetings of clubs or organizations: Not on file     Relationship status: Not on file   Other Topics Concern    Patient feels they ought to cut down on drinking/drug use Not Asked    Patient annoyed by others criticizing their drinking/drug use Not Asked    Patient has felt bad or guilty about drinking/drug use Not Asked    Patient has had a drink/used drugs as an eye opener in the AM Not Asked   Social History Narrative    Not on file       Review of patient's allergies indicates:   Allergen Reactions    Vitamin d analogue Edema     NOT allergy, but reaction to high dose vitamin D         Current Outpatient Medications:     blood sugar diagnostic (ACCU-CHEK BAKARI) Strp, 1 strip by Misc.(Non-Drug; Combo Route) route once daily., Disp: 200 each, Rfl: 11    cholecalciferol, vitamin D3, (VITAMIN D3) 5,000 unit Tab, Take 5,000 Units by mouth once daily., Disp: 90 tablet, Rfl: 3    cholecalciferol, vitamin D3, 5,000 unit Tab, TAKE ONE TABLET BY MOUTH EVERY DAY, Disp: 90 tablet, Rfl: 3    diphenhydrAMINE (BENADRYL) 25 mg capsule, Take 1 each (25 mg total) by mouth every 6 (six) hours as needed for Itching or Allergies., Disp: 20 capsule, Rfl: 0    DULoxetine (CYMBALTA) 30 MG capsule, Take 1 capsule (30 mg total) by mouth once daily., Disp: 90 capsule, Rfl: 3    fluticasone (FLOVENT HFA) 220 mcg/actuation inhaler, Inhale 1 puff into the lungs 2 (two) times daily., Disp: , Rfl:     gabapentin (NEURONTIN) 300 MG  capsule, Take 1 capsule (300 mg total) by mouth 3 (three) times daily., Disp: 90 capsule, Rfl: 3    ibuprofen (ADVIL,MOTRIN) 800 MG tablet, Take 1 tablet (800 mg total) by mouth every 6 (six) hours as needed for Pain., Disp: 20 tablet, Rfl: 0    ketoconazole (NIZORAL) 2 % cream, Apply topically once daily., Disp: 60 g, Rfl: 5    ondansetron (ZOFRAN-ODT) 4 MG TbDL, Take 1 tablet (4 mg total) by mouth every 6 (six) hours as needed., Disp: 15 tablet, Rfl: 0    albuterol (PROVENTIL) 2.5 mg /3 mL (0.083 %) nebulizer solution, Take 3 mLs (2.5 mg total) by nebulization every 6 (six) hours as needed for Wheezing., Disp: 1 Box, Rfl: 0    aspirin 81 MG Chew, Take 1 tablet (81 mg total) by mouth once daily., Disp: 90 tablet, Rfl: 3    atorvastatin (LIPITOR) 40 MG tablet, Take 1 tablet (40 mg total) by mouth once daily., Disp: 90 tablet, Rfl: 3    blood-glucose meter (ACCU-CHEK BAKARI CONNECT METER) Misc, 1 Units by Misc.(Non-Drug; Combo Route) route once daily., Disp: 1 each, Rfl: 0    cetirizine (ZYRTEC) 10 MG tablet, Take 1 tablet (10 mg total) by mouth once daily., Disp: 30 tablet, Rfl: 0    metFORMIN (GLUCOPHAGE-XR) 500 MG 24 hr tablet, Take 1 tablet (500 mg total) by mouth 2 (two) times daily with meals., Disp: 180 tablet, Rfl: 3    topiramate (TOPAMAX) 50 MG tablet, TAKE ONE TABLET BY MOUTH TWICE A DAY, Disp: 180 tablet, Rfl: 3        Review of Systems:  Constitutional: no fever or chills  ENT: no nasal congestion or sore throat  Respiratory: no cough or shortness of breath  Cardiovascular: no chest pain or palpitations  Gastrointestinal: no nausea or vomiting, PUD, GERD, NSAID intolerance  Genitourinary: no hematuria or dysuria  Integument/Breast: no rash or pruritis  Hematologic/Lymphatic: no easy bruising or lymphadenopathy  Musculoskeletal: see HPI  Neurological: no seizures or tremors  Behavioral/Psych: no auditory or visual hallucinations      Objective:      PHYSICAL EXAM:  Vitals:    02/12/20 0920   BP:  "132/85   Pulse: 105   Weight: 109.8 kg (242 lb)   Height: 5' 4" (1.626 m)   PainSc:   8     General Appearance: WDWN, NAD  Neuro/Psych: Mood & affect appropriate  Lungs: Respirations equal and unlabored.   CV: No apparent CV dysfunction, distal pulses intact, RRR  Skin: Intact throughout  Extremities:   Right Hand Exam     Tenderness   The patient is experiencing tenderness in the radial area (Tenderness to palpation over the 1st dorsal compartment).    Range of Motion   The patient has normal right wrist ROM.     Tests   Finkelstein's test: positive    Other   Scars: present  Sensation: normal  Pulse: present              DIAGNOSTICS/IMAGING:    Radiologist's Impression:     EXAMINATION:  XR HAND COMPLETE 3 VIEW RIGHT    CLINICAL HISTORY:  right hand pain;    TECHNIQUE:  PA, lateral, and oblique views of the right hand were performed.    COMPARISON:  None    FINDINGS:  Bone mineralization is within normal limits.  Scapholunate interval is at the upper limits of normal.  Clinically correlation for pain in this location for the possibility of underlying scapholunate ligament disruption.  The hook of the hamate is slightly distally position.  This could be simply related to the patient's normal anatomy however underlying fracture with displacement is not excluded.  Evaluation for pain in this location is recommended.  If there is pain in this region, consider a tunnel view of the right wrist for further evaluation.  Remaining joint spaces are within normal limits.    Comments: I have personnaly reviewed the imaging and I disagree with the radiologist's report the SL space is within normal limits          Assessment:     Encounter Diagnosis   Name Primary?    De Quervain's tenosynovitis, right Yes            Plan/Discussion:   Dai was seen today for pain.    Diagnoses and all orders for this visit:    De Quervain's tenosynovitis, right      Discussed the pathophysiology progression treatment of de Quervain " tenosynovitis with the patient detail. She was offered a corticosteroid injection to the tendon sheath of the 1st dorsal compartment to help alleviate her symptoms. She wished to receive this injection please see the procedure note above. I discussed that if her symptoms fail improve worsen she may return to clinic in 8 weeks otherwise she may follow up p.r.n.

## 2020-02-17 ENCOUNTER — OCCUPATIONAL HEALTH (OUTPATIENT)
Dept: URGENT CARE | Facility: CLINIC | Age: 55
End: 2020-02-17

## 2020-02-17 DIAGNOSIS — Z02.83 ENCOUNTER FOR DRUG SCREENING: Primary | ICD-10-CM

## 2020-02-17 DIAGNOSIS — Z11.1 PPD SCREENING TEST: ICD-10-CM

## 2020-02-17 PROCEDURE — 80305 OOH NON-DOT DRUG SCREEN: ICD-10-PCS | Mod: S$GLB,,, | Performed by: PHYSICIAN ASSISTANT

## 2020-02-17 PROCEDURE — 86580 POCT TB SKIN TEST: ICD-10-PCS | Mod: S$GLB,,, | Performed by: PHYSICIAN ASSISTANT

## 2020-02-17 PROCEDURE — 86580 TB INTRADERMAL TEST: CPT | Mod: S$GLB,,, | Performed by: PHYSICIAN ASSISTANT

## 2020-02-17 PROCEDURE — 80305 DRUG TEST PRSMV DIR OPT OBS: CPT | Mod: S$GLB,,, | Performed by: PHYSICIAN ASSISTANT

## 2020-02-19 LAB
TB INDURATION - 48 HR READ: 0 MM
TB INDURATION - 72 HR READ: NORMAL
TB SKIN TEST - 48 HR READ: NEGATIVE
TB SKIN TEST - 72 HR READ: NORMAL

## 2020-02-20 ENCOUNTER — PATIENT MESSAGE (OUTPATIENT)
Dept: ADMINISTRATIVE | Facility: HOSPITAL | Age: 55
End: 2020-02-20

## 2020-02-20 ENCOUNTER — PATIENT OUTREACH (OUTPATIENT)
Dept: ADMINISTRATIVE | Facility: HOSPITAL | Age: 55
End: 2020-02-20

## 2020-02-20 NOTE — PROGRESS NOTES
Chart Reviewed - Vaccines Updated - Portal message sent of need to call our office to schedule her PAP and Foot Exam.

## 2020-02-26 ENCOUNTER — PATIENT MESSAGE (OUTPATIENT)
Dept: BARIATRICS | Facility: CLINIC | Age: 55
End: 2020-02-26

## 2020-02-28 ENCOUNTER — PATIENT MESSAGE (OUTPATIENT)
Dept: ADMINISTRATIVE | Facility: HOSPITAL | Age: 55
End: 2020-02-28

## 2020-02-28 ENCOUNTER — PATIENT OUTREACH (OUTPATIENT)
Dept: ADMINISTRATIVE | Facility: HOSPITAL | Age: 55
End: 2020-02-28

## 2020-02-28 NOTE — PROGRESS NOTES
Chart Reviewed - Vaccines Updated - Portal message sent to call our office to schedule her F/U with PCP , Fasting Lab and Podiatry appointments. She was also reminded to collect and mail her Fit Kit due.She was instructed to bring her glucose readings and monitor to her visit.

## 2020-04-17 ENCOUNTER — TELEPHONE (OUTPATIENT)
Dept: PRIMARY CARE CLINIC | Facility: CLINIC | Age: 55
End: 2020-04-17

## 2020-04-17 NOTE — TELEPHONE ENCOUNTER
----- Message from Ivory Townsend sent at 4/17/2020  8:42 AM CDT -----  Contact: Ivory Townsend   Please reschedule this patient for a virtual visit. She will be trained in telemed this afternoon. Thank you!

## 2020-05-07 DIAGNOSIS — E11.69 HYPERLIPIDEMIA ASSOCIATED WITH TYPE 2 DIABETES MELLITUS: ICD-10-CM

## 2020-05-07 DIAGNOSIS — E66.01 MORBID OBESITY DUE TO EXCESS CALORIES: ICD-10-CM

## 2020-05-07 DIAGNOSIS — E11.59 HYPERTENSION ASSOCIATED WITH DIABETES: ICD-10-CM

## 2020-05-07 DIAGNOSIS — E11.8 TYPE 2 DIABETES MELLITUS WITH COMPLICATION, WITHOUT LONG-TERM CURRENT USE OF INSULIN: ICD-10-CM

## 2020-05-07 DIAGNOSIS — E78.5 HYPERLIPIDEMIA ASSOCIATED WITH TYPE 2 DIABETES MELLITUS: ICD-10-CM

## 2020-05-07 DIAGNOSIS — I15.2 HYPERTENSION ASSOCIATED WITH DIABETES: ICD-10-CM

## 2020-05-07 RX ORDER — KETOCONAZOLE 20 MG/G
CREAM TOPICAL DAILY
Qty: 60 G | Refills: 5 | Status: CANCELLED | OUTPATIENT
Start: 2020-05-07

## 2020-05-08 ENCOUNTER — TELEPHONE (OUTPATIENT)
Dept: PODIATRY | Facility: CLINIC | Age: 55
End: 2020-05-08

## 2020-05-08 RX ORDER — TOPIRAMATE 50 MG/1
50 TABLET, FILM COATED ORAL 2 TIMES DAILY
Qty: 180 TABLET | Refills: 3 | Status: SHIPPED | OUTPATIENT
Start: 2020-05-08 | End: 2020-12-28 | Stop reason: SDUPTHER

## 2020-05-08 RX ORDER — KETOCONAZOLE 20 MG/G
CREAM TOPICAL DAILY
Qty: 60 G | Refills: 5 | OUTPATIENT
Start: 2020-05-08

## 2020-05-08 RX ORDER — METFORMIN HYDROCHLORIDE 500 MG/1
500 TABLET, EXTENDED RELEASE ORAL 2 TIMES DAILY WITH MEALS
Qty: 180 TABLET | Refills: 3 | Status: SHIPPED | OUTPATIENT
Start: 2020-05-08 | End: 2020-12-28 | Stop reason: SDUPTHER

## 2020-05-08 RX ORDER — NAPROXEN SODIUM 220 MG/1
81 TABLET, FILM COATED ORAL DAILY
Qty: 90 TABLET | Refills: 3 | Status: SHIPPED | OUTPATIENT
Start: 2020-05-08 | End: 2023-06-13

## 2020-05-08 NOTE — TELEPHONE ENCOUNTER
Spoke with patient, requesting an appt for refill of ketoconazole.  Has not been seen since 5/2018.  Appt made.

## 2020-05-12 ENCOUNTER — TELEPHONE (OUTPATIENT)
Dept: PRIMARY CARE CLINIC | Facility: CLINIC | Age: 55
End: 2020-05-12

## 2020-05-12 NOTE — TELEPHONE ENCOUNTER
D/C from Dr Corrigan's clinic form submitted to [facility] leadership.    Patient is not participating in her care, and hangs up on clinic staff when reaching out for appointments.    Sumi Corrigan MD/MPH  Internal Medicine  [facility]  [phone] [facility]

## 2020-05-14 ENCOUNTER — TELEPHONE (OUTPATIENT)
Dept: OPTOMETRY | Facility: CLINIC | Age: 55
End: 2020-05-14

## 2020-05-14 NOTE — TELEPHONE ENCOUNTER
Talked to pt which stated that she has trouble seeing clear when looking through the near segment of her new glasses. She would like to verify her prescription.  We will try to see pt asap.

## 2020-06-08 DIAGNOSIS — E66.01 MORBID OBESITY DUE TO EXCESS CALORIES: ICD-10-CM

## 2020-06-08 DIAGNOSIS — I15.2 HYPERTENSION ASSOCIATED WITH DIABETES: ICD-10-CM

## 2020-06-08 DIAGNOSIS — E11.69 HYPERLIPIDEMIA ASSOCIATED WITH TYPE 2 DIABETES MELLITUS: ICD-10-CM

## 2020-06-08 DIAGNOSIS — E11.59 HYPERTENSION ASSOCIATED WITH DIABETES: ICD-10-CM

## 2020-06-08 DIAGNOSIS — E78.5 HYPERLIPIDEMIA ASSOCIATED WITH TYPE 2 DIABETES MELLITUS: ICD-10-CM

## 2020-06-08 DIAGNOSIS — E11.8 TYPE 2 DIABETES MELLITUS WITH COMPLICATION, WITHOUT LONG-TERM CURRENT USE OF INSULIN: ICD-10-CM

## 2020-06-08 RX ORDER — METFORMIN HYDROCHLORIDE 500 MG/1
500 TABLET, EXTENDED RELEASE ORAL 2 TIMES DAILY WITH MEALS
Qty: 180 TABLET | Refills: 3 | Status: CANCELLED | OUTPATIENT
Start: 2020-06-08 | End: 2021-06-08

## 2020-06-08 RX ORDER — ATORVASTATIN CALCIUM 40 MG/1
40 TABLET, FILM COATED ORAL DAILY
Qty: 90 TABLET | Refills: 3 | Status: CANCELLED | OUTPATIENT
Start: 2020-06-08 | End: 2021-06-08

## 2020-06-08 RX ORDER — IBUPROFEN 800 MG/1
800 TABLET ORAL EVERY 6 HOURS PRN
Qty: 20 TABLET | Refills: 0 | Status: CANCELLED | OUTPATIENT
Start: 2020-06-08

## 2020-06-08 NOTE — TELEPHONE ENCOUNTER
Pt was expecting a visit over the phone and no one called her.     Pt still needs a doctor.     Please advise. Pt needs to be established.

## 2020-06-09 ENCOUNTER — OFFICE VISIT (OUTPATIENT)
Dept: OPTOMETRY | Facility: CLINIC | Age: 55
End: 2020-06-09
Payer: COMMERCIAL

## 2020-06-09 DIAGNOSIS — H52.4 HYPEROPIA WITH ASTIGMATISM AND PRESBYOPIA, BILATERAL: Primary | ICD-10-CM

## 2020-06-09 DIAGNOSIS — H52.203 HYPEROPIA WITH ASTIGMATISM AND PRESBYOPIA, BILATERAL: Primary | ICD-10-CM

## 2020-06-09 DIAGNOSIS — H52.03 HYPEROPIA WITH ASTIGMATISM AND PRESBYOPIA, BILATERAL: Primary | ICD-10-CM

## 2020-06-09 PROCEDURE — 99499 NO LOS: ICD-10-PCS | Mod: S$GLB,,, | Performed by: OPTOMETRIST

## 2020-06-09 PROCEDURE — 99999 PR PBB SHADOW E&M-EST. PATIENT-LVL II: ICD-10-PCS | Mod: PBBFAC,,, | Performed by: OPTOMETRIST

## 2020-06-09 PROCEDURE — 99499 UNLISTED E&M SERVICE: CPT | Mod: S$GLB,,, | Performed by: OPTOMETRIST

## 2020-06-09 PROCEDURE — 99999 PR PBB SHADOW E&M-EST. PATIENT-LVL II: CPT | Mod: PBBFAC,,, | Performed by: OPTOMETRIST

## 2020-06-09 NOTE — PROGRESS NOTES
ANNUAL VISIT NOTE     PRESENTING HISTORY     Reason for Visit:  Annual visit.    No chief complaint on file.      History of Present Illness & ROS: Ms. Dai Roper is a 54 y.o. female.    Review of Systems:  Eyes: denies visual changes at this time denies floaters   ENT: no nasal congestion or sore throat  Respiratory: no cough or shorness of breath  Cardiovascular: no chest pain or palpitations  Gastrointestinal: no nausea or vomiting, no abdominal pain or change in bowel habits  Genitourinary: no hematuria or dysuria; denies frequency  Hematologic/Lymphatic: no easy bruising or lymphadenopathy  Musculoskeletal: no arthralgias or myalgias  Neurological: no seizures or tremors  Endocrine: no heat or cold intolerance    PAST HISTORY:     Past Medical History:   Diagnosis Date    Anxiety     Asthma     Depression     Diabetes mellitus     DVT (deep venous thrombosis)     Hx of psychiatric care     Hypertension     Psychiatric problem     Sleep difficulties        Past Surgical History:   Procedure Laterality Date    BREAST BIOPSY Left 12/18/2018    CARPAL TUNNEL RELEASE      HERNIA REPAIR      TUBAL LIGATION         Family History   Problem Relation Age of Onset    Diabetes Mother     Heart disease Mother     Hypertension Mother     Lupus Mother     Depression Mother     Anxiety disorder Mother     Heart disease Father     Hypertension Father     Hypertension Sister     Lupus Sister     No Known Problems Brother     Anxiety disorder Daughter     Depression Daughter     Depression Son     Anxiety disorder Son     Hypertension Sister     Hypertension Sister     Breast cancer Maternal Aunt        Social History     Socioeconomic History    Marital status: Single     Spouse name: Not on file    Number of children: Not on file    Years of education: Not on file    Highest education level: Not on file   Occupational History    Occupation: disabled   Social Needs    Financial  resource strain: Somewhat hard    Food insecurity:     Worry: Often true     Inability: Sometimes true    Transportation needs:     Medical: No     Non-medical: No   Tobacco Use    Smoking status: Never Smoker    Smokeless tobacco: Never Used   Substance and Sexual Activity    Alcohol use: No    Drug use: No    Sexual activity: Not Currently   Lifestyle    Physical activity:     Days per week: Not on file     Minutes per session: Not on file    Stress: Not on file   Relationships    Social connections:     Talks on phone: Not on file     Gets together: Not on file     Attends Zoroastrianism service: Not on file     Active member of club or organization: Not on file     Attends meetings of clubs or organizations: Not on file     Relationship status: Not on file   Other Topics Concern    Patient feels they ought to cut down on drinking/drug use Not Asked    Patient annoyed by others criticizing their drinking/drug use Not Asked    Patient has felt bad or guilty about drinking/drug use Not Asked    Patient has had a drink/used drugs as an eye opener in the AM Not Asked   Social History Narrative    Not on file       MEDICATIONS & ALLERGIES:     Current Outpatient Medications on File Prior to Visit   Medication Sig Dispense Refill    albuterol (PROVENTIL) 2.5 mg /3 mL (0.083 %) nebulizer solution Take 3 mLs (2.5 mg total) by nebulization every 6 (six) hours as needed for Wheezing. 1 Box 0    aspirin 81 MG Chew Take 1 tablet (81 mg total) by mouth once daily. 90 tablet 3    atorvastatin (LIPITOR) 40 MG tablet Take 1 tablet (40 mg total) by mouth once daily. 90 tablet 3    blood sugar diagnostic (ACCU-CHEK BAKARI) Strp 1 strip by Misc.(Non-Drug; Combo Route) route once daily. 200 each 11    blood-glucose meter (ACCU-CHEK BAKARI CONNECT METER) Misc 1 Units by Misc.(Non-Drug; Combo Route) route once daily. 1 each 0    cetirizine (ZYRTEC) 10 MG tablet Take 1 tablet (10 mg total) by mouth once daily. 30 tablet 0     cholecalciferol, vitamin D3, (VITAMIN D3) 5,000 unit Tab Take 5,000 Units by mouth once daily. 90 tablet 3    diphenhydrAMINE (BENADRYL) 25 mg capsule Take 1 each (25 mg total) by mouth every 6 (six) hours as needed for Itching or Allergies. (Patient not taking: Reported on 6/9/2020) 20 capsule 0    DULoxetine (CYMBALTA) 30 MG capsule Take 1 capsule (30 mg total) by mouth once daily. 90 capsule 3    fluticasone (FLOVENT HFA) 220 mcg/actuation inhaler Inhale 1 puff into the lungs 2 (two) times daily.      gabapentin (NEURONTIN) 300 MG capsule Take 1 capsule (300 mg total) by mouth 3 (three) times daily. 90 capsule 3    ibuprofen (ADVIL,MOTRIN) 800 MG tablet Take 1 tablet (800 mg total) by mouth every 6 (six) hours as needed for Pain. (Patient not taking: Reported on 6/9/2020) 20 tablet 0    ketoconazole (NIZORAL) 2 % cream Apply topically once daily. 60 g 5    metFORMIN (GLUCOPHAGE-XR) 500 MG XR 24hr tablet Take 1 tablet (500 mg total) by mouth 2 (two) times daily with meals. 180 tablet 3    ondansetron (ZOFRAN-ODT) 4 MG TbDL Take 1 tablet (4 mg total) by mouth every 6 (six) hours as needed. 15 tablet 0    topiramate (TOPAMAX) 50 MG tablet TAKE ONE TABLET BY MOUTH TWICE A  tablet 3     No current facility-administered medications on file prior to visit.         Review of patient's allergies indicates:   Allergen Reactions    Vitamin d analogue Edema     NOT allergy, but reaction to high dose vitamin D       Medications Reconciliation:   I have reconciled the patient's home medications and discharge medications with the patient/family. I have updated all changes.  Refer to After-Visit Medication List.    OBJECTIVE:     Vital Signs:  There were no vitals filed for this visit.  Wt Readings from Last 1 Encounters:   02/12/20 0920 109.8 kg (242 lb)     There is no height or weight on file to calculate BMI.     Physical Exam:  General: Well developed, well nourished. No distress.  HEENT: Head is  normocephalic, atraumatic; ears are normal.   Eyes: Clear conjunctiva.  Neck: Supple, symmetrical neck; trachea midline.  Lungs: Clear to auscultation bilaterally and normal respiratory effort.  Cardiovascular: Heart with regular rate and rhythm. No murmurs, gallops or rubs  Extremities: No LE edema. Pulses 2+ and symmetric.   Abdomen: Abdomen is soft, non-tender non-distended with normal bowel sounds.  Skin: Skin color, texture, turgor normal. No rashes.  Musculoskeletal: Normal gait.   Lymph Nodes: No cervical or supraclavicular adenopathy.  Neurologic: Normal strength and tone. No focal numbness or weakness.   Psychiatric: Not depressed.    Laboratory  Lab Results   Component Value Date    WBC 5.89 05/30/2019    HGB 12.3 05/30/2019    HCT 37.9 05/30/2019     05/30/2019    CHOL 251 (H) 12/21/2018    TRIG 107 12/21/2018    HDL 74 12/21/2018    ALT 13 05/30/2019    AST 13 05/30/2019     05/30/2019    K 3.7 05/30/2019     05/30/2019    CREATININE 0.7 05/30/2019    BUN 11 05/30/2019    CO2 26 05/30/2019    TSH 0.914 12/21/2018    INR 0.9 08/28/2017    HGBA1C 5.6 12/21/2018       ASSESSMENT & PLAN:     Future Appointments   Date Time Provider Department Center   6/12/2020  9:30 AM JIM Dye [facility]       After Visit Medication List :     Medication List           Accurate as of June 9, 2020 11:50 AM. If you have any questions, ask your nurse or doctor.               CONTINUE taking these medications    albuterol 2.5 mg /3 mL (0.083 %) nebulizer solution  Commonly known as:  PROVENTIL  Take 3 mLs (2.5 mg total) by nebulization every 6 (six) hours as needed for Wheezing.     aspirin 81 MG Chew  Take 1 tablet (81 mg total) by mouth once daily.     atorvastatin 40 MG tablet  Commonly known as:  LIPITOR  Take 1 tablet (40 mg total) by mouth once daily.     blood sugar diagnostic Strp  Commonly known as:  ACCU-CHEK BAKARI  1 strip by Misc.(Non-Drug; Combo Route) route once  daily.     blood-glucose meter Misc  Commonly known as:  ACCU-CHEK BAKARI CONNECT METER  1 Units by Misc.(Non-Drug; Combo Route) route once daily.     cetirizine 10 MG tablet  Commonly known as:  ZYRTEC  Take 1 tablet (10 mg total) by mouth once daily.     cholecalciferol (vitamin D3) 125 mcg (5,000 unit) Tab  Commonly known as:  VITAMIN D3  Take 5,000 Units by mouth once daily.     diphenhydrAMINE 25 mg capsule  Commonly known as:  BENADRYL  Take 1 each (25 mg total) by mouth every 6 (six) hours as needed for Itching or Allergies.     DULoxetine 30 MG capsule  Commonly known as:  CYMBALTA  Take 1 capsule (30 mg total) by mouth once daily.     fluticasone propionate 220 mcg/actuation inhaler  Commonly known as:  FLOVENT HFA     gabapentin 300 MG capsule  Commonly known as:  NEURONTIN  Take 1 capsule (300 mg total) by mouth 3 (three) times daily.     ibuprofen 800 MG tablet  Commonly known as:  ADVIL,MOTRIN  Take 1 tablet (800 mg total) by mouth every 6 (six) hours as needed for Pain.     ketoconazole 2 % cream  Commonly known as:  NIZORAL  Apply topically once daily.     metFORMIN 500 MG XR 24hr tablet  Commonly known as:  GLUCOPHAGE-XR  Take 1 tablet (500 mg total) by mouth 2 (two) times daily with meals.     ondansetron 4 MG Tbdl  Commonly known as:  ZOFRAN-ODT  Take 1 tablet (4 mg total) by mouth every 6 (six) hours as needed.     topiramate 50 MG tablet  Commonly known as:  TOPAMAX  TAKE ONE TABLET BY MOUTH TWICE A DAY            Signing Physician:  JIM Smith

## 2020-06-09 NOTE — TELEPHONE ENCOUNTER
When we call she hangs up on us and has no-showed to everything with me for the past year. She was discharged from the practice because of that. A letter should have been sent.    Please advise her to call the [phone] number to get another PCP. She needs to change her PCP with Linda to that person once she's gotten an appointment.    Thanks,  KJ

## 2020-06-09 NOTE — PROGRESS NOTES
HPI     Ms. Dai Roper was referred by self for a glasses recheck.    Patient complains of problems reading with newest glasses that were made   in 12/2020. Problems with computer distance only.       Paitent denies diplopia, headaches, flashes/floaters, itching, tearing,   burning, redness, and pain.    (-)drops    (+)Diabetes  LBS   Hemoglobin A1C       Date                     Value               Ref Range             Status                12/21/2018               5.6                 4.0 - 5.6 %           Final                 05/03/2018               5.7 (H)             4.0 - 5.6 %           Final                  01/19/2018               5.7 (H)             4.0 - 5.6 %           Final               OCULAR HISTORY  Last Eye Exam: 11/22/19 with Dr Stallings  (-)eye surgery   (-)diagnosed or treated for any eye conditions or diseases, n/a    FAMILY HISTORY  (+)Glaucoma, father        Last edited by Emilee Stallings, OD on 6/9/2020 11:19 AM. (History)            Assessment /Plan     For exam results, see Encounter Report.    Hyperopia with astigmatism and presbyopia, bilateral      Thoroughly educated pt on findings.   Vision is great with current glasses: 20/20 and J1 OD, 20/20 and J1 OS.   Patient wearing FT BF. Current specs do ride a little low on face and discussed optical manipulating nose pads in order for glasses to sit higher.   Patient has worn PALs in the past, but no record of PALs being purchased at an [facility] optical within the last year (only her current FT BF). Pt reports she only goes to [facility] optical shop.     Her main complaint is blurred intermediate/computer distance vision. Discussed that intermediate zone not in FT BF. Pt notes understanding.   Given option of lined trifocal, PALs, or having a separate pair of glasses for computer only. Pt would like PALs. Discussed PALs will be more expensive than the other options. Pt notes understanding.   Pt to return to optical to discuss options.  Unsure if will be able to give refund or re-make glasses due to glasses ordered >6 months ago.     Pt to [facility] for annual DM eye exam 11/2020 or sooner if needed.

## 2020-06-11 ENCOUNTER — TELEPHONE (OUTPATIENT)
Dept: PRIMARY CARE CLINIC | Facility: CLINIC | Age: 55
End: 2020-06-11

## 2020-06-11 DIAGNOSIS — I15.2 HYPERTENSION ASSOCIATED WITH DIABETES: ICD-10-CM

## 2020-06-11 DIAGNOSIS — E11.8 TYPE 2 DIABETES MELLITUS WITH COMPLICATION, WITHOUT LONG-TERM CURRENT USE OF INSULIN: ICD-10-CM

## 2020-06-11 DIAGNOSIS — E11.59 HYPERTENSION ASSOCIATED WITH DIABETES: ICD-10-CM

## 2020-06-11 DIAGNOSIS — E78.5 HYPERLIPIDEMIA ASSOCIATED WITH TYPE 2 DIABETES MELLITUS: ICD-10-CM

## 2020-06-11 DIAGNOSIS — E66.01 MORBID OBESITY DUE TO EXCESS CALORIES: ICD-10-CM

## 2020-06-11 DIAGNOSIS — E11.69 HYPERLIPIDEMIA ASSOCIATED WITH TYPE 2 DIABETES MELLITUS: ICD-10-CM

## 2020-06-11 RX ORDER — ALBUTEROL SULFATE 0.83 MG/ML
2.5 SOLUTION RESPIRATORY (INHALATION) EVERY 6 HOURS PRN
Qty: 1 BOX | Refills: 0 | OUTPATIENT
Start: 2020-06-11 | End: 2021-06-11

## 2020-06-11 RX ORDER — ATORVASTATIN CALCIUM 40 MG/1
40 TABLET, FILM COATED ORAL DAILY
Qty: 90 TABLET | Refills: 3 | OUTPATIENT
Start: 2020-06-11 | End: 2021-06-11

## 2020-06-11 RX ORDER — METFORMIN HYDROCHLORIDE 500 MG/1
500 TABLET, EXTENDED RELEASE ORAL 2 TIMES DAILY WITH MEALS
Qty: 180 TABLET | Refills: 3 | OUTPATIENT
Start: 2020-06-11 | End: 2021-06-11

## 2020-06-11 RX ORDER — IBUPROFEN 800 MG/1
800 TABLET ORAL EVERY 6 HOURS PRN
Qty: 20 TABLET | Refills: 0 | OUTPATIENT
Start: 2020-06-11

## 2020-06-11 NOTE — TELEPHONE ENCOUNTER
"Viky Martinez MA  You 32 minutes ago (2:59 PM)      Pt stated she already received a phone call saying you was not her PCP anymore she stated "Y'all do not have to do anything for me" and hung up     Routing comment        "

## 2020-06-11 NOTE — TELEPHONE ENCOUNTER
----- Message from Marion Gale sent at 6/11/2020  1:46 PM CDT -----  Contact: Patient [phone]  Requesting an RX refill or new RX.  Is this a refill or new RX: Refill   RX name and strength: topiramate (TOPAMAX) 50 MG tablet  Directions (copy/paste from chart):    Is this a 30 day or 90 day RX:    Local pharmacy or mail order pharmacy:  Mail  Pharmacy name and phone # [facility] - [address]    Comments:      Requesting an RX refill or new RX.  Is this a refill or new RX:  Refill  RX name and strength: metFORMIN (GLUCOPHAGE-XR) 500 MG XR 24hr tablet  Directions (copy/paste from chart):    Is this a 30 day or 90 day RX:    Local pharmacy or mail order pharmacy:  Local  Pharmacy name and phone #[facility] - [address]        Comments:      Requesting an RX refill or new RX.  Is this a refill or new RX:  Refill  RX name and strength:  ibuprofen (ADVIL,MOTRIN) 800 MG tablet   Directions (copy/paste from chart):    Is this a 30 day or 90 day RX:    Local pharmacy or mail order pharmacy:  Mail  Pharmacy name and phone # [address]         Requesting an RX refill or new RX.  Is this a refill or new RX: Refill   RX name and strength: albuterol (PROVENTIL) 2.5 mg /3 mL (0.083 %) nebulizer solution   Directions (copy/paste from chart):    Is this a 30 day or 90 day RX:    Local pharmacy or mail order pharmacy:  Mail  Pharmacy name and phone #[facility] - [address]      Requesting an RX refill or new RX.  Is this a refill or new RX:  Refill  RX name and strength: atorvastatin (LIPITOR) 40 MG tablet   Directions (copy/paste from chart):    Is this a 30 day or 90 day RX:    Local pharmacy or mail order pharmacy: Mail  Pharmacy name and phone #  [address]      Requesting an  RX refill or new RX.  Is this a refill or new RX:  Refill  RX name and strength: blood sugar diagnostic (ACCU-CHEK BAKARI) Strp   Directions (copy/paste from chart):    Is this a 30 day or 90 day RX:    Local pharmacy or mail order pharmacy:  Mail  Pharmacy name and phone #[facility] [address]       Requesting an RX refill or new RX.  Is this a refill or new RX: Refill   RX name and strength: blood-glucose meter (ACCU-CHEK BAKARI CONNECT METER  Directions (copy/paste from chart):    Is this a 30 day or 90 day RX:    Local pharmacy or mail order pharmacy:  Mail  Pharmacy name and phone #[facility] - [place], [place] - [address]      Comments:  Thank you                                         blood-glucose meter (ACCU-CHEK BAKARI CONNECT METER

## 2020-06-11 NOTE — TELEPHONE ENCOUNTER
Please reach out to the patient and address these issues:      Patient has no-showed for 1 year, she needs to get a new PCP. Please help her do that.    I will provide one med refill for her meds, but nothing beyond that. Where does she want it to go, [facility]?    Thanks,  KJ

## 2020-06-12 ENCOUNTER — OFFICE VISIT (OUTPATIENT)
Dept: INTERNAL MEDICINE | Facility: CLINIC | Age: 55
End: 2020-06-12
Payer: MEDICARE

## 2020-06-12 ENCOUNTER — TELEPHONE (OUTPATIENT)
Dept: PRIMARY CARE CLINIC | Facility: CLINIC | Age: 55
End: 2020-06-12

## 2020-06-12 VITALS
SYSTOLIC BLOOD PRESSURE: 114 MMHG | HEIGHT: 62 IN | WEIGHT: 245.56 LBS | OXYGEN SATURATION: 95 % | BODY MASS INDEX: 45.19 KG/M2 | HEART RATE: 78 BPM | DIASTOLIC BLOOD PRESSURE: 76 MMHG

## 2020-06-12 DIAGNOSIS — E78.5 HYPERLIPIDEMIA ASSOCIATED WITH TYPE 2 DIABETES MELLITUS: ICD-10-CM

## 2020-06-12 DIAGNOSIS — E11.8 TYPE 2 DIABETES MELLITUS WITH COMPLICATION, WITHOUT LONG-TERM CURRENT USE OF INSULIN: ICD-10-CM

## 2020-06-12 DIAGNOSIS — E11.59 HYPERTENSION ASSOCIATED WITH DIABETES: ICD-10-CM

## 2020-06-12 DIAGNOSIS — E55.9 VITAMIN D DEFICIENCY: ICD-10-CM

## 2020-06-12 DIAGNOSIS — Z00.00 PREVENTATIVE HEALTH CARE: Primary | ICD-10-CM

## 2020-06-12 DIAGNOSIS — E11.42 DIABETIC POLYNEUROPATHY ASSOCIATED WITH TYPE 2 DIABETES MELLITUS: ICD-10-CM

## 2020-06-12 DIAGNOSIS — F33.1 MODERATE EPISODE OF RECURRENT MAJOR DEPRESSIVE DISORDER: ICD-10-CM

## 2020-06-12 DIAGNOSIS — I15.2 HYPERTENSION ASSOCIATED WITH DIABETES: ICD-10-CM

## 2020-06-12 DIAGNOSIS — R92.8 OTHER ABNORMAL AND INCONCLUSIVE FINDINGS ON DIAGNOSTIC IMAGING OF BREAST: ICD-10-CM

## 2020-06-12 DIAGNOSIS — J44.89 CHRONIC OBSTRUCTIVE ASTHMA: ICD-10-CM

## 2020-06-12 DIAGNOSIS — F41.1 GAD (GENERALIZED ANXIETY DISORDER): ICD-10-CM

## 2020-06-12 DIAGNOSIS — Z12.11 COLON CANCER SCREENING: ICD-10-CM

## 2020-06-12 DIAGNOSIS — E11.69 HYPERLIPIDEMIA ASSOCIATED WITH TYPE 2 DIABETES MELLITUS: ICD-10-CM

## 2020-06-12 DIAGNOSIS — Z11.4 ENCOUNTER FOR SCREENING FOR HUMAN IMMUNODEFICIENCY VIRUS (HIV): ICD-10-CM

## 2020-06-12 PROCEDURE — 3074F SYST BP LT 130 MM HG: CPT | Mod: HCNC,CPTII,S$GLB, | Performed by: NURSE PRACTITIONER

## 2020-06-12 PROCEDURE — 3078F PR MOST RECENT DIASTOLIC BLOOD PRESSURE < 80 MM HG: ICD-10-PCS | Mod: HCNC,CPTII,S$GLB, | Performed by: NURSE PRACTITIONER

## 2020-06-12 PROCEDURE — 99499 UNLISTED E&M SERVICE: CPT | Mod: HCNC,S$GLB,, | Performed by: NURSE PRACTITIONER

## 2020-06-12 PROCEDURE — 99999 PR PBB SHADOW E&M-EST. PATIENT-LVL V: ICD-10-PCS | Mod: PBBFAC,HCNC,, | Performed by: NURSE PRACTITIONER

## 2020-06-12 PROCEDURE — 99999 PR PBB SHADOW E&M-EST. PATIENT-LVL V: CPT | Mod: PBBFAC,HCNC,, | Performed by: NURSE PRACTITIONER

## 2020-06-12 PROCEDURE — 3074F PR MOST RECENT SYSTOLIC BLOOD PRESSURE < 130 MM HG: ICD-10-PCS | Mod: HCNC,CPTII,S$GLB, | Performed by: NURSE PRACTITIONER

## 2020-06-12 PROCEDURE — 99396 PR PREVENTIVE VISIT,EST,40-64: ICD-10-PCS | Mod: HCNC,S$GLB,, | Performed by: NURSE PRACTITIONER

## 2020-06-12 PROCEDURE — 3078F DIAST BP <80 MM HG: CPT | Mod: HCNC,CPTII,S$GLB, | Performed by: NURSE PRACTITIONER

## 2020-06-12 PROCEDURE — 99499 RISK ADDL DX/OHS AUDIT: ICD-10-PCS | Mod: HCNC,S$GLB,, | Performed by: NURSE PRACTITIONER

## 2020-06-12 PROCEDURE — 99396 PREV VISIT EST AGE 40-64: CPT | Mod: HCNC,S$GLB,, | Performed by: NURSE PRACTITIONER

## 2020-06-12 RX ORDER — KETOCONAZOLE 20 MG/G
CREAM TOPICAL DAILY
Qty: 60 G | Refills: 1 | Status: SHIPPED | OUTPATIENT
Start: 2020-06-12 | End: 2023-06-27

## 2020-06-12 RX ORDER — ALBUTEROL SULFATE 0.83 MG/ML
2.5 SOLUTION RESPIRATORY (INHALATION) EVERY 6 HOURS PRN
Qty: 1 BOX | Refills: 6 | Status: SHIPPED | OUTPATIENT
Start: 2020-06-12 | End: 2023-06-13

## 2020-06-12 RX ORDER — ATORVASTATIN CALCIUM 40 MG/1
40 TABLET, FILM COATED ORAL DAILY
Qty: 90 TABLET | Refills: 3
Start: 2020-06-12 | End: 2020-12-28 | Stop reason: SDUPTHER

## 2020-06-12 NOTE — PROGRESS NOTES
"ANNUAL VISIT NOTE     PRESENTING HISTORY     Reason for Visit:  Annual visit.    No chief complaint on file.      History of Present Illness & ROS: Ms. Dai Roper is a 54 y.o. female.  Here for annual and request to est care with Dr. LAW Holguin. She was former patient of Dr. Corrigan and release from [facility]. Will perform her Annual today, and as advised, will need to return to be seen by Dr. LAW Holguin to "establish".   She is having no active medical issues to report today.   Reports that the recent death of brother 'had be acting very funny when being seen by Dr. DUVALL, started being followed by Psychiatry and on two pills for depression and doing much better now". No SI or HI. Continues to follow up with Psychiatry.   She is very conversant, but pleasant and cooperative on today's clinic visit.    Review of Systems:  Eyes: denies visual changes at this time denies floaters   ENT: no nasal congestion or sore throat  Respiratory: no cough or shorness of breath  Cardiovascular: no chest pain or palpitations  Gastrointestinal: no nausea or vomiting, no abdominal pain or change in bowel habits  Genitourinary: no hematuria or dysuria; denies frequency  Hematologic/Lymphatic: no easy bruising or lymphadenopathy  Musculoskeletal: no arthralgias or myalgias  Neurological: no seizures or tremors  Endocrine: no heat or cold intolerance    PAST HISTORY:     Past Medical History:   Diagnosis Date    Anxiety     Asthma     Depression     Diabetes mellitus     DVT (deep venous thrombosis)     Hx of psychiatric care     Hypertension     Psychiatric problem     Sleep difficulties        Past Surgical History:   Procedure Laterality Date    BREAST BIOPSY Left 12/18/2018    CARPAL TUNNEL RELEASE      HERNIA REPAIR      TUBAL LIGATION         Family History   Problem Relation Age of Onset    Diabetes Mother     Heart disease Mother     Hypertension Mother     Lupus Mother     Depression Mother     " Anxiety disorder Mother     Heart disease Father     Hypertension Father     Hypertension Sister     Lupus Sister     No Known Problems Brother     Anxiety disorder Daughter     Depression Daughter     Depression Son     Anxiety disorder Son     Hypertension Sister     Hypertension Sister     Breast cancer Maternal Aunt        Social History     Socioeconomic History    Marital status: Single     Spouse name: Not on file    Number of children: Not on file    Years of education: Not on file    Highest education level: Not on file   Occupational History    Occupation: disabled   Social Needs    Financial resource strain: Somewhat hard    Food insecurity:     Worry: Often true     Inability: Sometimes true    Transportation needs:     Medical: No     Non-medical: No   Tobacco Use    Smoking status: Never Smoker    Smokeless tobacco: Never Used   Substance and Sexual Activity    Alcohol use: No    Drug use: No    Sexual activity: Not Currently   Lifestyle    Physical activity:     Days per week: Not on file     Minutes per session: Not on file    Stress: Not on file   Relationships    Social connections:     Talks on phone: Not on file     Gets together: Not on file     Attends Holiness service: Not on file     Active member of club or organization: Not on file     Attends meetings of clubs or organizations: Not on file     Relationship status: Not on file   Other Topics Concern    Patient feels they ought to cut down on drinking/drug use Not Asked    Patient annoyed by others criticizing their drinking/drug use Not Asked    Patient has felt bad or guilty about drinking/drug use Not Asked    Patient has had a drink/used drugs as an eye opener in the AM Not Asked   Social History Narrative    Not on file       MEDICATIONS & ALLERGIES:     Current Outpatient Medications on File Prior to Visit   Medication Sig Dispense Refill    albuterol (PROVENTIL) 2.5 mg /3 mL (0.083 %) nebulizer  solution Take 3 mLs (2.5 mg total) by nebulization every 6 (six) hours as needed for Wheezing. 1 Box 0    aspirin 81 MG Chew Take 1 tablet (81 mg total) by mouth once daily. 90 tablet 3    atorvastatin (LIPITOR) 40 MG tablet Take 1 tablet (40 mg total) by mouth once daily. 90 tablet 3    blood sugar diagnostic (ACCU-CHEK BAKARI) Strp 1 strip by Misc.(Non-Drug; Combo Route) route once daily. 200 each 11    blood-glucose meter (ACCU-CHEK BAKARI CONNECT METER) Misc 1 Units by Misc.(Non-Drug; Combo Route) route once daily. 1 each 0    cetirizine (ZYRTEC) 10 MG tablet Take 1 tablet (10 mg total) by mouth once daily. 30 tablet 0    cholecalciferol, vitamin D3, (VITAMIN D3) 5,000 unit Tab Take 5,000 Units by mouth once daily. 90 tablet 3    diphenhydrAMINE (BENADRYL) 25 mg capsule Take 1 each (25 mg total) by mouth every 6 (six) hours as needed for Itching or Allergies. (Patient not taking: Reported on 6/9/2020) 20 capsule 0    DULoxetine (CYMBALTA) 30 MG capsule Take 1 capsule (30 mg total) by mouth once daily. 90 capsule 3    fluticasone (FLOVENT HFA) 220 mcg/actuation inhaler Inhale 1 puff into the lungs 2 (two) times daily.      gabapentin (NEURONTIN) 300 MG capsule Take 1 capsule (300 mg total) by mouth 3 (three) times daily. 90 capsule 3    ibuprofen (ADVIL,MOTRIN) 800 MG tablet Take 1 tablet (800 mg total) by mouth every 6 (six) hours as needed for Pain. (Patient not taking: Reported on 6/9/2020) 20 tablet 0    ketoconazole (NIZORAL) 2 % cream Apply topically once daily. 60 g 5    metFORMIN (GLUCOPHAGE-XR) 500 MG XR 24hr tablet Take 1 tablet (500 mg total) by mouth 2 (two) times daily with meals. 180 tablet 3    ondansetron (ZOFRAN-ODT) 4 MG TbDL Take 1 tablet (4 mg total) by mouth every 6 (six) hours as needed. 15 tablet 0    topiramate (TOPAMAX) 50 MG tablet TAKE ONE TABLET BY MOUTH TWICE A  tablet 3     No current facility-administered medications on file prior to visit.         Review of  patient's allergies indicates:   Allergen Reactions    Vitamin d analogue Edema     NOT allergy, but reaction to high dose vitamin D       Medications Reconciliation:   I have reconciled the patient's home medications and discharge medications with the patient/family. I have updated all changes.  Refer to After-Visit Medication List.    OBJECTIVE:     Vital Signs:  There were no vitals filed for this visit.  Wt Readings from Last 1 Encounters:   02/12/20 0920 109.8 kg (242 lb)     There is no height or weight on file to calculate BMI.     Wt Readings from Last 3 Encounters:   06/12/20 111.4 kg (245 lb 9.5 oz)   02/12/20 109.8 kg (242 lb)   01/27/20 109.8 kg (242 lb 2.8 oz)     Temp Readings from Last 3 Encounters:   01/27/20 97.6 °F (36.4 °C) (Oral)   05/30/19 98 °F (36.7 °C) (Oral)   01/08/19 98.7 °F (37.1 °C)     BP Readings from Last 3 Encounters:   06/12/20 114/76   02/12/20 132/85   01/27/20 132/65     Pulse Readings from Last 3 Encounters:   06/12/20 78   02/12/20 105   01/27/20 76       Physical Exam:  General: Well developed, well nourished. No distress.  HEENT: Head is normocephalic, atraumatic; ears are normal.   Eyes: Clear conjunctiva.  Neck: Supple, symmetrical neck; trachea midline.  Lungs: Clear to auscultation bilaterally and normal respiratory effort.  Cardiovascular: Heart with regular rate and rhythm. No murmurs, gallops or rubs  Extremities: No LE edema. Pulses 2+ and symmetric.   Abdomen: Abdomen is soft, non-tender non-distended with normal bowel sounds.  Skin: Skin color, texture, turgor normal. No rashes.  Musculoskeletal: Normal gait.   Lymph Nodes: No cervical or supraclavicular adenopathy.  Neurologic: Normal strength and tone. No focal numbness or weakness.   Psychiatric: Not depressed.    Laboratory  Lab Results   Component Value Date    WBC 5.89 05/30/2019    HGB 12.3 05/30/2019    HCT 37.9 05/30/2019     05/30/2019    CHOL 251 (H) 12/21/2018    TRIG 107 12/21/2018    HDL 74  "12/21/2018    ALT 13 05/30/2019    AST 13 05/30/2019     05/30/2019    K 3.7 05/30/2019     05/30/2019    CREATININE 0.7 05/30/2019    BUN 11 05/30/2019    CO2 26 05/30/2019    TSH 0.914 12/21/2018    INR 0.9 08/28/2017    HGBA1C 5.6 12/21/2018         ASSESSMENT & PLAN:     Former patient of Dr. Daniels that has been released from the [facility] due to non adherence with their practice plans and "hanging up on clinic staff".       Preventative health care  -     Comprehensive metabolic panel; Future; Expected date: 06/12/2020  -     CBC auto differential; Future; Expected date: 06/12/2020  -     Lipid Panel; Future; Expected date: 06/12/2020  -     Hemoglobin A1C; Future; Expected date: 06/12/2020  -     TSH; Future; Expected date: 06/12/2020  -     Mammo Digital Diagnostic Bilateral With CAD; Future; Expected date: 06/12/2020 (Due: 12/2020)    Colon cancer screening  -     Case request GI: COLONOSCOPY  -     HIV 1/2 Ag/Ab (4th Gen); Future; Expected date: 06/12/2020      Hyperlipidemia associated with type 2 diabetes mellitus  -     Lipid Panel; Future; Expected date: 06/12/2020  -     atorvastatin (LIPITOR) 40 MG tablet; Take 1 tablet (40 mg total) by mouth once daily.  Dispense: 90 tablet; Refill: 3      Encounter for screening for human immunodeficiency virus (HIV)   -     HIV 1/2 Ag/Ab (4th Gen); Future; Expected date: 06/12/2020      Other abnormal and inconclusive findings on diagnostic imaging of breast   -     Mammo Digital Diagnostic Bilateral With CAD; Future; Expected date: 06/12/2020 (Due: 12/2020)        Vitamin D deficiency  ` continue 5000 dose for now    Uncontrolled secondary diabetes mellitus with stage 2 CKD (GFR 60-89)    Type 2 diabetes mellitus with complication, without long-term current use of insulin  Most recent A1c: 5.6% (12/2018)   ` Glucophage  ` check A1c   ` follow up with Optometry and Podiatry (appt:  *On Statin therapy  *Seen by Dr. Stallings on 6/9/2020  -     " Ambulatory referral/consult to Podiatry; Future; Expected date: 06/19/2020    LANIE (generalized anxiety disorder)      Chronic Asthma, controlled:   Chronic obstructive asthma  Sating today: 95%RA  ` Proventil  ` Zyrtec   ` Flovent  -     albuterol (PROVENTIL) 2.5 mg /3 mL (0.083 %) nebulizer solution; Take 3 mLs (2.5 mg total) by nebulization every 6 (six) hours as needed for Wheezing.  Dispense: 1 Box; Refill: 6    CPS:   ` Neurontin  ` Cymbalta      Hyperlipidemia:   Lipitor  ` check panel (fasting)      Immunizations  TDaP: today   Shingrix Series: future        Screenings:   C Scope; order placed      *Advised to follow up with one of our IM Physician providers, being she cannot est care with an [facility] (PA or NP) and to be considered fully est'd in medical care with our practice site.   *Advised to schedule labs and follow up appts before leaving clinic today.      Medication List           Accurate as of June 12, 2020 12:37 PM. If you have any questions, ask your nurse or doctor.               CONTINUE taking these medications    albuterol 2.5 mg /3 mL (0.083 %) nebulizer solution  Commonly known as:  PROVENTIL  Take 3 mLs (2.5 mg total) by nebulization every 6 (six) hours as needed for Wheezing.     aspirin 81 MG Chew  Take 1 tablet (81 mg total) by mouth once daily.     atorvastatin 40 MG tablet  Commonly known as:  LIPITOR  Take 1 tablet (40 mg total) by mouth once daily.     blood sugar diagnostic Strp  Commonly known as:  ACCU-CHEK BAKARI  1 strip by Misc.(Non-Drug; Combo Route) route once daily.     blood-glucose meter Misc  Commonly known as:  ACCU-CHEK BAKARI CONNECT METER  1 Units by Misc.(Non-Drug; Combo Route) route once daily.     cholecalciferol (vitamin D3) 125 mcg (5,000 unit) Tab  Commonly known as:  VITAMIN D3  Take 5,000 Units by mouth once daily.     DULoxetine 30 MG capsule  Commonly known as:  CYMBALTA  Take 1 capsule (30 mg total) by mouth once daily.     fluticasone propionate 220  mcg/actuation inhaler  Commonly known as:  FLOVENT HFA     gabapentin 300 MG capsule  Commonly known as:  NEURONTIN  Take 1 capsule (300 mg total) by mouth 3 (three) times daily.     ibuprofen 800 MG tablet  Commonly known as:  ADVIL,MOTRIN  Take 1 tablet (800 mg total) by mouth every 6 (six) hours as needed for Pain.     ketoconazole 2 % cream  Commonly known as:  NIZORAL  Apply topically once daily.     metFORMIN 500 MG XR 24hr tablet  Commonly known as:  GLUCOPHAGE-XR  Take 1 tablet (500 mg total) by mouth 2 (two) times daily with meals.     topiramate 50 MG tablet  Commonly known as:  TOPAMAX  TAKE ONE TABLET BY MOUTH TWICE A DAY        STOP taking these medications    cetirizine 10 MG tablet  Commonly known as:  ZYRTEC  Stopped by:  JIM Smith     diphenhydrAMINE 25 mg capsule  Commonly known as:  BENADRYL  Stopped by:  JIM Smith     ondansetron 4 MG Tbdl  Commonly known as:  ZOFRAN-ODT  Stopped by:  JIM Simth           Where to Get Your Medications      These medications were sent to [facility] [address] [place] [id number]    Phone:  [phone]   · albuterol 2.5 mg /3 mL (0.083 %) nebulizer solution  · ketoconazole 2 % cream     Information about where to get these medications is not yet available    Ask your nurse or doctor about these medications  · atorvastatin 40 MG tablet         Signing Physician:  JIM Smith

## 2020-06-18 ENCOUNTER — TELEPHONE (OUTPATIENT)
Dept: INTERNAL MEDICINE | Facility: CLINIC | Age: 55
End: 2020-06-18

## 2020-06-18 NOTE — TELEPHONE ENCOUNTER
----- Message from Ailin Mason sent at 6/18/2020  9:39 AM CDT -----  Regarding: Pt self Mobile/Home [phone]  Patient would like a call back in regards to her wanting to know if you would like for her to have a A1C test done? Patient is coming in for a fasting lab on 06/26/2020.0

## 2020-06-24 DIAGNOSIS — E11.8 TYPE 2 DIABETES MELLITUS WITH COMPLICATION, WITHOUT LONG-TERM CURRENT USE OF INSULIN: ICD-10-CM

## 2020-06-24 DIAGNOSIS — E11.69 HYPERLIPIDEMIA ASSOCIATED WITH TYPE 2 DIABETES MELLITUS: ICD-10-CM

## 2020-06-24 DIAGNOSIS — E11.59 HYPERTENSION ASSOCIATED WITH DIABETES: ICD-10-CM

## 2020-06-24 DIAGNOSIS — E78.5 HYPERLIPIDEMIA ASSOCIATED WITH TYPE 2 DIABETES MELLITUS: ICD-10-CM

## 2020-06-24 DIAGNOSIS — I15.2 HYPERTENSION ASSOCIATED WITH DIABETES: ICD-10-CM

## 2020-06-24 RX ORDER — DEXTROSE 4 G
1 TABLET,CHEWABLE ORAL DAILY
Qty: 1 EACH | Refills: 0 | Status: SHIPPED | OUTPATIENT
Start: 2020-06-24 | End: 2023-06-13

## 2020-06-24 NOTE — TELEPHONE ENCOUNTER
----- Message from Sheng Judge sent at 6/24/2020  3:39 PM CDT -----  Contact: Bria with [facility]   Requesting an RX refill or new RX.  Is this a refill or new RX:  new  RX name and strength: Diabetic supplies (accu check robert strips, lancets, meter, solution and alcohol swabs)  Directions (copy/paste from chart):    Is this a 30 day or 90 day RX:    Local pharmacy or mail order pharmacy:  mail order  Pharmacy name and phone # (copy/paste from chart):   [facility] - [place], [place] - [address] [phone] (Phone)  [phone] (Fax)  Comments:

## 2020-06-26 ENCOUNTER — TELEPHONE (OUTPATIENT)
Dept: INTERNAL MEDICINE | Facility: CLINIC | Age: 55
End: 2020-06-26

## 2020-06-26 ENCOUNTER — LAB VISIT (OUTPATIENT)
Dept: LAB | Facility: HOSPITAL | Age: 55
End: 2020-06-26
Payer: MEDICARE

## 2020-06-26 DIAGNOSIS — E11.69 HYPERLIPIDEMIA ASSOCIATED WITH TYPE 2 DIABETES MELLITUS: ICD-10-CM

## 2020-06-26 DIAGNOSIS — E78.5 HYPERLIPIDEMIA ASSOCIATED WITH TYPE 2 DIABETES MELLITUS: ICD-10-CM

## 2020-06-26 DIAGNOSIS — Z00.00 PREVENTATIVE HEALTH CARE: ICD-10-CM

## 2020-06-26 DIAGNOSIS — Z11.4 ENCOUNTER FOR SCREENING FOR HUMAN IMMUNODEFICIENCY VIRUS (HIV): ICD-10-CM

## 2020-06-26 LAB
ALBUMIN SERPL BCP-MCNC: 3.7 G/DL (ref 3.5–5.2)
ALP SERPL-CCNC: 66 U/L (ref 55–135)
ALT SERPL W/O P-5'-P-CCNC: 10 U/L (ref 10–44)
ANION GAP SERPL CALC-SCNC: 7 MMOL/L (ref 8–16)
AST SERPL-CCNC: 16 U/L (ref 10–40)
BASOPHILS # BLD AUTO: 0.03 K/UL (ref 0–0.2)
BASOPHILS NFR BLD: 0.7 % (ref 0–1.9)
BILIRUB SERPL-MCNC: 0.3 MG/DL (ref 0.1–1)
BUN SERPL-MCNC: 8 MG/DL (ref 6–20)
CALCIUM SERPL-MCNC: 9.5 MG/DL (ref 8.7–10.5)
CHLORIDE SERPL-SCNC: 110 MMOL/L (ref 95–110)
CHOLEST SERPL-MCNC: 279 MG/DL (ref 120–199)
CHOLEST/HDLC SERPL: 3.2 {RATIO} (ref 2–5)
CO2 SERPL-SCNC: 24 MMOL/L (ref 23–29)
CREAT SERPL-MCNC: 0.7 MG/DL (ref 0.5–1.4)
DIFFERENTIAL METHOD: ABNORMAL
EOSINOPHIL # BLD AUTO: 0.1 K/UL (ref 0–0.5)
EOSINOPHIL NFR BLD: 1.6 % (ref 0–8)
ERYTHROCYTE [DISTWIDTH] IN BLOOD BY AUTOMATED COUNT: 13.5 % (ref 11.5–14.5)
EST. GFR  (AFRICAN AMERICAN): >60 ML/MIN/1.73 M^2
EST. GFR  (NON AFRICAN AMERICAN): >60 ML/MIN/1.73 M^2
ESTIMATED AVG GLUCOSE: 114 MG/DL (ref 68–131)
GLUCOSE SERPL-MCNC: 91 MG/DL (ref 70–110)
HBA1C MFR BLD HPLC: 5.6 % (ref 4–5.6)
HCT VFR BLD AUTO: 38.3 % (ref 37–48.5)
HDLC SERPL-MCNC: 87 MG/DL (ref 40–75)
HDLC SERPL: 31.2 % (ref 20–50)
HGB BLD-MCNC: 12 G/DL (ref 12–16)
HIV 1+2 AB+HIV1 P24 AG SERPL QL IA: NEGATIVE
IMM GRANULOCYTES # BLD AUTO: 0 K/UL (ref 0–0.04)
IMM GRANULOCYTES NFR BLD AUTO: 0 % (ref 0–0.5)
LDLC SERPL CALC-MCNC: 179.2 MG/DL (ref 63–159)
LYMPHOCYTES # BLD AUTO: 2.4 K/UL (ref 1–4.8)
LYMPHOCYTES NFR BLD: 55.4 % (ref 18–48)
MCH RBC QN AUTO: 29.1 PG (ref 27–31)
MCHC RBC AUTO-ENTMCNC: 31.3 G/DL (ref 32–36)
MCV RBC AUTO: 93 FL (ref 82–98)
MONOCYTES # BLD AUTO: 0.2 K/UL (ref 0.3–1)
MONOCYTES NFR BLD: 4.6 % (ref 4–15)
NEUTROPHILS # BLD AUTO: 1.7 K/UL (ref 1.8–7.7)
NEUTROPHILS NFR BLD: 37.7 % (ref 38–73)
NONHDLC SERPL-MCNC: 192 MG/DL
NRBC BLD-RTO: 0 /100 WBC
PLATELET # BLD AUTO: 231 K/UL (ref 150–350)
PMV BLD AUTO: 12.2 FL (ref 9.2–12.9)
POTASSIUM SERPL-SCNC: 3.9 MMOL/L (ref 3.5–5.1)
PROT SERPL-MCNC: 7.3 G/DL (ref 6–8.4)
RBC # BLD AUTO: 4.12 M/UL (ref 4–5.4)
SODIUM SERPL-SCNC: 141 MMOL/L (ref 136–145)
TRIGL SERPL-MCNC: 64 MG/DL (ref 30–150)
TSH SERPL DL<=0.005 MIU/L-ACNC: 0.69 UIU/ML (ref 0.4–4)
WBC # BLD AUTO: 4.39 K/UL (ref 3.9–12.7)

## 2020-06-26 PROCEDURE — 80053 COMPREHEN METABOLIC PANEL: CPT | Mod: HCNC

## 2020-06-26 PROCEDURE — 85025 COMPLETE CBC W/AUTO DIFF WBC: CPT | Mod: HCNC

## 2020-06-26 PROCEDURE — 80061 LIPID PANEL: CPT | Mod: HCNC

## 2020-06-26 PROCEDURE — 86703 HIV-1/HIV-2 1 RESULT ANTBDY: CPT | Mod: HCNC

## 2020-06-26 PROCEDURE — 83036 HEMOGLOBIN GLYCOSYLATED A1C: CPT | Mod: HCNC

## 2020-06-26 PROCEDURE — 84443 ASSAY THYROID STIM HORMONE: CPT | Mod: HCNC

## 2020-06-26 PROCEDURE — 36415 COLL VENOUS BLD VENIPUNCTURE: CPT | Mod: HCNC

## 2020-06-26 NOTE — TELEPHONE ENCOUNTER
"----- Message from JIM Dye sent at 6/26/2020  2:11 PM CDT -----  Please call and inform the patient that overall, her labs look good, with the exception of her "total" Cholesterol, but since sending in refills for her Lipitor and as long as she is taking consistently now, should continue to improve. Would also advise to watching "dietary" intake of high saturated-fatty foods.   Thanks.  "

## 2020-10-07 ENCOUNTER — PATIENT MESSAGE (OUTPATIENT)
Dept: ADMINISTRATIVE | Facility: HOSPITAL | Age: 55
End: 2020-10-07

## 2020-10-07 ENCOUNTER — PATIENT OUTREACH (OUTPATIENT)
Dept: ADMINISTRATIVE | Facility: HOSPITAL | Age: 55
End: 2020-10-07

## 2020-10-07 NOTE — PROGRESS NOTES
Est Care- Message sent to call to schedule her Colonoscopy, MMG, Foot Exam and Eye Exam - PCP to address Labs and Vaccines Due.- Message sent to obtain where her external PAP/ HPV was done to update the HM.

## 2020-11-06 ENCOUNTER — OFFICE VISIT (OUTPATIENT)
Dept: INTERNAL MEDICINE | Facility: CLINIC | Age: 55
End: 2020-11-06
Payer: MEDICARE

## 2020-11-06 VITALS
OXYGEN SATURATION: 98 % | BODY MASS INDEX: 45.68 KG/M2 | HEIGHT: 62 IN | SYSTOLIC BLOOD PRESSURE: 120 MMHG | DIASTOLIC BLOOD PRESSURE: 70 MMHG | WEIGHT: 248.25 LBS | HEART RATE: 78 BPM

## 2020-11-06 DIAGNOSIS — M65.4 DE QUERVAIN'S TENOSYNOVITIS: Primary | ICD-10-CM

## 2020-11-06 PROCEDURE — 3074F PR MOST RECENT SYSTOLIC BLOOD PRESSURE < 130 MM HG: ICD-10-PCS | Mod: HCNC,CPTII,GC,S$GLB | Performed by: INTERNAL MEDICINE

## 2020-11-06 PROCEDURE — 3008F PR BODY MASS INDEX (BMI) DOCUMENTED: ICD-10-PCS | Mod: HCNC,CPTII,GC,S$GLB | Performed by: INTERNAL MEDICINE

## 2020-11-06 PROCEDURE — 99213 PR OFFICE/OUTPT VISIT, EST, LEVL III, 20-29 MIN: ICD-10-PCS | Mod: HCNC,GC,S$GLB, | Performed by: INTERNAL MEDICINE

## 2020-11-06 PROCEDURE — 3078F PR MOST RECENT DIASTOLIC BLOOD PRESSURE < 80 MM HG: ICD-10-PCS | Mod: HCNC,CPTII,GC,S$GLB | Performed by: INTERNAL MEDICINE

## 2020-11-06 PROCEDURE — 3078F DIAST BP <80 MM HG: CPT | Mod: HCNC,CPTII,GC,S$GLB | Performed by: INTERNAL MEDICINE

## 2020-11-06 PROCEDURE — 99213 OFFICE O/P EST LOW 20 MIN: CPT | Mod: HCNC,GC,S$GLB, | Performed by: INTERNAL MEDICINE

## 2020-11-06 PROCEDURE — 3074F SYST BP LT 130 MM HG: CPT | Mod: HCNC,CPTII,GC,S$GLB | Performed by: INTERNAL MEDICINE

## 2020-11-06 PROCEDURE — 99999 PR PBB SHADOW E&M-EST. PATIENT-LVL IV: CPT | Mod: PBBFAC,HCNC,GC, | Performed by: INTERNAL MEDICINE

## 2020-11-06 PROCEDURE — 99999 PR PBB SHADOW E&M-EST. PATIENT-LVL IV: ICD-10-PCS | Mod: PBBFAC,HCNC,GC, | Performed by: INTERNAL MEDICINE

## 2020-11-06 PROCEDURE — 3008F BODY MASS INDEX DOCD: CPT | Mod: HCNC,CPTII,GC,S$GLB | Performed by: INTERNAL MEDICINE

## 2020-11-06 RX ORDER — DICLOFENAC SODIUM 10 MG/G
2 GEL TOPICAL 2 TIMES DAILY
Qty: 1 TUBE | Refills: 0 | Status: SHIPPED | OUTPATIENT
Start: 2020-11-06 | End: 2023-06-27

## 2020-11-06 RX ORDER — MELOXICAM 15 MG/1
15 TABLET ORAL DAILY
Qty: 7 TABLET | Refills: 0 | Status: SHIPPED | OUTPATIENT
Start: 2020-11-06 | End: 2023-06-27

## 2020-11-06 NOTE — PROGRESS NOTES
INTERNAL MEDICINE RESIDENT CLINIC  CLINIC NOTE    Patient Name: Dai Roper  YOB: 1965    PRESENTING HISTORY       History of Present Illness:  Ms. Dai Roper is a 55 y.o. female w/ PMhx of Anxiety, depression, asthma, T2DM, HTN, tenosinovitis of Quervain, presenting to the Urgent Care due to R sided wrist pain.    She reports that her pain has been going on for about a month. Pain located in the R radial tendon sheath up to mid forearm. He denies any trauma, but she has been using her hands a lot. 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 She does have a history of tenosinovitis of Quervain for about 8 years and has this pain on and off. She has been seen by Orthopedics in the past. She had a similar pain in 02/20 and saw Dr. Macedo (ortho) who gave her a steroid injection and her pain went away for several months until now. She has never tried physical therapy other than after her initial surgery 8-10 years ago. On this occasion she has only tried tylenol with minimal relief      Review of Systems   Constitutional: Negative for chills, fever and malaise/fatigue.   HENT: Negative for congestion, ear discharge, sinus pain and sore throat.    Eyes: Negative for blurred vision.   Respiratory: Negative for cough and shortness of breath.    Cardiovascular: Negative for chest pain, palpitations, orthopnea, leg swelling and PND.   Gastrointestinal: Negative for abdominal pain, constipation, diarrhea, heartburn, nausea and vomiting.   Genitourinary: Negative for dysuria, flank pain and hematuria.   Musculoskeletal: Negative for joint pain and myalgias.   Skin: Negative for rash.   Neurological: Negative for dizziness, weakness and headaches.   Psychiatric/Behavioral: Negative.          PAST HISTORY:     Past Medical History:   Diagnosis Date    Anxiety     Asthma     Depression     Diabetes mellitus     DVT (deep venous thrombosis)     Hx of psychiatric care      Hypertension     Psychiatric problem     Sleep difficulties        Past Surgical History:   Procedure Laterality Date    BREAST BIOPSY Left 12/18/2018    CARPAL TUNNEL RELEASE      HERNIA REPAIR      TUBAL LIGATION         Family History   Problem Relation Age of Onset    Diabetes Mother     Heart disease Mother     Hypertension Mother     Lupus Mother     Depression Mother     Anxiety disorder Mother     Heart disease Father     Hypertension Father     Hypertension Sister     Lupus Sister     No Known Problems Brother     Anxiety disorder Daughter     Depression Daughter     Depression Son     Anxiety disorder Son     Hypertension Sister     Hypertension Sister     Breast cancer Maternal Aunt        Social History     Socioeconomic History    Marital status: Single     Spouse name: Not on file    Number of children: Not on file    Years of education: Not on file    Highest education level: Not on file   Occupational History    Occupation: disabled   Social Needs    Financial resource strain: Somewhat hard    Food insecurity     Worry: Often true     Inability: Sometimes true    Transportation needs     Medical: No     Non-medical: No   Tobacco Use    Smoking status: Never Smoker    Smokeless tobacco: Never Used   Substance and Sexual Activity    Alcohol use: No    Drug use: No    Sexual activity: Not Currently   Lifestyle    Physical activity     Days per week: Not on file     Minutes per session: Not on file    Stress: Not on file   Relationships    Social connections     Talks on phone: Not on file     Gets together: Not on file     Attends Spiritism service: Not on file     Active member of club or organization: Not on file     Attends meetings of clubs or organizations: Not on file     Relationship status: Not on file   Other Topics Concern    Patient feels they ought to cut down on drinking/drug use Not Asked    Patient annoyed by others criticizing their drinking/drug  use Not Asked    Patient has felt bad or guilty about drinking/drug use Not Asked    Patient has had a drink/used drugs as an eye opener in the AM Not Asked   Social History Narrative    Not on file       MEDICATIONS & ALLERGIES:     Current Outpatient Medications on File Prior to Visit   Medication Sig    albuterol (PROVENTIL) 2.5 mg /3 mL (0.083 %) nebulizer solution Take 3 mLs (2.5 mg total) by nebulization every 6 (six) hours as needed for Wheezing.    aspirin 81 MG Chew Take 1 tablet (81 mg total) by mouth once daily.    atorvastatin (LIPITOR) 40 MG tablet Take 1 tablet (40 mg total) by mouth once daily.    blood sugar diagnostic (ACCU-CHEK BAKARI) Strp 1 strip by Misc.(Non-Drug; Combo Route) route once daily.    blood-glucose meter (ACCU-CHEK BAKARI CONNECT METER) Misc 1 Units by Misc.(Non-Drug; Combo Route) route once daily.    cholecalciferol, vitamin D3, (VITAMIN D3) 5,000 unit Tab Take 5,000 Units by mouth once daily.    DULoxetine (CYMBALTA) 30 MG capsule Take 1 capsule (30 mg total) by mouth once daily.    fluticasone (FLOVENT HFA) 220 mcg/actuation inhaler Inhale 1 puff into the lungs 2 (two) times daily.    gabapentin (NEURONTIN) 300 MG capsule Take 1 capsule (300 mg total) by mouth 3 (three) times daily.    ibuprofen (ADVIL,MOTRIN) 800 MG tablet Take 1 tablet (800 mg total) by mouth every 6 (six) hours as needed for Pain.    ketoconazole (NIZORAL) 2 % cream Apply topically once daily.    metFORMIN (GLUCOPHAGE-XR) 500 MG XR 24hr tablet Take 1 tablet (500 mg total) by mouth 2 (two) times daily with meals.    topiramate (TOPAMAX) 50 MG tablet TAKE ONE TABLET BY MOUTH TWICE A DAY     No current facility-administered medications on file prior to visit.        Review of patient's allergies indicates:   Allergen Reactions    Vitamin d analogue Edema     NOT allergy, but reaction to high dose vitamin D       OBJECTIVE:   Vital Signs:  Vitals:    11/06/20 1026   BP: 120/70   Pulse: 78   SpO2:  "98%   Weight: 112.6 kg (248 lb 3.8 oz)   Height: 5' 2" (1.575 m)       No results found for this or any previous visit (from the past 24 hour(s)).      Physical Exam  Vitals signs reviewed.   Constitutional:       General: She is not in acute distress.     Appearance: She is well-developed.   HENT:      Head: Normocephalic and atraumatic.   Eyes:      Pupils: Pupils are equal, round, and reactive to light.   Neck:      Musculoskeletal: Normal range of motion and neck supple.      Vascular: No JVD.   Cardiovascular:      Rate and Rhythm: Normal rate and regular rhythm.      Heart sounds: Normal heart sounds. No murmur.   Pulmonary:      Effort: Pulmonary effort is normal. No respiratory distress.      Breath sounds: Normal breath sounds. No wheezing or rales.   Abdominal:      General: Bowel sounds are normal. There is no distension.      Palpations: Abdomen is soft.      Tenderness: There is no abdominal tenderness.   Musculoskeletal: Normal range of motion.         General: No deformity.      Comments: Pain with extension and flexion of the 1st right digit  Exquisite tenderness to palpation of the extensor tendon of R 1st digit   Skin:     General: Skin is warm.   Neurological:      Mental Status: She is alert and oriented to person, place, and time.           ASSESSMENT & PLAN:      55 y.o. female w/ PMhx of Anxiety, depression, asthma, T2DM, HTN, tenosinovitis of Quervain, presenting to the Urgent Care due to R sided wrist pain.    De Quervain's tenosynovitis   Patient presents with another episode of De Quervain's tenosynovitis which she has had for several years. Last seen by ortho in 02/20 where she had a steroid injection which worked until now. Currently pain is limiting her activities.         -     I suggested to follow-up with Orthopedics (pt will make appointment). In the meantime, will try conservative measures:        -     Wrist Splint with thumb spica.  -     diclofenac sodium (VOLTAREN) 1 % Gel; " Apply 2 g topically 2 (two) times daily.  -     meloxicam (MOBIC) 15 MG tablet; Take 1 tablet (15 mg total) by mouth once daily.        -     Encouraged pt to establish care with a PCP.      Discussed with Dr. Vaughn PEREZ as needed    Roman Betts MD  Internal Medicine PGY-2  [phone]

## 2020-11-06 NOTE — PROGRESS NOTES
Reviewed patient's medical record in Epic. Patient's history and physical reviewed and discussed, please refer to resident physician's note for specific details. I agree with resident's assessment and plan.    Gaston Mendes MD

## 2020-12-10 ENCOUNTER — TELEPHONE (OUTPATIENT)
Dept: BARIATRICS | Facility: CLINIC | Age: 55
End: 2020-12-10

## 2020-12-10 NOTE — TELEPHONE ENCOUNTER
Patient stated she is interested in Bariatric surgery.  SANYA and dietitian appointments scheduled.  Patient aware of date/time/location of [facility] consult appointment and is familiar with instructions for virtual appt with dietitian, Jessica Moore.  Instructions given to review seminar prior to consult appt

## 2020-12-10 NOTE — TELEPHONE ENCOUNTER
----- Message from Silvia Anthony sent at 12/9/2020  5:47 PM CST -----  Regarding: Appointment  Contact: [phone]  Pt called in to schedule appointment. Pt can be reached at [phone]

## 2020-12-28 ENCOUNTER — OFFICE VISIT (OUTPATIENT)
Dept: INTERNAL MEDICINE | Facility: CLINIC | Age: 55
End: 2020-12-28
Attending: INTERNAL MEDICINE
Payer: MEDICARE

## 2020-12-28 VITALS
OXYGEN SATURATION: 99 % | DIASTOLIC BLOOD PRESSURE: 76 MMHG | SYSTOLIC BLOOD PRESSURE: 114 MMHG | BODY MASS INDEX: 45.68 KG/M2 | WEIGHT: 248.25 LBS | HEART RATE: 87 BPM | HEIGHT: 62 IN

## 2020-12-28 DIAGNOSIS — E66.01 MORBID OBESITY DUE TO EXCESS CALORIES: ICD-10-CM

## 2020-12-28 DIAGNOSIS — Z12.11 SCREEN FOR COLON CANCER: ICD-10-CM

## 2020-12-28 DIAGNOSIS — E11.59 HYPERTENSION ASSOCIATED WITH DIABETES: ICD-10-CM

## 2020-12-28 DIAGNOSIS — I15.2 HYPERTENSION ASSOCIATED WITH DIABETES: ICD-10-CM

## 2020-12-28 DIAGNOSIS — E11.69 HYPERLIPIDEMIA ASSOCIATED WITH TYPE 2 DIABETES MELLITUS: ICD-10-CM

## 2020-12-28 DIAGNOSIS — Z23 NEED FOR INFLUENZA VACCINATION: ICD-10-CM

## 2020-12-28 DIAGNOSIS — E78.5 HYPERLIPIDEMIA ASSOCIATED WITH TYPE 2 DIABETES MELLITUS: ICD-10-CM

## 2020-12-28 DIAGNOSIS — E66.01 MORBID OBESITY: ICD-10-CM

## 2020-12-28 DIAGNOSIS — R60.1 GENERALIZED EDEMA: Primary | ICD-10-CM

## 2020-12-28 DIAGNOSIS — E11.8 TYPE 2 DIABETES MELLITUS WITH COMPLICATION, WITHOUT LONG-TERM CURRENT USE OF INSULIN: ICD-10-CM

## 2020-12-28 PROCEDURE — 1125F AMNT PAIN NOTED PAIN PRSNT: CPT | Mod: HCNC,S$GLB,, | Performed by: FAMILY MEDICINE

## 2020-12-28 PROCEDURE — 99999 PR PBB SHADOW E&M-EST. PATIENT-LVL IV: ICD-10-PCS | Mod: PBBFAC,HCNC,, | Performed by: FAMILY MEDICINE

## 2020-12-28 PROCEDURE — 99214 OFFICE O/P EST MOD 30 MIN: CPT | Mod: HCNC,S$GLB,, | Performed by: FAMILY MEDICINE

## 2020-12-28 PROCEDURE — 3044F HG A1C LEVEL LT 7.0%: CPT | Mod: HCNC,CPTII,S$GLB, | Performed by: FAMILY MEDICINE

## 2020-12-28 PROCEDURE — 3074F PR MOST RECENT SYSTOLIC BLOOD PRESSURE < 130 MM HG: ICD-10-PCS | Mod: HCNC,CPTII,S$GLB, | Performed by: FAMILY MEDICINE

## 2020-12-28 PROCEDURE — 3072F LOW RISK FOR RETINOPATHY: CPT | Mod: HCNC,S$GLB,, | Performed by: FAMILY MEDICINE

## 2020-12-28 PROCEDURE — 99499 UNLISTED E&M SERVICE: CPT | Mod: S$GLB,,, | Performed by: FAMILY MEDICINE

## 2020-12-28 PROCEDURE — 99499 RISK ADDL DX/OHS AUDIT: ICD-10-PCS | Mod: S$GLB,,, | Performed by: FAMILY MEDICINE

## 2020-12-28 PROCEDURE — 99214 PR OFFICE/OUTPT VISIT, EST, LEVL IV, 30-39 MIN: ICD-10-PCS | Mod: HCNC,S$GLB,, | Performed by: FAMILY MEDICINE

## 2020-12-28 PROCEDURE — 3008F BODY MASS INDEX DOCD: CPT | Mod: HCNC,CPTII,S$GLB, | Performed by: FAMILY MEDICINE

## 2020-12-28 PROCEDURE — 3078F PR MOST RECENT DIASTOLIC BLOOD PRESSURE < 80 MM HG: ICD-10-PCS | Mod: HCNC,CPTII,S$GLB, | Performed by: FAMILY MEDICINE

## 2020-12-28 PROCEDURE — 3044F PR MOST RECENT HEMOGLOBIN A1C LEVEL <7.0%: ICD-10-PCS | Mod: HCNC,CPTII,S$GLB, | Performed by: FAMILY MEDICINE

## 2020-12-28 PROCEDURE — 3078F DIAST BP <80 MM HG: CPT | Mod: HCNC,CPTII,S$GLB, | Performed by: FAMILY MEDICINE

## 2020-12-28 PROCEDURE — 3074F SYST BP LT 130 MM HG: CPT | Mod: HCNC,CPTII,S$GLB, | Performed by: FAMILY MEDICINE

## 2020-12-28 PROCEDURE — 1125F PR PAIN SEVERITY QUANTIFIED, PAIN PRESENT: ICD-10-PCS | Mod: HCNC,S$GLB,, | Performed by: FAMILY MEDICINE

## 2020-12-28 PROCEDURE — 3072F PR LOW RISK FOR RETINOPATHY: ICD-10-PCS | Mod: HCNC,S$GLB,, | Performed by: FAMILY MEDICINE

## 2020-12-28 PROCEDURE — 99999 PR PBB SHADOW E&M-EST. PATIENT-LVL IV: CPT | Mod: PBBFAC,HCNC,, | Performed by: FAMILY MEDICINE

## 2020-12-28 PROCEDURE — 3008F PR BODY MASS INDEX (BMI) DOCUMENTED: ICD-10-PCS | Mod: HCNC,CPTII,S$GLB, | Performed by: FAMILY MEDICINE

## 2020-12-28 RX ORDER — ATORVASTATIN CALCIUM 40 MG/1
40 TABLET, FILM COATED ORAL DAILY
Qty: 90 TABLET | Refills: 3 | Status: SHIPPED | OUTPATIENT
Start: 2020-12-28 | End: 2023-06-13

## 2020-12-28 RX ORDER — TOPIRAMATE 50 MG/1
50 TABLET, FILM COATED ORAL 2 TIMES DAILY
Qty: 180 TABLET | Refills: 3 | Status: SHIPPED | OUTPATIENT
Start: 2020-12-28 | End: 2023-06-13

## 2020-12-28 RX ORDER — HYDROCHLOROTHIAZIDE 12.5 MG/1
12.5 CAPSULE ORAL DAILY
Qty: 30 CAPSULE | Refills: 11 | Status: SHIPPED | OUTPATIENT
Start: 2020-12-28 | End: 2023-06-27

## 2020-12-28 RX ORDER — METFORMIN HYDROCHLORIDE 500 MG/1
500 TABLET, EXTENDED RELEASE ORAL 2 TIMES DAILY WITH MEALS
Qty: 180 TABLET | Refills: 3 | Status: SHIPPED | OUTPATIENT
Start: 2020-12-28 | End: 2023-06-27

## 2020-12-28 NOTE — PROGRESS NOTES
Subjective:       Patient ID: Dai Roper is a 55 y.o. female.    Chief Complaint:   Establish Care, Medication Refill, and Leg Swelling    New pt to me.  She's been out of metformin, atorvastatin, and topiramate for 3 months.    Diabetes  She presents for her initial diabetic visit. She has type 2 diabetes mellitus. Her disease course has been stable. Pertinent negatives for diabetes include no chest pain, no polydipsia, no polyphagia and no polyuria. There are no hypoglycemic complications. Symptoms are stable. There are no diabetic complications.   Her am fasting glucose is usually ~ 110.  Last A1C 5.6 on 6/10.  She is going to schedule an appointment with her eye doctor.  She states she was taking a blood pressure medication that had hctz 12.5 mg in it.  It was stopped b/c her bp was so good.  Since she stopped taking it she feels like she has been retaining fluid.  She's worried about her weight.  Has met with a deitician and bariatric surgeon.  Wants referral to dietician again.  Review of Systems   Constitutional: Negative for activity change, appetite change and fever.   Respiratory: Negative for cough and shortness of breath.    Cardiovascular: Negative for chest pain.   Endocrine: Negative for polydipsia, polyphagia and polyuria.     Current Outpatient Medications   Medication Sig    albuterol (PROVENTIL) 2.5 mg /3 mL (0.083 %) nebulizer solution Take 3 mLs (2.5 mg total) by nebulization every 6 (six) hours as needed for Wheezing.    aspirin 81 MG Chew Take 1 tablet (81 mg total) by mouth once daily.    atorvastatin (LIPITOR) 40 MG tablet Take 1 tablet (40 mg total) by mouth once daily.    cholecalciferol, vitamin D3, (VITAMIN D3) 5,000 unit Tab Take 5,000 Units by mouth once daily.    diclofenac sodium (VOLTAREN) 1 % Gel Apply 2 g topically 2 (two) times daily.    fluticasone (FLOVENT HFA) 220 mcg/actuation inhaler Inhale 1 puff into the lungs 2 (two) times daily.    gabapentin (NEURONTIN)  300 MG capsule Take 1 capsule (300 mg total) by mouth 3 (three) times daily.    ibuprofen (ADVIL,MOTRIN) 800 MG tablet Take 1 tablet (800 mg total) by mouth every 6 (six) hours as needed for Pain.    metFORMIN (GLUCOPHAGE-XR) 500 MG ER 24hr tablet Take 1 tablet (500 mg total) by mouth 2 (two) times daily with meals.    topiramate (TOPAMAX) 50 MG tablet TAKE ONE TABLET BY MOUTH TWICE A DAY    blood sugar diagnostic (ACCU-CHEK BAKARI) Strp 1 strip by Misc.(Non-Drug; Combo Route) route once daily.    blood-glucose meter (ACCU-CHEK BAKARI CONNECT METER) Misc 1 Units by Misc.(Non-Drug; Combo Route) route once daily.    DULoxetine (CYMBALTA) 30 MG capsule Take 1 capsule (30 mg total) by mouth once daily. (Patient not taking: Reported on 12/28/2020)    hydroCHLOROthiazide (MICROZIDE) 12.5 mg capsule Take 1 capsule (12.5 mg total) by mouth once daily.    ketoconazole (NIZORAL) 2 % cream Apply topically once daily. (Patient not taking: Reported on 12/28/2020)    meloxicam (MOBIC) 15 MG tablet Take 1 tablet (15 mg total) by mouth once daily. (Patient not taking: Reported on 12/28/2020)     No current facility-administered medications for this visit.      Past Medical History:   Diagnosis Date    Anxiety     Asthma     Depression     Diabetes mellitus     DVT (deep venous thrombosis)     Hx of psychiatric care     Hypertension     Psychiatric problem     Sleep difficulties      Family History   Problem Relation Age of Onset    Diabetes Mother     Heart disease Mother     Hypertension Mother     Lupus Mother     Depression Mother     Anxiety disorder Mother     Heart disease Father     Hypertension Father     Hypertension Sister     Lupus Sister     No Known Problems Brother     Anxiety disorder Daughter     Depression Daughter     Depression Son     Anxiety disorder Son     Hypertension Sister     Hypertension Sister     Breast cancer Maternal Aunt      Social History     Tobacco Use     "Smoking status: Never Smoker    Smokeless tobacco: Never Used   Substance Use Topics    Alcohol use: No    Drug use: No       Objective:      Vitals:    12/28/20 1445   BP: 114/76   BP Location: Left arm   Patient Position: Sitting   Pulse: 87   SpO2: 99%   Weight: 112.6 kg (248 lb 3.8 oz)   Height: 5' 2" (1.575 m)     Physical Exam  Vitals signs and nursing note reviewed.   Constitutional:       General: She is not in acute distress.     Appearance: She is well-developed. She is not diaphoretic.   HENT:      Head: Normocephalic and atraumatic.   Eyes:      Conjunctiva/sclera: Conjunctivae normal.   Neck:      Musculoskeletal: Normal range of motion and neck supple.   Cardiovascular:      Rate and Rhythm: Normal rate and regular rhythm.      Heart sounds: Normal heart sounds. No murmur. No friction rub. No gallop.    Pulmonary:      Effort: Pulmonary effort is normal.      Breath sounds: Normal breath sounds. No wheezing or rales.   Musculoskeletal:         General: No deformity.   Skin:     General: Skin is warm and dry.   Neurological:      Mental Status: She is alert and oriented to person, place, and time.   Psychiatric:         Behavior: Behavior normal.              Assessment and Plan:     Generalized edema  -     hydroCHLOROthiazide (MICROZIDE) 12.5 mg capsule; Take 1 capsule (12.5 mg total) by mouth once daily.  Dispense: 30 capsule; Refill: 11    Hyperlipidemia associated with type 2 diabetes mellitus  -     atorvastatin (LIPITOR) 40 MG tablet; Take 1 tablet (40 mg total) by mouth once daily.  Dispense: 90 tablet; Refill: 3    Type 2 diabetes mellitus with complication, without long-term current use of insulin  -     metFORMIN (GLUCOPHAGE-XR) 500 MG ER 24hr tablet; Take 1 tablet (500 mg total) by mouth 2 (two) times daily with meals.  Dispense: 180 tablet; Refill: 3  -     Ambulatory referral/consult to Nutrition Services; Future; Expected date: 01/04/2021    Morbid obesity due to excess calories  -     " metFORMIN (GLUCOPHAGE-XR) 500 MG ER 24hr tablet; Take 1 tablet (500 mg total) by mouth 2 (two) times daily with meals.  Dispense: 180 tablet; Refill: 3  -     topiramate (TOPAMAX) 50 MG tablet; TAKE ONE TABLET BY MOUTH TWICE A DAY  Dispense: 180 tablet; Refill: 3    Hypertension associated with diabetes  -     metFORMIN (GLUCOPHAGE-XR) 500 MG ER 24hr tablet; Take 1 tablet (500 mg total) by mouth 2 (two) times daily with meals.  Dispense: 180 tablet; Refill: 3    Need for influenza vaccination    Screen for colon cancer  -     Case Request Endoscopy: COLONOSCOPY    Morbid obesity          Follow up in about 3 months (around 3/28/2021) for diabetes, hyperlipidemia.      Silvestre Boone MD

## 2020-12-29 ENCOUNTER — HOSPITAL ENCOUNTER (OUTPATIENT)
Dept: RADIOLOGY | Facility: HOSPITAL | Age: 55
Discharge: HOME | End: 2020-12-29
Attending: NURSE PRACTITIONER
Payer: MEDICARE

## 2020-12-29 DIAGNOSIS — Z11.4 ENCOUNTER FOR SCREENING FOR HUMAN IMMUNODEFICIENCY VIRUS (HIV): ICD-10-CM

## 2020-12-29 DIAGNOSIS — E11.69 HYPERLIPIDEMIA ASSOCIATED WITH TYPE 2 DIABETES MELLITUS: ICD-10-CM

## 2020-12-29 DIAGNOSIS — E78.5 HYPERLIPIDEMIA ASSOCIATED WITH TYPE 2 DIABETES MELLITUS: ICD-10-CM

## 2020-12-29 DIAGNOSIS — R92.8 OTHER ABNORMAL AND INCONCLUSIVE FINDINGS ON DIAGNOSTIC IMAGING OF BREAST: ICD-10-CM

## 2020-12-29 DIAGNOSIS — Z00.00 PREVENTATIVE HEALTH CARE: ICD-10-CM

## 2020-12-29 DIAGNOSIS — Z12.31 SCREENING MAMMOGRAM FOR HIGH-RISK PATIENT: ICD-10-CM

## 2020-12-29 PROCEDURE — 77063 MAMMO DIGITAL SCREENING BILAT WITH TOMO: ICD-10-PCS | Mod: 26,HCNC,, | Performed by: RADIOLOGY

## 2020-12-29 PROCEDURE — 77067 SCR MAMMO BI INCL CAD: CPT | Mod: 26,HCNC,, | Performed by: RADIOLOGY

## 2020-12-29 PROCEDURE — 77067 SCR MAMMO BI INCL CAD: CPT | Mod: TC,HCNC

## 2020-12-29 PROCEDURE — 77063 BREAST TOMOSYNTHESIS BI: CPT | Mod: 26,HCNC,, | Performed by: RADIOLOGY

## 2020-12-29 PROCEDURE — 77067 MAMMO DIGITAL SCREENING BILAT WITH TOMO: ICD-10-PCS | Mod: 26,HCNC,, | Performed by: RADIOLOGY

## 2021-01-08 ENCOUNTER — TELEPHONE (OUTPATIENT)
Dept: BARIATRICS | Facility: CLINIC | Age: 56
End: 2021-01-08

## 2021-02-18 ENCOUNTER — OFFICE VISIT (OUTPATIENT)
Dept: PODIATRY | Facility: CLINIC | Age: 56
End: 2021-02-18
Payer: MEDICARE

## 2021-02-18 VITALS
HEART RATE: 79 BPM | DIASTOLIC BLOOD PRESSURE: 85 MMHG | WEIGHT: 251.75 LBS | SYSTOLIC BLOOD PRESSURE: 126 MMHG | HEIGHT: 62 IN | BODY MASS INDEX: 46.33 KG/M2

## 2021-02-18 DIAGNOSIS — B35.1 ONYCHOMYCOSIS DUE TO DERMATOPHYTE: ICD-10-CM

## 2021-02-18 DIAGNOSIS — E11.42 DIABETIC POLYNEUROPATHY ASSOCIATED WITH TYPE 2 DIABETES MELLITUS: Primary | ICD-10-CM

## 2021-02-18 PROCEDURE — 3074F PR MOST RECENT SYSTOLIC BLOOD PRESSURE < 130 MM HG: ICD-10-PCS | Mod: CPTII,S$GLB,, | Performed by: PODIATRIST

## 2021-02-18 PROCEDURE — 3008F PR BODY MASS INDEX (BMI) DOCUMENTED: ICD-10-PCS | Mod: CPTII,S$GLB,, | Performed by: PODIATRIST

## 2021-02-18 PROCEDURE — 99213 OFFICE O/P EST LOW 20 MIN: CPT | Mod: 25,S$GLB,, | Performed by: PODIATRIST

## 2021-02-18 PROCEDURE — 3079F PR MOST RECENT DIASTOLIC BLOOD PRESSURE 80-89 MM HG: ICD-10-PCS | Mod: CPTII,S$GLB,, | Performed by: PODIATRIST

## 2021-02-18 PROCEDURE — 11721 PR DEBRIDEMENT OF NAILS, 6 OR MORE: ICD-10-PCS | Mod: Q9,S$GLB,, | Performed by: PODIATRIST

## 2021-02-18 PROCEDURE — 3008F BODY MASS INDEX DOCD: CPT | Mod: CPTII,S$GLB,, | Performed by: PODIATRIST

## 2021-02-18 PROCEDURE — 3074F SYST BP LT 130 MM HG: CPT | Mod: CPTII,S$GLB,, | Performed by: PODIATRIST

## 2021-02-18 PROCEDURE — 3079F DIAST BP 80-89 MM HG: CPT | Mod: CPTII,S$GLB,, | Performed by: PODIATRIST

## 2021-02-18 PROCEDURE — 3044F HG A1C LEVEL LT 7.0%: CPT | Mod: CPTII,S$GLB,, | Performed by: PODIATRIST

## 2021-02-18 PROCEDURE — 11721 DEBRIDE NAIL 6 OR MORE: CPT | Mod: Q9,S$GLB,, | Performed by: PODIATRIST

## 2021-02-18 PROCEDURE — 1125F AMNT PAIN NOTED PAIN PRSNT: CPT | Mod: S$GLB,,, | Performed by: PODIATRIST

## 2021-02-18 PROCEDURE — 99999 PR PBB SHADOW E&M-EST. PATIENT-LVL III: ICD-10-PCS | Mod: PBBFAC,,, | Performed by: PODIATRIST

## 2021-02-18 PROCEDURE — 3044F PR MOST RECENT HEMOGLOBIN A1C LEVEL <7.0%: ICD-10-PCS | Mod: CPTII,S$GLB,, | Performed by: PODIATRIST

## 2021-02-18 PROCEDURE — 99999 PR PBB SHADOW E&M-EST. PATIENT-LVL III: CPT | Mod: PBBFAC,,, | Performed by: PODIATRIST

## 2021-02-18 PROCEDURE — 1125F PR PAIN SEVERITY QUANTIFIED, PAIN PRESENT: ICD-10-PCS | Mod: S$GLB,,, | Performed by: PODIATRIST

## 2021-02-18 PROCEDURE — 99213 PR OFFICE/OUTPT VISIT, EST, LEVL III, 20-29 MIN: ICD-10-PCS | Mod: 25,S$GLB,, | Performed by: PODIATRIST

## 2021-03-20 ENCOUNTER — PATIENT MESSAGE (OUTPATIENT)
Dept: ENDOSCOPY | Facility: HOSPITAL | Age: 56
End: 2021-03-20

## 2021-05-04 ENCOUNTER — PATIENT MESSAGE (OUTPATIENT)
Dept: RESEARCH | Facility: HOSPITAL | Age: 56
End: 2021-05-04

## 2021-05-26 ENCOUNTER — OFFICE VISIT (OUTPATIENT)
Dept: OPTOMETRY | Facility: CLINIC | Age: 56
End: 2021-05-26
Payer: MEDICARE

## 2021-05-26 DIAGNOSIS — H04.123 DRY EYE SYNDROME OF BOTH EYES: ICD-10-CM

## 2021-05-26 DIAGNOSIS — H52.4 HYPEROPIA WITH ASTIGMATISM AND PRESBYOPIA, BILATERAL: ICD-10-CM

## 2021-05-26 DIAGNOSIS — E11.9 TYPE 2 DIABETES MELLITUS WITHOUT RETINOPATHY: Primary | ICD-10-CM

## 2021-05-26 DIAGNOSIS — H52.03 HYPEROPIA WITH ASTIGMATISM AND PRESBYOPIA, BILATERAL: ICD-10-CM

## 2021-05-26 DIAGNOSIS — H52.203 HYPEROPIA WITH ASTIGMATISM AND PRESBYOPIA, BILATERAL: ICD-10-CM

## 2021-05-26 DIAGNOSIS — H25.13 NUCLEAR SCLEROTIC CATARACT OF BOTH EYES: ICD-10-CM

## 2021-05-26 PROCEDURE — 2023F PR DILATED RETINAL EXAM W/O EVID OF RETINOPATHY: ICD-10-PCS | Mod: S$GLB,,, | Performed by: OPTOMETRIST

## 2021-05-26 PROCEDURE — 1126F AMNT PAIN NOTED NONE PRSNT: CPT | Mod: S$GLB,,, | Performed by: OPTOMETRIST

## 2021-05-26 PROCEDURE — 99499 RISK ADDL DX/OHS AUDIT: ICD-10-PCS | Mod: S$GLB,,, | Performed by: OPTOMETRIST

## 2021-05-26 PROCEDURE — 92014 PR EYE EXAM, EST PATIENT,COMPREHESV: ICD-10-PCS | Mod: S$GLB,,, | Performed by: OPTOMETRIST

## 2021-05-26 PROCEDURE — 2023F DILAT RTA XM W/O RTNOPTHY: CPT | Mod: S$GLB,,, | Performed by: OPTOMETRIST

## 2021-05-26 PROCEDURE — 92015 PR REFRACTION: ICD-10-PCS | Mod: S$GLB,,, | Performed by: OPTOMETRIST

## 2021-05-26 PROCEDURE — 92015 DETERMINE REFRACTIVE STATE: CPT | Mod: S$GLB,,, | Performed by: OPTOMETRIST

## 2021-05-26 PROCEDURE — 99499 UNLISTED E&M SERVICE: CPT | Mod: S$GLB,,, | Performed by: OPTOMETRIST

## 2021-05-26 PROCEDURE — 99999 PR PBB SHADOW E&M-EST. PATIENT-LVL III: CPT | Mod: PBBFAC,,, | Performed by: OPTOMETRIST

## 2021-05-26 PROCEDURE — 1126F PR PAIN SEVERITY QUANTIFIED, NO PAIN PRESENT: ICD-10-PCS | Mod: S$GLB,,, | Performed by: OPTOMETRIST

## 2021-05-26 PROCEDURE — 99999 PR PBB SHADOW E&M-EST. PATIENT-LVL III: ICD-10-PCS | Mod: PBBFAC,,, | Performed by: OPTOMETRIST

## 2021-05-26 PROCEDURE — 92014 COMPRE OPH EXAM EST PT 1/>: CPT | Mod: S$GLB,,, | Performed by: OPTOMETRIST

## 2021-08-03 ENCOUNTER — TELEPHONE (OUTPATIENT)
Dept: BARIATRICS | Facility: CLINIC | Age: 56
End: 2021-08-03
Payer: MEDICARE

## 2021-08-06 ENCOUNTER — OFFICE VISIT (OUTPATIENT)
Dept: PODIATRY | Facility: CLINIC | Age: 56
End: 2021-08-06
Payer: MEDICARE

## 2021-08-06 VITALS
WEIGHT: 256.38 LBS | BODY MASS INDEX: 46.9 KG/M2 | SYSTOLIC BLOOD PRESSURE: 115 MMHG | HEART RATE: 80 BPM | DIASTOLIC BLOOD PRESSURE: 84 MMHG

## 2021-08-06 DIAGNOSIS — B35.1 ONYCHOMYCOSIS DUE TO DERMATOPHYTE: ICD-10-CM

## 2021-08-06 DIAGNOSIS — M72.2 PLANTAR FASCIITIS: ICD-10-CM

## 2021-08-06 DIAGNOSIS — E11.42 DIABETIC POLYNEUROPATHY ASSOCIATED WITH TYPE 2 DIABETES MELLITUS: Primary | ICD-10-CM

## 2021-08-06 PROCEDURE — 99214 PR OFFICE/OUTPT VISIT, EST, LEVL IV, 30-39 MIN: ICD-10-PCS | Mod: 25,S$GLB,, | Performed by: PODIATRIST

## 2021-08-06 PROCEDURE — 3008F BODY MASS INDEX DOCD: CPT | Mod: CPTII,S$GLB,, | Performed by: PODIATRIST

## 2021-08-06 PROCEDURE — 20550 PR INJECT TENDON SHEATH/LIGAMENT: ICD-10-PCS | Mod: LT,S$GLB,, | Performed by: PODIATRIST

## 2021-08-06 PROCEDURE — 11721 PR DEBRIDEMENT OF NAILS, 6 OR MORE: ICD-10-PCS | Mod: 59,Q9,S$GLB, | Performed by: PODIATRIST

## 2021-08-06 PROCEDURE — 3079F PR MOST RECENT DIASTOLIC BLOOD PRESSURE 80-89 MM HG: ICD-10-PCS | Mod: CPTII,S$GLB,, | Performed by: PODIATRIST

## 2021-08-06 PROCEDURE — 3074F SYST BP LT 130 MM HG: CPT | Mod: CPTII,S$GLB,, | Performed by: PODIATRIST

## 2021-08-06 PROCEDURE — 1159F PR MEDICATION LIST DOCUMENTED IN MEDICAL RECORD: ICD-10-PCS | Mod: CPTII,S$GLB,, | Performed by: PODIATRIST

## 2021-08-06 PROCEDURE — 99999 PR PBB SHADOW E&M-EST. PATIENT-LVL III: ICD-10-PCS | Mod: PBBFAC,,, | Performed by: PODIATRIST

## 2021-08-06 PROCEDURE — 3008F PR BODY MASS INDEX (BMI) DOCUMENTED: ICD-10-PCS | Mod: CPTII,S$GLB,, | Performed by: PODIATRIST

## 2021-08-06 PROCEDURE — 99214 OFFICE O/P EST MOD 30 MIN: CPT | Mod: 25,S$GLB,, | Performed by: PODIATRIST

## 2021-08-06 PROCEDURE — 11721 DEBRIDE NAIL 6 OR MORE: CPT | Mod: 59,Q9,S$GLB, | Performed by: PODIATRIST

## 2021-08-06 PROCEDURE — 1159F MED LIST DOCD IN RCRD: CPT | Mod: CPTII,S$GLB,, | Performed by: PODIATRIST

## 2021-08-06 PROCEDURE — 20550 NJX 1 TENDON SHEATH/LIGAMENT: CPT | Mod: LT,S$GLB,, | Performed by: PODIATRIST

## 2021-08-06 PROCEDURE — 3074F PR MOST RECENT SYSTOLIC BLOOD PRESSURE < 130 MM HG: ICD-10-PCS | Mod: CPTII,S$GLB,, | Performed by: PODIATRIST

## 2021-08-06 PROCEDURE — 3079F DIAST BP 80-89 MM HG: CPT | Mod: CPTII,S$GLB,, | Performed by: PODIATRIST

## 2021-08-06 PROCEDURE — 99999 PR PBB SHADOW E&M-EST. PATIENT-LVL III: CPT | Mod: PBBFAC,,, | Performed by: PODIATRIST

## 2021-08-06 RX ORDER — TRIAMCINOLONE ACETONIDE 40 MG/ML
40 INJECTION, SUSPENSION INTRA-ARTICULAR; INTRAMUSCULAR
Status: COMPLETED | OUTPATIENT
Start: 2021-08-06 | End: 2021-08-06

## 2021-08-06 RX ORDER — DEXAMETHASONE SODIUM PHOSPHATE 4 MG/ML
4 INJECTION, SOLUTION INTRA-ARTICULAR; INTRALESIONAL; INTRAMUSCULAR; INTRAVENOUS; SOFT TISSUE ONCE
Status: COMPLETED | OUTPATIENT
Start: 2021-08-06 | End: 2021-08-06

## 2021-08-06 RX ADMIN — DEXAMETHASONE SODIUM PHOSPHATE 4 MG: 4 INJECTION, SOLUTION INTRA-ARTICULAR; INTRALESIONAL; INTRAMUSCULAR; INTRAVENOUS; SOFT TISSUE at 08:08

## 2021-08-06 RX ADMIN — TRIAMCINOLONE ACETONIDE 40 MG: 40 INJECTION, SUSPENSION INTRA-ARTICULAR; INTRAMUSCULAR at 08:08

## 2021-09-14 ENCOUNTER — TELEPHONE (OUTPATIENT)
Dept: BARIATRICS | Facility: CLINIC | Age: 56
End: 2021-09-14

## 2021-09-15 ENCOUNTER — TELEPHONE (OUTPATIENT)
Dept: BARIATRICS | Facility: CLINIC | Age: 56
End: 2021-09-15

## 2021-09-17 NOTE — TELEPHONE ENCOUNTER
----- Message from Tosha Wallace sent at 3/14/2018  8:48 AM CDT -----  Contact: self  Patient states have knot under lid of eye.   Pt states experiencing pain and eye is draining.   Patient need appointment for this morning.   Please call pt at [phone]   Use caution with Q-tips when cleaning your ears.  Only use them to clean the outer portions of your ear and not in the ear canal itself.  You may use the Debrox drops to help dissolve it over the next couple of nights.  Apply 10 drops to your left ear and sleep on your right side.  This should help dissolve the earwax over the next couple of nights..  Follow-up with your passport provider for a surgical referral to discuss with them your inguinal hernia.  Also you may have them give you a your nose and throat referral concerning her possible nasal fracture and to ensure it heals properly.  Return to the emergency department for any acutely developing abdominal pain, persistent vomiting, or any new or worse concerns.

## 2021-11-08 ENCOUNTER — PES CALL (OUTPATIENT)
Dept: ADMINISTRATIVE | Facility: CLINIC | Age: 56
End: 2021-11-08
Payer: MEDICARE

## 2021-12-28 ENCOUNTER — TELEPHONE (OUTPATIENT)
Dept: FAMILY MEDICINE | Facility: CLINIC | Age: 56
End: 2021-12-28
Payer: MEDICARE

## 2022-03-28 NOTE — ASSESSMENT & PLAN NOTE
GFR >60, A1c 6 in 1/2017  · Avoid nephrotoxic meds  · Advised to switch from ibuprofen to tylenol  · Continue DM and HTN control   Never

## 2022-04-04 NOTE — TELEPHONE ENCOUNTER
Patient is going to try and get a ride. Pt states cant drive. Informed her she needs to go to the ED. Please let Ms Roper know that her labs look great! Her A1c is well-controlled (at 5.6), vitamin D at a good level, kidney function good, thyroid function good.Her urine did not show any protein, which is great!    Her cholesterol was not well-controlled, but that will improve with her statin.    Great job all around! How is her pain after the toradol injection.    KJ

## 2023-05-16 ENCOUNTER — TELEPHONE (OUTPATIENT)
Dept: INTERNAL MEDICINE | Facility: CLINIC | Age: 58
End: 2023-05-16
Payer: MEDICARE

## 2023-05-16 NOTE — TELEPHONE ENCOUNTER
Called pt pt did not answer unable to leave VM due to mailbox being full. Pt will have to schedule an appt to have mammo order put in. Pt has not been seen by JASPAL An since 2020. Pt will need to get in and establish care with an available provider taking new patients.

## 2023-05-16 NOTE — TELEPHONE ENCOUNTER
----- Message from Yaw Klein sent at 5/16/2023 11:05 AM CDT -----  Regarding: mammo orders  Contact: pt @ [phone]  Pt is calling report pain in breast with marker. Pt is needing orders for mammo; please call to advise further, thank you yelitza hill you are doing.

## 2023-06-13 ENCOUNTER — OFFICE VISIT (OUTPATIENT)
Dept: INTERNAL MEDICINE | Facility: CLINIC | Age: 58
End: 2023-06-13
Payer: MEDICARE

## 2023-06-13 VITALS
OXYGEN SATURATION: 97 % | TEMPERATURE: 98 F | SYSTOLIC BLOOD PRESSURE: 120 MMHG | WEIGHT: 218 LBS | HEIGHT: 60 IN | HEART RATE: 75 BPM | BODY MASS INDEX: 42.8 KG/M2 | DIASTOLIC BLOOD PRESSURE: 82 MMHG

## 2023-06-13 DIAGNOSIS — R05.9 COUGH IN ADULT: ICD-10-CM

## 2023-06-13 DIAGNOSIS — R09.82 POSTNASAL DRIP: ICD-10-CM

## 2023-06-13 DIAGNOSIS — J01.40 ACUTE NON-RECURRENT PANSINUSITIS: Primary | ICD-10-CM

## 2023-06-13 DIAGNOSIS — E66.01 MORBIDLY OBESE: ICD-10-CM

## 2023-06-13 PROCEDURE — 3008F BODY MASS INDEX DOCD: CPT | Mod: CPTII,S$GLB,, | Performed by: NURSE PRACTITIONER

## 2023-06-13 PROCEDURE — 99214 PR OFFICE/OUTPT VISIT, EST, LEVL IV, 30-39 MIN: ICD-10-PCS | Mod: S$GLB,,, | Performed by: NURSE PRACTITIONER

## 2023-06-13 PROCEDURE — 99214 OFFICE O/P EST MOD 30 MIN: CPT | Mod: S$GLB,,, | Performed by: NURSE PRACTITIONER

## 2023-06-13 PROCEDURE — 1160F PR REVIEW ALL MEDS BY PRESCRIBER/CLIN PHARMACIST DOCUMENTED: ICD-10-PCS | Mod: CPTII,S$GLB,, | Performed by: NURSE PRACTITIONER

## 2023-06-13 PROCEDURE — 1159F PR MEDICATION LIST DOCUMENTED IN MEDICAL RECORD: ICD-10-PCS | Mod: CPTII,S$GLB,, | Performed by: NURSE PRACTITIONER

## 2023-06-13 PROCEDURE — 1160F RVW MEDS BY RX/DR IN RCRD: CPT | Mod: CPTII,S$GLB,, | Performed by: NURSE PRACTITIONER

## 2023-06-13 PROCEDURE — 3079F PR MOST RECENT DIASTOLIC BLOOD PRESSURE 80-89 MM HG: ICD-10-PCS | Mod: CPTII,S$GLB,, | Performed by: NURSE PRACTITIONER

## 2023-06-13 PROCEDURE — 3008F PR BODY MASS INDEX (BMI) DOCUMENTED: ICD-10-PCS | Mod: CPTII,S$GLB,, | Performed by: NURSE PRACTITIONER

## 2023-06-13 PROCEDURE — 3079F DIAST BP 80-89 MM HG: CPT | Mod: CPTII,S$GLB,, | Performed by: NURSE PRACTITIONER

## 2023-06-13 PROCEDURE — 99999 PR PBB SHADOW E&M-EST. PATIENT-LVL IV: CPT | Mod: PBBFAC,,, | Performed by: NURSE PRACTITIONER

## 2023-06-13 PROCEDURE — 3074F PR MOST RECENT SYSTOLIC BLOOD PRESSURE < 130 MM HG: ICD-10-PCS | Mod: CPTII,S$GLB,, | Performed by: NURSE PRACTITIONER

## 2023-06-13 PROCEDURE — 1159F MED LIST DOCD IN RCRD: CPT | Mod: CPTII,S$GLB,, | Performed by: NURSE PRACTITIONER

## 2023-06-13 PROCEDURE — 3074F SYST BP LT 130 MM HG: CPT | Mod: CPTII,S$GLB,, | Performed by: NURSE PRACTITIONER

## 2023-06-13 PROCEDURE — 99999 PR PBB SHADOW E&M-EST. PATIENT-LVL IV: ICD-10-PCS | Mod: PBBFAC,,, | Performed by: NURSE PRACTITIONER

## 2023-06-13 RX ORDER — BENZONATATE 100 MG/1
100 CAPSULE ORAL 3 TIMES DAILY PRN
Qty: 30 CAPSULE | Refills: 0 | Status: SHIPPED | OUTPATIENT
Start: 2023-06-13 | End: 2023-07-13

## 2023-06-13 RX ORDER — FLUTICASONE PROPIONATE 50 MCG
1 SPRAY, SUSPENSION (ML) NASAL 2 TIMES DAILY
Qty: 18.2 ML | Refills: 0 | Status: SHIPPED | OUTPATIENT
Start: 2023-06-13

## 2023-06-13 NOTE — PROGRESS NOTES
Subjective     Patient ID: Dai Roper is a 57 y.o. female.    Chief Complaint: Sore Throat    Pt new to me, here for sore throat and cold symptoms    Cough  This is a new problem. The current episode started in the past 7 days (2 days ago). The problem has been unchanged. The problem occurs every few hours. The cough is Non-productive. Associated symptoms include chest pain, chills, nasal congestion, rhinorrhea and a sore throat. Pertinent negatives include no ear congestion, ear pain, fever, headaches, heartburn, hemoptysis, myalgias, postnasal drip, rash, shortness of breath, sweats, weight loss or wheezing. The symptoms are aggravated by lying down. Treatments tried: Extra strength tylenol. Her past medical history is significant for asthma and bronchitis. There is no history of bronchiectasis, COPD, emphysema, environmental allergies or pneumonia.   Review of Systems   Constitutional:  Positive for chills. Negative for fever and weight loss.   HENT:  Positive for rhinorrhea and sore throat. Negative for ear pain and postnasal drip.    Respiratory:  Positive for cough. Negative for hemoptysis, shortness of breath and wheezing.    Cardiovascular:  Positive for chest pain.   Gastrointestinal:  Negative for abdominal pain, constipation, diarrhea, heartburn, nausea and vomiting.   Genitourinary:  Negative for dysuria.   Musculoskeletal:  Negative for arthralgias and myalgias.   Integumentary:  Negative for rash.   Allergic/Immunologic: Negative for environmental allergies, food allergies and immunocompromised state.   Neurological:  Negative for weakness and headaches.   Hematological:  Negative for adenopathy. Does not bruise/bleed easily.   Psychiatric/Behavioral:  Negative for suicidal ideas.      Review of patient's allergies indicates:   Allergen Reactions    Vitamin d analogue Edema     NOT allergy, but reaction to high dose vitamin D       Current Outpatient Medications:     cholecalciferol, vitamin  D3, (VITAMIN D3) 5,000 unit Tab, Take 5,000 Units by mouth once daily., Disp: 90 tablet, Rfl: 3    diclofenac sodium (VOLTAREN) 1 % Gel, Apply 2 g topically 2 (two) times daily., Disp: 1 Tube, Rfl: 0    DULoxetine (CYMBALTA) 30 MG capsule, Take 1 capsule (30 mg total) by mouth once daily., Disp: 90 capsule, Rfl: 3    gabapentin (NEURONTIN) 300 MG capsule, Take 1 capsule (300 mg total) by mouth 3 (three) times daily., Disp: 90 capsule, Rfl: 3    ibuprofen (ADVIL,MOTRIN) 800 MG tablet, Take 1 tablet (800 mg total) by mouth every 6 (six) hours as needed for Pain., Disp: 20 tablet, Rfl: 0    ketoconazole (NIZORAL) 2 % cream, Apply topically once daily., Disp: 60 g, Rfl: 1    meloxicam (MOBIC) 15 MG tablet, Take 1 tablet (15 mg total) by mouth once daily., Disp: 7 tablet, Rfl: 0    metFORMIN (GLUCOPHAGE-XR) 500 MG ER 24hr tablet, Take 1 tablet (500 mg total) by mouth 2 (two) times daily with meals., Disp: 180 tablet, Rfl: 3    hydroCHLOROthiazide (MICROZIDE) 12.5 mg capsule, Take 1 capsule (12.5 mg total) by mouth once daily., Disp: 30 capsule, Rfl: 11    Patient Active Problem List   Diagnosis    Morbid obesity due to excess calories    Chronic obstructive asthma    Type 2 diabetes mellitus with complication, without long-term current use of insulin    Hyperlipidemia associated with type 2 diabetes mellitus    Hypertension associated with diabetes    Diabetic polyneuropathy associated with type 2 diabetes mellitus    Pain and swelling of left lower leg    Uncontrolled secondary diabetes mellitus with stage 2 CKD (GFR 60-89)    Vitamin D deficiency    Colon cancer screening    Dental caries    Insomnia    Moderate episode of recurrent major depressive disorder    LANIE (generalized anxiety disorder)    Panic attacks    History of seizures-last seizure>10yrs ago    History of brain surgery in 1990 post physical trauma    History of abnormal mammogram    Pain of left breast    Rash and nonspecific skin eruption    De  Quervain's tenosynovitis     Past Medical History:   Diagnosis Date    Anxiety     Asthma     Depression     Diabetes mellitus     DVT (deep venous thrombosis)     Hx of psychiatric care     Hypertension     Psychiatric problem     Sleep difficulties      Past Surgical History:   Procedure Laterality Date    BREAST BIOPSY Left 12/18/2018    CARPAL TUNNEL RELEASE      HERNIA REPAIR      TUBAL LIGATION       Social History     Socioeconomic History    Marital status: Single   Occupational History    Occupation: disabled   Tobacco Use    Smoking status: Never    Smokeless tobacco: Never   Substance and Sexual Activity    Alcohol use: No    Drug use: No    Sexual activity: Not Currently     Family History   Problem Relation Age of Onset    Diabetes Mother     Heart disease Mother     Hypertension Mother     Lupus Mother     Depression Mother     Anxiety disorder Mother     Heart disease Father     Hypertension Father     Hypertension Sister     Lupus Sister     No Known Problems Brother     Anxiety disorder Daughter     Depression Daughter     Depression Son     Anxiety disorder Son     Hypertension Sister     Hypertension Sister     Breast cancer Maternal Aunt     Breast cancer Maternal Aunt     Breast cancer Maternal Cousin           Objective   Vitals:    06/13/23 1116   BP: 120/82   Pulse: 75   Temp: 98.4 °F (36.9 °C)   SpO2: 97%   Weight: 98.9 kg (218 lb)   Height: 5' (1.524 m)   PainSc:   8   PainLoc: Chest       Body mass index is 42.58 kg/m².    Physical Exam  Vitals and nursing note reviewed.   Constitutional:       Appearance: She is obese.   HENT:      Head: Normocephalic.      Right Ear: Tympanic membrane, ear canal and external ear normal.      Left Ear: Tympanic membrane, ear canal and external ear normal.      Nose: Congestion and rhinorrhea present.      Right Sinus: No maxillary sinus tenderness or frontal sinus tenderness.      Left Sinus: No maxillary sinus tenderness or frontal sinus tenderness.       Mouth/Throat:      Mouth: Mucous membranes are moist.      Pharynx: Oropharynx is clear. No oropharyngeal exudate or posterior oropharyngeal erythema.      Comments: Clear PND noted on exam      Eyes:      Conjunctiva/sclera: Conjunctivae normal.   Cardiovascular:      Rate and Rhythm: Normal rate and regular rhythm.      Pulses: Normal pulses.      Heart sounds: Normal heart sounds.   Pulmonary:      Effort: Pulmonary effort is normal.      Breath sounds: Normal breath sounds.   Musculoskeletal:         General: Normal range of motion.      Cervical back: Normal range of motion and neck supple.   Lymphadenopathy:      Cervical: No cervical adenopathy.   Skin:     General: Skin is warm and dry.   Neurological:      General: No focal deficit present.      Mental Status: She is alert and oriented to person, place, and time.   Psychiatric:         Mood and Affect: Mood normal.         Behavior: Behavior normal.         Thought Content: Thought content normal.         Judgment: Judgment normal.          Assessment and Plan     1. Acute non-recurrent pansinusitis  -     fluticasone propionate (FLONASE) 50 mcg/actuation nasal spray; 1 spray (50 mcg total) by Each Nostril route 2 (two) times daily.  Dispense: 18.2 mL; Refill: 0    2. Postnasal drip  -     fluticasone propionate (FLONASE) 50 mcg/actuation nasal spray; 1 spray (50 mcg total) by Each Nostril route 2 (two) times daily.  Dispense: 18.2 mL; Refill: 0    3. Cough in adult  -     benzonatate (TESSALON) 100 MG capsule; Take 1 capsule (100 mg total) by mouth 3 (three) times daily as needed for Cough.  Dispense: 30 capsule; Refill: 0    4. BMI 40.0-44.9, adult    5. Morbidly obese    Meds as prescribed    Rest and Fluids, Tylenol or Motrin otc as needed for pain and or fever    Warm liquids to thin mucus    Allergen avoidance discussed, humidified air recommended    Warm salt water gargles as needed for throat pain    Nasal spray, taught how to correctly use    Self  care instructions provided in AVS             Follow up if symptoms worsen or fail to improve.

## 2023-06-13 NOTE — PROGRESS NOTES
"Thank you for the referral. The following patient has been assigned to Tiffanie Pineda, RN with [facility] for high risk screening.    Reason for referral:   Type 2 diabetes mellitus with complication, without long-term current use of insulin  Moderate episode of recurrent major depressive disorder  Morbid obesity due to excess calories    Comment:  Frequent utilization of ED for "leg swelling", with negative outpatient work-up. Has not established care with psychology at [facility] or outside facility. Easily confused about meds, noncompliant, BMI >45.    Please contact [facility] at [phone] with any questions.    Thank you,  Ethel Sewell      " no concerns

## 2023-06-15 ENCOUNTER — TELEPHONE (OUTPATIENT)
Dept: FAMILY MEDICINE | Facility: CLINIC | Age: 58
End: 2023-06-15
Payer: MEDICARE

## 2023-06-15 NOTE — TELEPHONE ENCOUNTER
----- Message from Rosanna Abreu sent at 6/15/2023 10:15 AM CDT -----  Regarding: orders  Contact: [phone]  Pt is requesting orders for the following her annual mammogram .....Please call and adv @  [phone]    
Universal Safety Interventions

## 2023-06-27 ENCOUNTER — HOSPITAL ENCOUNTER (EMERGENCY)
Facility: HOSPITAL | Age: 58
Discharge: HOME | End: 2023-06-27
Attending: EMERGENCY MEDICINE
Payer: MEDICARE

## 2023-06-27 VITALS
OXYGEN SATURATION: 100 % | DIASTOLIC BLOOD PRESSURE: 78 MMHG | RESPIRATION RATE: 16 BRPM | TEMPERATURE: 98 F | SYSTOLIC BLOOD PRESSURE: 136 MMHG | BODY MASS INDEX: 41.82 KG/M2 | WEIGHT: 213 LBS | HEART RATE: 69 BPM | HEIGHT: 60 IN

## 2023-06-27 DIAGNOSIS — R19.7 NAUSEA, VOMITING AND DIARRHEA: Primary | ICD-10-CM

## 2023-06-27 DIAGNOSIS — R11.2 NAUSEA, VOMITING AND DIARRHEA: Primary | ICD-10-CM

## 2023-06-27 LAB
ALBUMIN SERPL BCP-MCNC: 4.2 G/DL (ref 3.5–5.2)
ALP SERPL-CCNC: 70 U/L (ref 55–135)
ALT SERPL W/O P-5'-P-CCNC: 11 U/L (ref 10–44)
ANION GAP SERPL CALC-SCNC: 10 MMOL/L (ref 8–16)
AST SERPL-CCNC: 24 U/L (ref 10–40)
BACTERIA #/AREA URNS HPF: NORMAL /HPF
BASOPHILS # BLD AUTO: 0 K/UL (ref 0–0.2)
BASOPHILS NFR BLD: 0 % (ref 0–1.9)
BILIRUB SERPL-MCNC: 0.6 MG/DL (ref 0.1–1)
BILIRUB UR QL STRIP: NEGATIVE
BUN SERPL-MCNC: 11 MG/DL (ref 6–20)
CALCIUM SERPL-MCNC: 9.4 MG/DL (ref 8.7–10.5)
CHLORIDE SERPL-SCNC: 106 MMOL/L (ref 95–110)
CLARITY UR: CLEAR
CO2 SERPL-SCNC: 22 MMOL/L (ref 23–29)
COLOR UR: YELLOW
CREAT SERPL-MCNC: 0.8 MG/DL (ref 0.5–1.4)
DIFFERENTIAL METHOD: ABNORMAL
EOSINOPHIL # BLD AUTO: 0.1 K/UL (ref 0–0.5)
EOSINOPHIL NFR BLD: 1.1 % (ref 0–8)
ERYTHROCYTE [DISTWIDTH] IN BLOOD BY AUTOMATED COUNT: 13.9 % (ref 11.5–14.5)
EST. GFR  (NO RACE VARIABLE): >60 ML/MIN/1.73 M^2
GLUCOSE SERPL-MCNC: 97 MG/DL (ref 70–110)
GLUCOSE UR QL STRIP: NEGATIVE
HCT VFR BLD AUTO: 39.5 % (ref 37–48.5)
HGB BLD-MCNC: 12.8 G/DL (ref 12–16)
HGB UR QL STRIP: NEGATIVE
IMM GRANULOCYTES # BLD AUTO: 0.02 K/UL (ref 0–0.04)
IMM GRANULOCYTES NFR BLD AUTO: 0.4 % (ref 0–0.5)
KETONES UR QL STRIP: NEGATIVE
LEUKOCYTE ESTERASE UR QL STRIP: ABNORMAL
LIPASE SERPL-CCNC: 11 U/L (ref 4–60)
LYMPHOCYTES # BLD AUTO: 1.1 K/UL (ref 1–4.8)
LYMPHOCYTES NFR BLD: 19.9 % (ref 18–48)
MCH RBC QN AUTO: 29.9 PG (ref 27–31)
MCHC RBC AUTO-ENTMCNC: 32.4 G/DL (ref 32–36)
MCV RBC AUTO: 92 FL (ref 82–98)
MICROSCOPIC COMMENT: NORMAL
MONOCYTES # BLD AUTO: 0.2 K/UL (ref 0.3–1)
MONOCYTES NFR BLD: 3.4 % (ref 4–15)
NEUTROPHILS # BLD AUTO: 4 K/UL (ref 1.8–7.7)
NEUTROPHILS NFR BLD: 75.2 % (ref 38–73)
NITRITE UR QL STRIP: NEGATIVE
NRBC BLD-RTO: 0 /100 WBC
PH UR STRIP: 8 [PH] (ref 5–8)
PLATELET # BLD AUTO: ABNORMAL K/UL (ref 150–450)
PLATELET BLD QL SMEAR: ABNORMAL
PMV BLD AUTO: 13 FL (ref 9.2–12.9)
POTASSIUM SERPL-SCNC: 4.7 MMOL/L (ref 3.5–5.1)
PROT SERPL-MCNC: 7.9 G/DL (ref 6–8.4)
PROT UR QL STRIP: ABNORMAL
RBC # BLD AUTO: 4.28 M/UL (ref 4–5.4)
RBC #/AREA URNS HPF: 0 /HPF (ref 0–4)
SODIUM SERPL-SCNC: 138 MMOL/L (ref 136–145)
SP GR UR STRIP: 1.02 (ref 1–1.03)
URN SPEC COLLECT METH UR: ABNORMAL
UROBILINOGEN UR STRIP-ACNC: NEGATIVE EU/DL
WBC # BLD AUTO: 5.34 K/UL (ref 3.9–12.7)
WBC #/AREA URNS HPF: 0 /HPF (ref 0–5)

## 2023-06-27 PROCEDURE — 80053 COMPREHEN METABOLIC PANEL: CPT | Performed by: EMERGENCY MEDICINE

## 2023-06-27 PROCEDURE — 96361 HYDRATE IV INFUSION ADD-ON: CPT

## 2023-06-27 PROCEDURE — 83690 ASSAY OF LIPASE: CPT | Performed by: EMERGENCY MEDICINE

## 2023-06-27 PROCEDURE — 99284 EMERGENCY DEPT VISIT MOD MDM: CPT | Mod: 25

## 2023-06-27 PROCEDURE — 96375 TX/PRO/DX INJ NEW DRUG ADDON: CPT

## 2023-06-27 PROCEDURE — 96374 THER/PROPH/DIAG INJ IV PUSH: CPT

## 2023-06-27 PROCEDURE — 96372 THER/PROPH/DIAG INJ SC/IM: CPT | Performed by: EMERGENCY MEDICINE

## 2023-06-27 PROCEDURE — 81000 URINALYSIS NONAUTO W/SCOPE: CPT | Performed by: EMERGENCY MEDICINE

## 2023-06-27 PROCEDURE — 85025 COMPLETE CBC W/AUTO DIFF WBC: CPT | Performed by: EMERGENCY MEDICINE

## 2023-06-27 PROCEDURE — 63600175 PHARM REV CODE 636 W HCPCS: Performed by: EMERGENCY MEDICINE

## 2023-06-27 RX ORDER — DICYCLOMINE HYDROCHLORIDE 10 MG/ML
20 INJECTION INTRAMUSCULAR
Status: COMPLETED | OUTPATIENT
Start: 2023-06-27 | End: 2023-06-27

## 2023-06-27 RX ORDER — ONDANSETRON 2 MG/ML
4 INJECTION INTRAMUSCULAR; INTRAVENOUS
Status: COMPLETED | OUTPATIENT
Start: 2023-06-27 | End: 2023-06-27

## 2023-06-27 RX ORDER — ONDANSETRON 4 MG/1
4 TABLET, ORALLY DISINTEGRATING ORAL EVERY 6 HOURS PRN
Qty: 15 TABLET | Refills: 0 | Status: SHIPPED | OUTPATIENT
Start: 2023-06-27

## 2023-06-27 RX ORDER — CHLORPHENIR/PHENYLEPH/ASPIRIN 2-7.8-325
1 TABLET, EFFERVESCENT ORAL DAILY
COMMUNITY

## 2023-06-27 RX ORDER — KETOROLAC TROMETHAMINE 30 MG/ML
15 INJECTION, SOLUTION INTRAMUSCULAR; INTRAVENOUS
Status: COMPLETED | OUTPATIENT
Start: 2023-06-27 | End: 2023-06-27

## 2023-06-27 RX ORDER — DICYCLOMINE HYDROCHLORIDE 20 MG/1
20 TABLET ORAL 3 TIMES DAILY PRN
Qty: 15 TABLET | Refills: 0 | Status: SHIPPED | OUTPATIENT
Start: 2023-06-27

## 2023-06-27 RX ORDER — ACETAMINOPHEN 500 MG
500 TABLET ORAL EVERY 6 HOURS PRN
COMMUNITY

## 2023-06-27 RX ADMIN — KETOROLAC TROMETHAMINE 15 MG: 30 INJECTION, SOLUTION INTRAMUSCULAR; INTRAVENOUS at 09:06

## 2023-06-27 RX ADMIN — SODIUM CHLORIDE, POTASSIUM CHLORIDE, SODIUM LACTATE AND CALCIUM CHLORIDE 1000 ML: 600; 310; 30; 20 INJECTION, SOLUTION INTRAVENOUS at 08:06

## 2023-06-27 RX ADMIN — DICYCLOMINE HYDROCHLORIDE 20 MG: 20 INJECTION, SOLUTION INTRAMUSCULAR at 08:06

## 2023-06-27 RX ADMIN — ONDANSETRON 4 MG: 2 INJECTION INTRAMUSCULAR; INTRAVENOUS at 08:06

## 2023-06-27 NOTE — PHARMACY MED REC
"  Admission Medication History     The home medication history was taken by Alejandra Magana CPhT.    Medication history obtained from, Patient Verified    You may go to "Admission" then "Reconcile Home Medications" tabs to review and/or act upon these items.     The home medication list has been updated by the Pharmacy department.   Please read ALL comments highlighted in yellow.   Please address this information as you see fit.    Feel free to contact us if you have any questions or require assistance.      The medications listed below were removed from the home medication list.  Please reorder if appropriate:  Patient reports no longer taking the following medication(s):  Vitamin D3 5,000 unit  Diclofenac 1% gel  Duloxetine 30 mg  HCTZ 12.5 mg  Ketoconazole 2% cream  Mobic 15 mg        Alejandra Magana CPhT.  Ext [phone]             .        "

## 2023-06-27 NOTE — ED PROVIDER NOTES
Encounter Date: 6/27/2023       History     Chief Complaint   Patient presents with    Emesis     4 episodes and vomiting and diarrhea that started last night. Congestion and productive cough with white mucus. Able to keep water down.     Abdominal Pain     Cramping that started around 1900.      Patient is a 57-year-old female who complains of diffuse abdominal pain with vomiting and diarrhea.  Symptoms started yesterday.  She is been unable to tolerate any p.o. intake.  She denies fever.  No urinary complaints.  No sick contacts.    Review of patient's allergies indicates:   Allergen Reactions    Vitamin d analogue Edema     NOT allergy, but reaction to high dose vitamin D     Past Medical History:   Diagnosis Date    Anxiety     Asthma     Depression     Diabetes mellitus     DVT (deep venous thrombosis)     Hx of psychiatric care     Hypertension     Psychiatric problem     Sleep difficulties      Past Surgical History:   Procedure Laterality Date    BREAST BIOPSY Left 12/18/2018    CARPAL TUNNEL RELEASE      HERNIA REPAIR      TUBAL LIGATION       Family History   Problem Relation Age of Onset    Diabetes Mother     Heart disease Mother     Hypertension Mother     Lupus Mother     Depression Mother     Anxiety disorder Mother     Heart disease Father     Hypertension Father     Hypertension Sister     Lupus Sister     No Known Problems Brother     Anxiety disorder Daughter     Depression Daughter     Depression Son     Anxiety disorder Son     Hypertension Sister     Hypertension Sister     Breast cancer Maternal Aunt     Breast cancer Maternal Aunt     Breast cancer Maternal Cousin      Social History     Tobacco Use    Smoking status: Never    Smokeless tobacco: Never   Substance Use Topics    Alcohol use: No    Drug use: No     Review of Systems   Constitutional:  Negative for chills and fever.   Cardiovascular:  Negative for chest pain.   Gastrointestinal:  Positive for abdominal pain, diarrhea, nausea and  vomiting.     Physical Exam     Initial Vitals [06/27/23 0736]   BP Pulse Resp Temp SpO2   126/78 87 16 98.4 °F (36.9 °C) 100 %      MAP       --         Physical Exam    Nursing note and vitals reviewed.  Constitutional: No distress.   HENT:   Dry mucous membranes.   Eyes: EOM are normal.   Neck: Neck supple.   Cardiovascular:  Normal rate, regular rhythm and normal heart sounds.           Pulmonary/Chest: Breath sounds normal.   Abdominal: Abdomen is soft.   Mild diffuse tenderness without guarding or rebound.   Musculoskeletal:         General: Normal range of motion.      Cervical back: Neck supple.     Neurological: She is alert and oriented to person, place, and time.   Skin: Skin is warm and dry.       ED Course   Procedures  Labs Reviewed   URINALYSIS - Abnormal; Notable for the following components:       Result Value    Protein, UA Trace (*)     Leukocytes, UA Trace (*)     All other components within normal limits   CBC W/ AUTO DIFFERENTIAL - Abnormal; Notable for the following components:    MPV 13.0 (*)     Mono # 0.2 (*)     Gran % 75.2 (*)     Mono % 3.4 (*)     All other components within normal limits   COMPREHENSIVE METABOLIC PANEL - Abnormal; Notable for the following components:    CO2 22 (*)     All other components within normal limits   LIPASE   URINALYSIS MICROSCOPIC          Imaging Results    None          Medications   lactated ringers bolus 1,000 mL (0 mLs Intravenous Stopped 6/27/23 0942)   ondansetron injection 4 mg (4 mg Intravenous Given 6/27/23 0842)   dicyclomine injection 20 mg (20 mg Intramuscular Given 6/27/23 0821)   ketorolac injection 15 mg (15 mg Intravenous Given 6/27/23 0931)     Medical Decision Making:   Initial Assessment:   57-year-old female with nausea, vomiting and diarrhea.  She also has diffuse abdominal pain.  No fever.  IV fluids and antiemetics be ordered as well as appropriate lab work.  Differential Diagnosis:   Pancreatitis, gastroenteritis, cholelithiasis,  cholecystitis, small-bowel obstruction.  Clinical Tests:   Lab Tests: Ordered and Reviewed  The following lab test(s) were unremarkable: CBC, CMP, Urinalysis and Lipase  ED Management:  Lab work is unremarkable.  After IV hydration Bentyl, Zofran and Toradol, patient reported feeling much better.  Most likely etiology seems to be a viral gastroenteritis.  I will discharge her with prescriptions for Bentyl and Zofran.  She should follow up with the primary physician as soon as able but may also return to the ED for any possible worsening.                        Clinical Impression:   Final diagnoses:  [R11.2, R19.7] Nausea, vomiting and diarrhea (Primary)        ED Disposition Condition    Discharge Stable          ED Prescriptions       Medication Sig Dispense Start Date End Date Auth. Provider    ondansetron (ZOFRAN-ODT) 4 MG TbDL Take 1 tablet (4 mg total) by mouth every 6 (six) hours as needed (Nausea or vomiting). 15 tablet 6/27/2023 -- Bhavik Mcgill MD    dicyclomine (BENTYL) 20 mg tablet Take 1 tablet (20 mg total) by mouth 3 (three) times daily as needed (Abdominal pain). 15 tablet 6/27/2023 -- Bhavik Mcgill MD          Follow-up Information       Follow up With Specialties Details Why Contact Info    Your primary doctor   As needed     [facility] - Emergency Dept Emergency Medicine  If symptoms worsen [address] [facility] [address] [phone]             Bhavik Mcgill MD  06/27/23 6746

## 2023-07-03 ENCOUNTER — TELEPHONE (OUTPATIENT)
Dept: INTERNAL MEDICINE | Facility: CLINIC | Age: 58
End: 2023-07-03
Payer: MEDICARE

## 2023-07-03 NOTE — TELEPHONE ENCOUNTER
Tried to call pt to let her know please bring the [employer] paper work when she come see Ms. Henderson. We could not find it in her chart. Her mail box is full.

## 2023-07-03 NOTE — TELEPHONE ENCOUNTER
----- Message from Ivory Johnson sent at 7/3/2023 10:56 AM CDT -----  Good morning,    Pt has a [employer] form scanned into  for provider to fill out.    Thank you,  Ivory LANDIS  [facility]

## 2023-07-24 ENCOUNTER — TELEPHONE (OUTPATIENT)
Dept: FAMILY MEDICINE | Facility: CLINIC | Age: 58
End: 2023-07-24

## 2023-07-24 NOTE — TELEPHONE ENCOUNTER
----- Message from Lu Rojas sent at 7/24/2023  8:29 AM CDT -----  Contact: [phone]  ///Type:  Mammogram    Caller is requesting to schedule their annual mammogram appointment.  Order is not listed in EPIC.  Please enter order and contact patient to schedule.  Name of Caller: Dai Roper  Where would they like the mammogram performed? Main campus  Would the patient rather a call back or a response via [facility]? Call back   Best Call Back Number: [phone]   Additional Information:        ///Patient would like to get a referral.  Referral to what specialty:  Optometry   Does the patient want the referral with a specific physician:  no   Is the specialist an [facility]:  no   Reason (be specific):  Routine eye exam   Does the patient already have the specialty clinic appointment scheduled:  no   If yes, what date is the appointment scheduled:   n/a  Is the insurance listed in Epic correct? (this is important for a referral):  yes  Advised patient that once provider approves this either a nurse or  will return their call?: yes  Would the patient like a call back, or a response through their [facility] portal?:    call back   Comments:

## 2023-10-12 NOTE — ASSESSMENT & PLAN NOTE
Taking oral anti-glycemics, poor control  · Check A1c today  · Advised to cut back on high-glycemic foods  · Health  consult   yes